# Patient Record
Sex: FEMALE | Race: WHITE | NOT HISPANIC OR LATINO | Employment: PART TIME | URBAN - METROPOLITAN AREA
[De-identification: names, ages, dates, MRNs, and addresses within clinical notes are randomized per-mention and may not be internally consistent; named-entity substitution may affect disease eponyms.]

---

## 2017-09-21 ENCOUNTER — TRANSCRIBE ORDERS (OUTPATIENT)
Dept: ADMINISTRATIVE | Facility: HOSPITAL | Age: 47
End: 2017-09-21

## 2017-09-21 DIAGNOSIS — M54.5 ACUTE LOW BACK PAIN, UNSPECIFIED BACK PAIN LATERALITY, WITH SCIATICA PRESENCE UNSPECIFIED: Primary | ICD-10-CM

## 2017-10-05 ENCOUNTER — HOSPITAL ENCOUNTER (OUTPATIENT)
Dept: INFUSION THERAPY | Facility: HOSPITAL | Age: 47
Discharge: HOME | End: 2017-10-05
Attending: PSYCHIATRY & NEUROLOGY | Admitting: PSYCHIATRY & NEUROLOGY

## 2017-10-05 DIAGNOSIS — M54.5 ACUTE LOW BACK PAIN, UNSPECIFIED BACK PAIN LATERALITY, WITH SCIATICA PRESENCE UNSPECIFIED: ICD-10-CM

## 2017-10-05 PROCEDURE — 95908 NRV CNDJ TST 3-4 STUDIES: CPT | Performed by: PSYCHIATRY & NEUROLOGY

## 2017-10-05 PROCEDURE — 95886 MUSC TEST DONE W/N TEST COMP: CPT | Performed by: PSYCHIATRY & NEUROLOGY

## 2017-10-05 PROCEDURE — 95908 NRV CNDJ TST 3-4 STUDIES: CPT

## 2017-10-05 PROCEDURE — 95886 MUSC TEST DONE W/N TEST COMP: CPT

## 2017-10-05 NOTE — PROCEDURES
EMG and Nerve Conduction Studies    Please see data sheets for details on methods, temperatures and lab standards. EMG muscles tested for upper extremity studies include the deltoid, biceps, triceps, pronator teres, extensor digitorum communis, first dorsal interosseous and abductor pollicis brevis.  EMG muscles tested for lower extremity studies include the vastus lateralis, tibialis anterior, peroneus longus, medial gastrocnemius and extensor digitorum brevis.  Additional muscles tested as needed.  Paraspinal muscles tested as needed.  The complete report includes the data sheets.    Indication: Right lumbosacral radiculopathy  History: 47-year-old woman who earlier this summer was skimming the pool and developed low back pain with radiating pain down the right leg which wraps around from lateral to medial knee.  Symptoms have improved some over time.  She indicates an MRI of her lumbar spine has shown disc bulging with some root impingement.      Ht: Not recorded  Wt: Not recorded  HbA1C: No results found for: HGBA1C  TSH: No results found for: TSH    Technical summary:  Nerve conduction studies were obtained in the right leg.  Skin temperature was a little cool at about 31.5°C measured at the ankle.  Needle examination was obtained on selected muscles of the right leg.    Results:  1.  Normal right sural sensory study.  2.  Normal right superficial peroneal sensory study.  3.  Normal right peroneal motor study.  4.  Normal right tibial motor study.  5.  Needle examination of selected muscles of the right leg showed some increased insertional activity in the medial gastrocnemius however there was also incomplete relaxation.  No clear-cut fibrillations or positive sharp waves were seen however.  The voluntary motor units were normal size and configuration and the interference pattern was essentially full.  Remaining muscles tested showed normal insertional activities, motor units and recruitment  patterns    Impression:  Essentially normal study.  There were normal nerve conductions.  The needle examination showed some irritability of the medial gastrocnemius but definitive evidence of acute denervation was not seen.  Clinical correlation is suggested.  Study results were discussed with the patient.    Ayad Sainz M.D.              Dictated utilizing Dragon dictation.

## 2019-07-09 ENCOUNTER — OFFICE VISIT (OUTPATIENT)
Dept: FAMILY MEDICINE CLINIC | Facility: CLINIC | Age: 49
End: 2019-07-09

## 2019-07-09 VITALS
HEART RATE: 102 BPM | RESPIRATION RATE: 22 BRPM | HEIGHT: 66 IN | BODY MASS INDEX: 44.1 KG/M2 | DIASTOLIC BLOOD PRESSURE: 104 MMHG | TEMPERATURE: 98 F | OXYGEN SATURATION: 94 % | SYSTOLIC BLOOD PRESSURE: 140 MMHG | WEIGHT: 274.4 LBS

## 2019-07-09 DIAGNOSIS — B35.1 ONYCHOMYCOSIS: ICD-10-CM

## 2019-07-09 DIAGNOSIS — S82.191D OTHER CLOSED FRACTURE OF PROXIMAL END OF RIGHT TIBIA WITH ROUTINE HEALING, SUBSEQUENT ENCOUNTER: ICD-10-CM

## 2019-07-09 DIAGNOSIS — K58.2 IRRITABLE BOWEL SYNDROME WITH BOTH CONSTIPATION AND DIARRHEA: ICD-10-CM

## 2019-07-09 DIAGNOSIS — R42 VERTIGO: ICD-10-CM

## 2019-07-09 DIAGNOSIS — M79.7 FIBROMYALGIA: ICD-10-CM

## 2019-07-09 DIAGNOSIS — Z12.39 SCREENING FOR BREAST CANCER: ICD-10-CM

## 2019-07-09 DIAGNOSIS — I10 ESSENTIAL HYPERTENSION: Primary | ICD-10-CM

## 2019-07-09 DIAGNOSIS — E78.01 FAMILIAL HYPERCHOLESTEROLEMIA: ICD-10-CM

## 2019-07-09 DIAGNOSIS — D33.3 VESTIBULAR SCHWANNOMA (HCC): ICD-10-CM

## 2019-07-09 PROBLEM — M32.9 SYSTEMIC LUPUS ERYTHEMATOSUS ARTHRITIS: Status: ACTIVE | Noted: 2019-07-09

## 2019-07-09 PROBLEM — J01.90 SINUSITIS, ACUTE: Status: ACTIVE | Noted: 2019-07-09

## 2019-07-09 PROBLEM — S82.201A RIGHT TIBIAL FRACTURE: Status: ACTIVE | Noted: 2019-07-09

## 2019-07-09 PROBLEM — F41.9 ANXIETY: Status: ACTIVE | Noted: 2019-07-09

## 2019-07-09 PROBLEM — H90.5 SENSORINEURAL HEARING LOSS: Status: ACTIVE | Noted: 2018-02-19

## 2019-07-09 PROBLEM — E78.5 HYPERLIPIDEMIA: Status: ACTIVE | Noted: 2019-07-09

## 2019-07-09 PROBLEM — F32.A DEPRESSION: Status: ACTIVE | Noted: 2019-07-09

## 2019-07-09 PROBLEM — M13.80 SERONEGATIVE ARTHRITIS: Status: ACTIVE | Noted: 2019-07-09

## 2019-07-09 PROBLEM — G47.00 INSOMNIA: Status: ACTIVE | Noted: 2018-08-14

## 2019-07-09 PROBLEM — R53.83 FATIGUE: Status: ACTIVE | Noted: 2019-07-09

## 2019-07-09 PROBLEM — K83.4 SPHINCTER OF ODDI SPASM: Status: ACTIVE | Noted: 2019-07-09

## 2019-07-09 PROBLEM — J45.909 ASTHMA: Status: ACTIVE | Noted: 2019-07-09

## 2019-07-09 PROBLEM — F41.1 GENERALIZED ANXIETY DISORDER: Status: ACTIVE | Noted: 2018-06-26

## 2019-07-09 PROBLEM — K51.90 ULCERATIVE COLITIS: Status: ACTIVE | Noted: 2019-07-09

## 2019-07-09 PROBLEM — M54.50 LOW BACK PAIN: Status: ACTIVE | Noted: 2019-07-09

## 2019-07-09 PROBLEM — R26.89 BALANCE PROBLEMS: Status: ACTIVE | Noted: 2018-02-19

## 2019-07-09 PROBLEM — J30.9 ALLERGIC RHINITIS: Status: ACTIVE | Noted: 2019-07-09

## 2019-07-09 PROBLEM — K58.9 IRRITABLE BOWEL SYNDROME: Status: ACTIVE | Noted: 2019-07-09

## 2019-07-09 PROBLEM — K21.9 GERD (GASTROESOPHAGEAL REFLUX DISEASE): Status: ACTIVE | Noted: 2019-07-09

## 2019-07-09 PROBLEM — G47.33 OBSTRUCTIVE SLEEP APNEA: Status: ACTIVE | Noted: 2019-07-09

## 2019-07-09 PROCEDURE — 99214 OFFICE O/P EST MOD 30 MIN: CPT | Performed by: FAMILY MEDICINE

## 2019-07-09 RX ORDER — HYDROXYZINE PAMOATE 25 MG/1
1 CAPSULE ORAL 3 TIMES DAILY PRN
Refills: 1 | COMMUNITY
Start: 2019-06-03 | End: 2020-01-08 | Stop reason: SDUPTHER

## 2019-07-09 RX ORDER — ATORVASTATIN CALCIUM 20 MG/1
1 TABLET, FILM COATED ORAL NIGHTLY
Refills: 5 | COMMUNITY
Start: 2019-06-25 | End: 2019-07-09 | Stop reason: SDUPTHER

## 2019-07-09 RX ORDER — HYDROCHLOROTHIAZIDE 25 MG/1
25 TABLET ORAL DAILY
Qty: 30 TABLET | Refills: 5 | Status: SHIPPED | OUTPATIENT
Start: 2019-07-09 | End: 2019-08-06 | Stop reason: ALTCHOICE

## 2019-07-09 RX ORDER — ALBUTEROL SULFATE 90 UG/1
2 AEROSOL, METERED RESPIRATORY (INHALATION) EVERY 4 HOURS PRN
COMMUNITY
End: 2020-03-20

## 2019-07-09 RX ORDER — MULTIVIT-MIN/FOLIC/VIT K/LYCOP 400-20-370
1 TABLET ORAL 2 TIMES DAILY PRN
Refills: 1 | COMMUNITY
Start: 2019-04-17 | End: 2019-07-09 | Stop reason: SDUPTHER

## 2019-07-09 RX ORDER — LOSARTAN POTASSIUM AND HYDROCHLOROTHIAZIDE 25; 100 MG/1; MG/1
1 TABLET ORAL DAILY
COMMUNITY
End: 2019-07-09 | Stop reason: ALTCHOICE

## 2019-07-09 RX ORDER — OMEPRAZOLE 40 MG/1
1 CAPSULE, DELAYED RELEASE ORAL DAILY
Refills: 5 | COMMUNITY
Start: 2019-06-25 | End: 2019-07-09 | Stop reason: SDUPTHER

## 2019-07-09 RX ORDER — LORATADINE 10 MG/1
1 TABLET ORAL DAILY
COMMUNITY
End: 2020-12-01

## 2019-07-09 RX ORDER — AMLODIPINE BESYLATE 2.5 MG/1
2.5 TABLET ORAL DAILY
Qty: 30 TABLET | Refills: 11 | Status: SHIPPED | OUTPATIENT
Start: 2019-07-09 | End: 2020-01-08 | Stop reason: SDUPTHER

## 2019-07-09 RX ORDER — GABAPENTIN 800 MG/1
3 TABLET ORAL DAILY
Refills: 5 | COMMUNITY
Start: 2019-06-25 | End: 2019-07-09 | Stop reason: SDUPTHER

## 2019-07-09 RX ORDER — ATORVASTATIN CALCIUM 20 MG/1
20 TABLET, FILM COATED ORAL NIGHTLY
Qty: 30 TABLET | Refills: 5 | Status: SHIPPED | OUTPATIENT
Start: 2019-07-09 | End: 2020-01-08 | Stop reason: SDUPTHER

## 2019-07-09 RX ORDER — OMEPRAZOLE 40 MG/1
40 CAPSULE, DELAYED RELEASE ORAL DAILY
Qty: 30 CAPSULE | Refills: 5 | Status: SHIPPED | OUTPATIENT
Start: 2019-07-09 | End: 2019-10-14 | Stop reason: ALTCHOICE

## 2019-07-09 RX ORDER — AMLODIPINE BESYLATE 2.5 MG/1
1 TABLET ORAL DAILY
Refills: 11 | COMMUNITY
Start: 2019-06-04 | End: 2019-07-09 | Stop reason: SDUPTHER

## 2019-07-09 RX ORDER — MULTIVIT-MIN/FOLIC/VIT K/LYCOP 400-20-370
250 TABLET ORAL 2 TIMES DAILY
Qty: 60 CAPSULE | Refills: 12 | Status: SHIPPED | OUTPATIENT
Start: 2019-07-09 | End: 2019-11-08

## 2019-07-09 RX ORDER — CYCLOBENZAPRINE HCL 10 MG
10 TABLET ORAL 3 TIMES DAILY PRN
COMMUNITY
End: 2021-04-13 | Stop reason: HOSPADM

## 2019-07-09 RX ORDER — GABAPENTIN 800 MG/1
800 TABLET ORAL 3 TIMES DAILY
Qty: 90 TABLET | Refills: 5 | Status: SHIPPED | OUTPATIENT
Start: 2019-07-09 | End: 2020-01-08 | Stop reason: SDUPTHER

## 2019-07-09 RX ORDER — MIRABEGRON 50 MG/1
1 TABLET, FILM COATED, EXTENDED RELEASE ORAL DAILY
Refills: 11 | COMMUNITY
Start: 2019-06-13 | End: 2021-11-30

## 2019-07-09 RX ORDER — DULOXETIN HYDROCHLORIDE 60 MG/1
1 CAPSULE, DELAYED RELEASE ORAL DAILY
Refills: 2 | COMMUNITY
Start: 2019-06-21 | End: 2019-11-08

## 2019-07-09 RX ORDER — FLUCONAZOLE 200 MG/1
200 TABLET ORAL WEEKLY
Qty: 12 TABLET | Refills: 1 | Status: SHIPPED | OUTPATIENT
Start: 2019-07-09 | End: 2020-12-01

## 2019-07-09 RX ORDER — FLUCONAZOLE 200 MG/1
1 TABLET ORAL WEEKLY
Refills: 1 | COMMUNITY
Start: 2019-04-05 | End: 2019-07-09 | Stop reason: SDUPTHER

## 2019-07-09 RX ORDER — TEMAZEPAM 7.5 MG/1
15 CAPSULE ORAL NIGHTLY PRN
Refills: 0 | COMMUNITY
Start: 2019-05-02 | End: 2019-07-22

## 2019-07-09 RX ORDER — CYCLOSPORINE 0.5 MG/ML
1 EMULSION OPHTHALMIC 2 TIMES DAILY
COMMUNITY
End: 2021-12-21 | Stop reason: SDUPTHER

## 2019-07-09 RX ORDER — FLUTICASONE PROPIONATE 50 MCG
2 SPRAY, SUSPENSION (ML) NASAL DAILY
COMMUNITY
End: 2021-04-13 | Stop reason: HOSPADM

## 2019-07-09 RX ORDER — MECLIZINE HCL 12.5 MG/1
1 TABLET ORAL 3 TIMES DAILY PRN
Refills: 3 | COMMUNITY
Start: 2019-06-13 | End: 2022-05-16 | Stop reason: ALTCHOICE

## 2019-07-09 NOTE — PROGRESS NOTES
Subjective   Amy L Sturgeon is a 49 y.o. female.   Chief Complaint   Patient presents with   • Hypertension         History of Present Illness     Patient present today to re-establish care. Previous patient at [facility].  Has concerns of her blood pressure being elevated. Losartan w/ HCTZ was stopped by [facility] after patient's BP remained WNL and patient experienced fatigue and dizziness.  Patient has history of acoustic neuroma which causes chronic vertigo.  She experiences this mostly when she is lying down or rolling over when sleeping. She is requesting multiple refills today. Last screening mammogram was on 4/4/18 and was BIRADS 1 at [facility] she would like to get her next mammogram scheduled at the same location. She states that she has means of checking her BP at home, but does not due to the age of the machine. She states that her BP at [facility] has been running 140's/90's.She has been taking Diflucan for the past 6 months for toenail fungus on bilateral great toes,  she states the right nail is still very thick left nail is looking much better.  sThe following portions of the patient's history were reviewed and updated as appropriate: allergies, current medications, past family history, past medical history, past social history, past surgical history and problem list.    Review of Systems   Constitutional: Negative for diaphoresis, fatigue and fever.   Respiratory: Negative for cough and shortness of breath.    Cardiovascular: Negative for chest pain, palpitations and leg swelling.   Gastrointestinal: Negative.  Negative for GERD and indigestion ( contolled on omeprozole).   Musculoskeletal: Positive for myalgias ( much better on Gabapentin and cymbalta).   Skin: Negative for rash.   Neurological: Positive for dizziness.   Psychiatric/Behavioral: Positive for depressed mood. The patient is nervous/anxious ( better on medications).        Objective   Physical Exam    Constitutional: She is oriented to person, place, and time. She appears well-developed and well-nourished. No distress.   Eyes: Conjunctivae and EOM are normal. Pupils are equal, round, and reactive to light.   Neck: Normal range of motion.   Cardiovascular: Normal rate and regular rhythm.   No murmur heard.  Pulmonary/Chest: Effort normal. She has no wheezes.   Musculoskeletal: Normal range of motion. She exhibits no edema.   Neurological: She is alert and oriented to person, place, and time.   Skin: Skin is warm and dry.   Right great toe nail was going about shelter is still very thick left nail appears mostly clear   Psychiatric: She has a normal mood and affect.   Nursing note and vitals reviewed.        Assessment/Plan   Marcela was seen today for hypertension.    Diagnoses and all orders for this visit:    Essential hypertension  -     Cancel: Comprehensive Metabolic Panel; Future  -     Cancel: TSH; Future  -     Comprehensive Metabolic Panel; Future  -     TSH; Future    Familial hypercholesterolemia  -     Cancel: Lipid Panel; Future  -     Lipid Panel; Future    Onychomycosis    Other closed fracture of proximal end of right tibia with routine healing, subsequent encounter    Fibromyalgia    Irritable bowel syndrome with both constipation and diarrhea    Screening for breast cancer  -     Mammo Screening Bilateral With CAD; Future    Vestibular schwannoma (CMS/HCC)    Vertigo    Other orders  -     amLODIPine (NORVASC) 2.5 MG tablet; Take 1 tablet by mouth Daily.  -     atorvastatin (LIPITOR) 20 MG tablet; Take 1 tablet by mouth Every Night.  -     [facility] DIGESTIVE PROBIOTIC 250 MG capsule; Take 1 capsule by mouth 2 (Two) Times a Day.  -     fluconazole (DIFLUCAN) 200 MG tablet; Take 1 tablet by mouth 1 (One) Time Per Week. X 3 months  -     gabapentin (NEURONTIN) 800 MG tablet; Take 1 tablet by mouth 3 (Three) Times a Day. 1 tab po qd and 2 tabs po q hs  -     omeprazole (priLOSEC) 40 MG capsule; Take 1  capsule by mouth Daily.  -     piroxicam (FELDENE) 20 MG capsule; Take 1 capsule by mouth Daily. with food  -     hydrochlorothiazide (HYDRODIURIL) 25 MG tablet; Take 1 tablet by mouth Daily.        Return in about 2 weeks (around 7/23/2019) for Annual physical /review labs, re check BP.

## 2019-07-09 NOTE — PATIENT INSTRUCTIONS
"DASH Eating Plan  DASH stands for \"Dietary Approaches to Stop Hypertension.\" The DASH eating plan is a healthy eating plan that has been shown to reduce high blood pressure (hypertension). It may also reduce your risk for type 2 diabetes, heart disease, and stroke. The DASH eating plan may also help with weight loss.  What are tips for following this plan?  General guidelines  · Avoid eating more than 2,300 mg (milligrams) of salt (sodium) a day. If you have hypertension, you may need to reduce your sodium intake to 1,500 mg a day.  · Limit alcohol intake to no more than 1 drink a day for nonpregnant women and 2 drinks a day for men. One drink equals 12 oz of beer, 5 oz of wine, or 1½ oz of hard liquor.  · Work with your health care provider to maintain a healthy body weight or to lose weight. Ask what an ideal weight is for you.  · Get at least 30 minutes of exercise that causes your heart to beat faster (aerobic exercise) most days of the week. Activities may include walking, swimming, or biking.  · Work with your health care provider or diet and nutrition specialist (dietitian) to adjust your eating plan to your individual calorie needs.  Reading food labels  · Check food labels for the amount of sodium per serving. Choose foods with less than 5 percent of the Daily Value of sodium. Generally, foods with less than 300 mg of sodium per serving fit into this eating plan.  · To find whole grains, look for the word \"whole\" as the first word in the ingredient list.  Shopping  · Buy products labeled as \"low-sodium\" or \"no salt added.\"  · Buy fresh foods. Avoid canned foods and premade or frozen meals.  Cooking  · Avoid adding salt when cooking. Use salt-free seasonings or herbs instead of table salt or sea salt. Check with your health care provider or pharmacist before using salt substitutes.  · Do not kilgore foods. Cook foods using healthy methods such as baking, boiling, grilling, and broiling instead.  · Cook with " heart-healthy oils, such as olive, canola, soybean, or sunflower oil.  Meal planning    · Eat a balanced diet that includes:  ? 5 or more servings of fruits and vegetables each day. At each meal, try to fill half of your plate with fruits and vegetables.  ? Up to 6-8 servings of whole grains each day.  ? Less than 6 oz of lean meat, poultry, or fish each day. A 3-oz serving of meat is about the same size as a deck of cards. One egg equals 1 oz.  ? 2 servings of low-fat dairy each day.  ? A serving of nuts, seeds, or beans 5 times each week.  ? Heart-healthy fats. Healthy fats called Omega-3 fatty acids are found in foods such as flaxseeds and coldwater fish, like sardines, salmon, and mackerel.  · Limit how much you eat of the following:  ? Canned or prepackaged foods.  ? Food that is high in trans fat, such as fried foods.  ? Food that is high in saturated fat, such as fatty meat.  ? Sweets, desserts, sugary drinks, and other foods with added sugar.  ? Full-fat dairy products.  · Do not salt foods before eating.  · Try to eat at least 2 vegetarian meals each week.  · Eat more home-cooked food and less restaurant, buffet, and fast food.  · When eating at a restaurant, ask that your food be prepared with less salt or no salt, if possible.  What foods are recommended?  The items listed may not be a complete list. Talk with your dietitian about what dietary choices are best for you.  Grains  Whole-grain or whole-wheat bread. Whole-grain or whole-wheat pasta. Brown rice. Oatmeal. Quinoa. Bulgur. Whole-grain and low-sodium cereals. Elise bread. Low-fat, low-sodium crackers. Whole-wheat flour tortillas.  Vegetables  Fresh or frozen vegetables (raw, steamed, roasted, or grilled). Low-sodium or reduced-sodium tomato and vegetable juice. Low-sodium or reduced-sodium tomato sauce and tomato paste. Low-sodium or reduced-sodium canned vegetables.  Fruits  All fresh, dried, or frozen fruit. Canned fruit in natural juice (without  added sugar).  Meat and other protein foods  Skinless chicken or turkey. Ground chicken or turkey. Pork with fat trimmed off. Fish and seafood. Egg whites. Dried beans, peas, or lentils. Unsalted nuts, nut butters, and seeds. Unsalted canned beans. Lean cuts of beef with fat trimmed off. Low-sodium, lean deli meat.  Dairy  Low-fat (1%) or fat-free (skim) milk. Fat-free, low-fat, or reduced-fat cheeses. Nonfat, low-sodium ricotta or cottage cheese. Low-fat or nonfat yogurt. Low-fat, low-sodium cheese.  Fats and oils  Soft margarine without trans fats. Vegetable oil. Low-fat, reduced-fat, or light mayonnaise and salad dressings (reduced-sodium). Canola, safflower, olive, soybean, and sunflower oils. Avocado.  Seasoning and other foods  Herbs. Spices. Seasoning mixes without salt. Unsalted popcorn and pretzels. Fat-free sweets.  What foods are not recommended?  The items listed may not be a complete list. Talk with your dietitian about what dietary choices are best for you.  Grains  Baked goods made with fat, such as croissants, muffins, or some breads. Dry pasta or rice meal packs.  Vegetables  Creamed or fried vegetables. Vegetables in a cheese sauce. Regular canned vegetables (not low-sodium or reduced-sodium). Regular canned tomato sauce and paste (not low-sodium or reduced-sodium). Regular tomato and vegetable juice (not low-sodium or reduced-sodium). Pickles. Olives.  Fruits  Canned fruit in a light or heavy syrup. Fried fruit. Fruit in cream or butter sauce.  Meat and other protein foods  Fatty cuts of meat. Ribs. Fried meat. Upton. Sausage. Bologna and other processed lunch meats. Salami. Fatback. Hotdogs. Bratwurst. Salted nuts and seeds. Canned beans with added salt. Canned or smoked fish. Whole eggs or egg yolks. Chicken or turkey with skin.  Dairy  Whole or 2% milk, cream, and half-and-half. Whole or full-fat cream cheese. Whole-fat or sweetened yogurt. Full-fat cheese. Nondairy creamers. Whipped toppings.  Processed cheese and cheese spreads.  Fats and oils  Butter. Stick margarine. Lard. Shortening. Ghee. Upton fat. Tropical oils, such as coconut, palm kernel, or palm oil.  Seasoning and other foods  Salted popcorn and pretzels. Onion salt, garlic salt, seasoned salt, table salt, and sea salt. Worcestershire sauce. Tartar sauce. Barbecue sauce. Teriyaki sauce. Soy sauce, including reduced-sodium. Steak sauce. Canned and packaged gravies. Fish sauce. Oyster sauce. Cocktail sauce. Horseradish that you find on the shelf. Ketchup. Mustard. Meat flavorings and tenderizers. Bouillon cubes. Hot sauce and Tabasco sauce. Premade or packaged marinades. Premade or packaged taco seasonings. Relishes. Regular salad dressings.  Where to find more information:  · National Heart, Lung, and Blood [place]: www.nhlbi.nih.gov  · American Heart Association: www.heart.org  Summary  · The DASH eating plan is a healthy eating plan that has been shown to reduce high blood pressure (hypertension). It may also reduce your risk for type 2 diabetes, heart disease, and stroke.  · With the DASH eating plan, you should limit salt (sodium) intake to 2,300 mg a day. If you have hypertension, you may need to reduce your sodium intake to 1,500 mg a day.  · When on the DASH eating plan, aim to eat more fresh fruits and vegetables, whole grains, lean proteins, low-fat dairy, and heart-healthy fats.  · Work with your health care provider or diet and nutrition specialist (dietitian) to adjust your eating plan to your individual calorie needs.  This information is not intended to replace advice given to you by your health care provider. Make sure you discuss any questions you have with your health care provider.  Document Released: 12/06/2012 Document Revised: 12/11/2017 Document Reviewed: 12/11/2017  [employer] Interactive Patient Education © 2019 [employer]    Benign Positional Vertigo  Vertigo is the feeling that you or your surroundings are moving  when they are not. Benign positional vertigo is the most common form of vertigo. The cause of this condition is not serious (is benign). This condition is triggered by certain movements and positions (is positional). This condition can be dangerous if it occurs while you are doing something that could endanger you or others, such as driving.  What are the causes?  In many cases, the cause of this condition is not known. It may be caused by a disturbance in an area of the inner ear that helps your brain to sense movement and balance. This disturbance can be caused by a viral infection (labyrinthitis), head injury, or repetitive motion.  What increases the risk?  This condition is more likely to develop in:  · Women.  · People who are 50 years of age or older.    What are the signs or symptoms?  Symptoms of this condition usually happen when you move your head or your eyes in different directions. Symptoms may start suddenly, and they usually last for less than a minute. Symptoms may include:  · Loss of balance and falling.  · Feeling like you are spinning or moving.  · Feeling like your surroundings are spinning or moving.  · Nausea and vomiting.  · Blurred vision.  · Dizziness.  · Involuntary eye movement (nystagmus).    Symptoms can be mild and cause only slight annoyance, or they can be severe and interfere with daily life. Episodes of benign positional vertigo may return (recur) over time, and they may be triggered by certain movements. Symptoms may improve over time.  How is this diagnosed?  This condition is usually diagnosed by medical history and a physical exam of the head, neck, and ears. You may be referred to a health care provider who specializes in ear, nose, and throat (ENT) problems (otolaryngologist) or a provider who specializes in disorders of the nervous system (neurologist). You may have additional testing, including:  · MRI.  · A CT scan.  · Eye movement tests. Your health care provider may ask  you to change positions quickly while he or she watches you for symptoms of benign positional vertigo, such as nystagmus. Eye movement may be tested with an electronystagmogram (ENG), caloric stimulation, the Karol-Hallpike test, or the roll test.  · An electroencephalogram (EEG). This records electrical activity in your brain.  · Hearing tests.    How is this treated?  Usually, your health care provider will treat this by moving your head in specific positions to adjust your inner ear back to normal. Surgery may be needed in severe cases, but this is rare. In some cases, benign positional vertigo may resolve on its own in 2-4 weeks.  Follow these instructions at home:  Safety  · Move slowly.Avoid sudden body or head movements.  · Avoid driving.  · Avoid operating heavy machinery.  · Avoid doing any tasks that would be dangerous to you or others if a vertigo episode would occur.  · If you have trouble walking or keeping your balance, try using a cane for stability. If you feel dizzy or unstable, sit down right away.  · Return to your normal activities as told by your health care provider. Ask your health care provider what activities are safe for you.  General instructions  · Take over-the-counter and prescription medicines only as told by your health care provider.  · Avoid certain positions or movements as told by your health care provider.  · Drink enough fluid to keep your urine clear or pale yellow.  · Keep all follow-up visits as told by your health care provider. This is important.  Contact a health care provider if:  · You have a fever.  · Your condition gets worse or you develop new symptoms.  · Your family or friends notice any behavioral changes.  · Your nausea or vomiting gets worse.  · You have numbness or a “pins and needles” sensation.  Get help right away if:  · You have difficulty speaking or moving.  · You are always dizzy.  · You faint.  · You develop severe headaches.  · You have weakness in your  legs or arms.  · You have changes in your hearing or vision.  · You develop a stiff neck.  · You develop sensitivity to light.  This information is not intended to replace advice given to you by your health care provider. Make sure you discuss any questions you have with your health care provider.  Document Released: 09/25/2007 Document Revised: 05/25/2017 Document Reviewed: 04/11/2016  [employer] Interactive Patient Education © 2019 [employer]

## 2019-07-14 ENCOUNTER — RESULTS ENCOUNTER (OUTPATIENT)
Dept: FAMILY MEDICINE CLINIC | Facility: CLINIC | Age: 49
End: 2019-07-14

## 2019-07-14 DIAGNOSIS — I10 ESSENTIAL HYPERTENSION: ICD-10-CM

## 2019-07-14 DIAGNOSIS — E78.01 FAMILIAL HYPERCHOLESTEROLEMIA: ICD-10-CM

## 2019-07-16 ENCOUNTER — TELEPHONE (OUTPATIENT)
Dept: FAMILY MEDICINE CLINIC | Facility: CLINIC | Age: 49
End: 2019-07-16

## 2019-07-16 NOTE — TELEPHONE ENCOUNTER
----- Message from Susanne Burton sent at 7/16/2019  9:34 AM EDT -----  Contact: patient  TC from patient. Patient requests that an order for labs and a screening mammogram be sent to [facility].

## 2019-07-16 NOTE — TELEPHONE ENCOUNTER
These were all ordered on 7/11/2019 please make sure that the order has been sent to [facility]

## 2019-07-22 RX ORDER — TEMAZEPAM 15 MG/1
1 CAPSULE ORAL NIGHTLY PRN
Refills: 0 | COMMUNITY
Start: 2019-07-10 | End: 2019-11-08

## 2019-07-23 ENCOUNTER — OFFICE VISIT (OUTPATIENT)
Dept: FAMILY MEDICINE CLINIC | Facility: CLINIC | Age: 49
End: 2019-07-23

## 2019-07-23 VITALS
HEIGHT: 66 IN | WEIGHT: 271.1 LBS | TEMPERATURE: 97.6 F | DIASTOLIC BLOOD PRESSURE: 94 MMHG | OXYGEN SATURATION: 98 % | SYSTOLIC BLOOD PRESSURE: 128 MMHG | HEART RATE: 90 BPM | BODY MASS INDEX: 43.57 KG/M2 | RESPIRATION RATE: 20 BRPM

## 2019-07-23 DIAGNOSIS — I10 ESSENTIAL HYPERTENSION: ICD-10-CM

## 2019-07-23 DIAGNOSIS — R73.9 HYPERGLYCEMIA: ICD-10-CM

## 2019-07-23 DIAGNOSIS — E66.01 CLASS 3 SEVERE OBESITY DUE TO EXCESS CALORIES WITHOUT SERIOUS COMORBIDITY WITH BODY MASS INDEX (BMI) OF 40.0 TO 44.9 IN ADULT (HCC): ICD-10-CM

## 2019-07-23 DIAGNOSIS — E78.01 FAMILIAL HYPERCHOLESTEROLEMIA: ICD-10-CM

## 2019-07-23 DIAGNOSIS — Z00.00 ANNUAL PHYSICAL EXAM: Primary | ICD-10-CM

## 2019-07-23 PROBLEM — E66.813 CLASS 3 SEVERE OBESITY IN ADULT: Status: ACTIVE | Noted: 2019-07-23

## 2019-07-23 PROCEDURE — 99213 OFFICE O/P EST LOW 20 MIN: CPT | Performed by: FAMILY MEDICINE

## 2019-07-23 PROCEDURE — 99396 PREV VISIT EST AGE 40-64: CPT | Performed by: FAMILY MEDICINE

## 2019-07-23 NOTE — PATIENT INSTRUCTIONS
Preventive Care 40-64 Years, Female  Preventive care refers to lifestyle choices and visits with your health care provider that can promote health and wellness.  What does preventive care include?  · A yearly physical exam. This is also called an annual well check.  · Dental exams once or twice a year.  · Routine eye exams. Ask your health care provider how often you should have your eyes checked.  · Personal lifestyle choices, including:  ? Daily care of your teeth and gums.  ? Regular physical activity.  ? Eating a healthy diet.  ? Avoiding tobacco and drug use.  ? Limiting alcohol use.  ? Practicing safe sex.  ? Taking low-dose aspirin daily starting at age 50.  ? Taking vitamin and mineral supplements as recommended by your health care provider.  What happens during an annual well check?  The services and screenings done by your health care provider during your annual well check will depend on your age, overall health, lifestyle risk factors, and family history of disease.  Counseling  Your health care provider may ask you questions about your:  · Alcohol use.  · Tobacco use.  · Drug use.  · Emotional well-being.  · Home and relationship well-being.  · Sexual activity.  · Eating habits.  · Work and work environment.  · Method of birth control.  · Menstrual cycle.  · Pregnancy history.    Screening  You may have the following tests or measurements:  · Height, weight, and BMI.  · Blood pressure.  · Lipid and cholesterol levels. These may be checked every 5 years, or more frequently if you are over 50 years old.  · Skin check.  · Lung cancer screening. You may have this screening every year starting at age 55 if you have a 30-pack-year history of smoking and currently smoke or have quit within the past 15 years.  · Fecal occult blood test (FOBT) of the stool. You may have this test every year starting at age 50.  · Flexible sigmoidoscopy or colonoscopy. You may have a sigmoidoscopy every 5 years or a colonoscopy  every 10 years starting at age 50.  · Hepatitis C blood test.  · Hepatitis B blood test.  · Sexually transmitted disease (STD) testing.  · Diabetes screening. This is done by checking your blood sugar (glucose) after you have not eaten for a while (fasting). You may have this done every 1-3 years.  · Mammogram. This may be done every 1-2 years. Talk to your health care provider about when you should start having regular mammograms. This may depend on whether you have a family history of breast cancer.  · BRCA-related cancer screening. This may be done if you have a family history of breast, ovarian, tubal, or peritoneal cancers.  · Pelvic exam and Pap test. This may be done every 3 years starting at age 21. Starting at age 30, this may be done every 5 years if you have a Pap test in combination with an HPV test.  · Bone density scan. This is done to screen for osteoporosis. You may have this scan if you are at high risk for osteoporosis.    Discuss your test results, treatment options, and if necessary, the need for more tests with your health care provider.  Vaccines  Your health care provider may recommend certain vaccines, such as:  · Influenza vaccine. This is recommended every year.  · Tetanus, diphtheria, and acellular pertussis (Tdap, Td) vaccine. You may need a Td booster every 10 years.  · Varicella vaccine. You may need this if you have not been vaccinated.  · Zoster vaccine. You may need this after age 60.  · Measles, mumps, and rubella (MMR) vaccine. You may need at least one dose of MMR if you were born in 1957 or later. You may also need a second dose.  · Pneumococcal 13-valent conjugate (PCV13) vaccine. You may need this if you have certain conditions and were not previously vaccinated.  · Pneumococcal polysaccharide (PPSV23) vaccine. You may need one or two doses if you smoke cigarettes or if you have certain conditions.  · Meningococcal vaccine. You may need this if you have certain  conditions.  · Hepatitis A vaccine. You may need this if you have certain conditions or if you travel or work in places where you may be exposed to hepatitis A.  · Hepatitis B vaccine. You may need this if you have certain conditions or if you travel or work in places where you may be exposed to hepatitis B.  · Haemophilus influenzae type b (Hib) vaccine. You may need this if you have certain conditions.    Talk to your health care provider about which screenings and vaccines you need and how often you need them.  This information is not intended to replace advice given to you by your health care provider. Make sure you discuss any questions you have with your health care provider.  Document Released: 01/13/2017 Document Revised: 09/12/2018 Document Reviewed: 10/18/2016  [employer] Interactive Patient Education © 2019 [employer]    Preventing Type 2 Diabetes Mellitus  Type 2 diabetes (type 2 diabetes mellitus) is a long-term (chronic) disease that affects blood sugar (glucose) levels. Normally, a hormone called insulin allows glucose to enter cells in the body. The cells use glucose for energy. In type 2 diabetes, one or both of these problems may be present:  · The body does not make enough insulin.  · The body does not respond properly to insulin that it makes (insulin resistance).    Insulin resistance or lack of insulin causes excess glucose to build up in the blood instead of going into cells. As a result, high blood glucose (hyperglycemia) develops, which can cause many complications. Being overweight or obese and having an inactive (sedentary) lifestyle can increase your risk for diabetes. Type 2 diabetes can be delayed or prevented by making certain nutrition and lifestyle changes.  What nutrition changes can be made?  · Eat healthy meals and snacks regularly. Keep a healthy snack with you for when you get hungry between meals, such as fruit or a handful of nuts.  · Eat lean meats and proteins that are low  in saturated fats, such as chicken, fish, egg whites, and beans. Avoid processed meats.  · Eat plenty of fruits and vegetables and plenty of grains that have not been processed (whole grains). It is recommended that you eat:  ? 1½?2 cups of fruit every day.  ? 2½?3 cups of vegetables every day.  ? 6?8 oz of whole grains every day, such as oats, whole wheat, bulgur, brown rice, quinoa, and millet.  · Eat low-fat dairy products, such as milk, yogurt, and cheese.  · Eat foods that contain healthy fats, such as nuts, avocado, olive oil, and canola oil.  · Drink water throughout the day. Avoid drinks that contain added sugar, such as soda or sweet tea.  · Follow instructions from your health care provider about specific eating or drinking restrictions.  · Control how much food you eat at a time (portion size).  ? Check food labels to find out the serving sizes of foods.  ? Use a kitchen scale to weigh amounts of foods.  · Saute or steam food instead of frying it. Cook with water or broth instead of oils or butter.  · Limit your intake of:  ? Salt (sodium). Have no more than 1 tsp (2,400 mg) of sodium a day. If you have heart disease or high blood pressure, have less than ½?¾ tsp (1,500 mg) of sodium a day.  ? Saturated fat. This is fat that is solid at room temperature, such as butter or fat on meat.  What lifestyle changes can be made?  Activity  · Do moderate-intensity physical activity for at least 30 minutes on at least 5 days of the week, or as much as told by your health care provider.  · Ask your health care provider what activities are safe for you. A mix of physical activities may be best, such as walking, swimming, cycling, and strength training.  · Try to add physical activity into your day. For example:  ? Park in spots that are farther away than usual, so that you walk more. For example, park in a far corner of the parking lot when you go to the office or the grocery store.  ? Take a walk during your lunch  break.  ? Use stairs instead of elevators or escalators.  Weight Loss  · Lose weight as directed. Your health care provider can determine how much weight loss is best for you and can help you lose weight safely.  · If you are overweight or obese, you may be instructed to lose at least 5?7 % of your body weight.  Alcohol and Tobacco  · Limit alcohol intake to no more than 1 drink a day for nonpregnant women and 2 drinks a day for men. One drink equals 12 oz of beer, 5 oz of wine, or 1½ oz of hard liquor.  · Do not use any tobacco products, such as cigarettes, chewing tobacco, and e-cigarettes. If you need help quitting, ask your health care provider.  Work With Your Health Care Provider  · Have your blood glucose tested regularly, as told by your health care provider.  · Discuss your risk factors and how you can reduce your risk for diabetes.  · Get screening tests as told by your health care provider. You may have screening tests regularly, especially if you have certain risk factors for type 2 diabetes.  · Make an appointment with a diet and nutrition specialist (registered dietitian). A registered dietitian can help you make a healthy eating plan and can help you understand portion sizes and food labels.  Why are these changes important?  · It is possible to prevent or delay type 2 diabetes and related health problems by making lifestyle and nutrition changes.  · It can be difficult to recognize signs of type 2 diabetes. The best way to avoid possible damage to your body is to take actions to prevent the disease before you develop symptoms.  What can happen if changes are not made?  · Your blood glucose levels may keep increasing. Having high blood glucose for a long time is dangerous. Too much glucose in your blood can damage your blood vessels, heart, kidneys, nerves, and eyes.  · You may develop prediabetes or type 2 diabetes. Type 2 diabetes can lead to many chronic health problems and complications, such  as:  ? Heart disease.  ? Stroke.  ? Blindness.  ? Kidney disease.  ? Depression.  ? Poor circulation in the feet and legs, which could lead to surgical removal (amputation) in severe cases.  Where to find support  · Ask your health care provider to recommend a registered dietitian, diabetes educator, or weight loss program.  · Look for local or online weight loss groups.  · Join a gym, fitness club, or outdoor activity group, such as a walking club.  Where to find more information  To learn more about diabetes and diabetes prevention, visit:  · American Diabetes Association (ADA): www.diabetes.org  · National [place] of Diabetes and Digestive and Kidney Diseases: www.niddk.nih.gov/health-information/diabetes    To learn more about healthy eating, visit:  · The [place] Department of Agriculture ([employer]), Choose My Plate: www.School [employer].gov/food-groups  · Office of Disease Prevention and Health Promotion (ODPHP), Dietary Guidelines: www.health.gov/dietaryguidelines    Summary  · You can reduce your risk for type 2 diabetes by increasing your physical activity, eating healthy foods, and losing weight as directed.  · Talk with your health care provider about your risk for type 2 diabetes. Ask about any blood tests or screening tests that you need to have.  This information is not intended to replace advice given to you by your health care provider. Make sure you discuss any questions you have with your health care provider.  Document Released: 04/10/2017 Document Revised: 11/29/2018 Document Reviewed: 02/07/2017  [employer] Interactive Patient Education © 2019 [employer]    Calorie Counting for Weight Loss  Calories are units of energy. Your body needs a certain amount of calories from food to keep you going throughout the day. When you eat more calories than your body needs, your body stores the extra calories as fat. When you eat fewer calories than your body needs, your body burns fat to get the energy it  needs.  Calorie counting means keeping track of how many calories you eat and drink each day. Calorie counting can be helpful if you need to lose weight. If you make sure to eat fewer calories than your body needs, you should lose weight. Ask your health care provider what a healthy weight is for you.  For calorie counting to work, you will need to eat the right number of calories in a day in order to lose a healthy amount of weight per week. A dietitian can help you determine how many calories you need in a day and will give you suggestions on how to reach your calorie goal.  · A healthy amount of weight to lose per week is usually 1-2 lb (0.5-0.9 kg). This usually means that your daily calorie intake should be reduced by 500-750 calories.  · Eating 1,200 - 1,500 calories per day can help most women lose weight.  · Eating 1,500 - 1,800 calories per day can help most men lose weight.    What is my plan?  My goal is to have _____1500 to 1800_____ calories per day.  If I have this many calories per day, I should lose around _____1-2_____ pounds per week.  What do I need to know about calorie counting?  In order to meet your daily calorie goal, you will need to:  · Find out how many calories are in each food you would like to eat. Try to do this before you eat.  · Decide how much of the food you plan to eat.  · Write down what you ate and how many calories it had. Doing this is called keeping a food log.    To successfully lose weight, it is important to balance calorie counting with a healthy lifestyle that includes regular activity. Aim for 150 minutes of moderate exercise (such as walking) or 75 minutes of vigorous exercise (such as running) each week.  Where do I find calorie information?    The number of calories in a food can be found on a Nutrition Facts label. If a food does not have a Nutrition Facts label, try to look up the calories online or ask your dietitian for help.  Remember that calories are listed  per serving. If you choose to have more than one serving of a food, you will have to multiply the calories per serving by the amount of servings you plan to eat. For example, the label on a package of bread might say that a serving size is 1 slice and that there are 90 calories in a serving. If you eat 1 slice, you will have eaten 90 calories. If you eat 2 slices, you will have eaten 180 calories.  How do I keep a food log?  Immediately after each meal, record the following information in your food log:  · What you ate. Don't forget to include toppings, sauces, and other extras on the food.  · How much you ate. This can be measured in cups, ounces, or number of items.  · How many calories each food and drink had.  · The total number of calories in the meal.    Keep your food log near you, such as in a small notebook in your pocket, or use a mobile jo ann or website. Some programs will calculate calories for you and show you how many calories you have left for the day to meet your goal.  What are some calorie counting tips?  · Use your calories on foods and drinks that will fill you up and not leave you hungry:  ? Some examples of foods that fill you up are nuts and nut butters, vegetables, lean proteins, and high-fiber foods like whole grains. High-fiber foods are foods with more than 5 g fiber per serving.  ? Drinks such as sodas, specialty coffee drinks, alcohol, and juices have a lot of calories, yet do not fill you up.  · Eat nutritious foods and avoid empty calories. Empty calories are calories you get from foods or beverages that do not have many vitamins or protein, such as candy, sweets, and soda. It is better to have a nutritious high-calorie food (such as an avocado) than a food with few nutrients (such as a bag of chips).  · Know how many calories are in the foods you eat most often. This will help you calculate calorie counts faster.  · Pay attention to calories in drinks. Low-calorie drinks include water  "and unsweetened drinks.  · Pay attention to nutrition labels for \"low fat\" or \"fat free\" foods. These foods sometimes have the same amount of calories or more calories than the full fat versions. They also often have added sugar, starch, or salt, to make up for flavor that was removed with the fat.  · Find a way of tracking calories that works for you. Get creative. Try different apps or programs if writing down calories does not work for you.  What are some portion control tips?  · Know how many calories are in a serving. This will help you know how many servings of a certain food you can have.  · Use a measuring cup to measure serving sizes. You could also try weighing out portions on a kitchen scale. With time, you will be able to estimate serving sizes for some foods.  · Take some time to put servings of different foods on your favorite plates, bowls, and cups so you know what a serving looks like.  · Try not to eat straight from a bag or box. Doing this can lead to overeating. Put the amount you would like to eat in a cup or on a plate to make sure you are eating the right portion.  · Use smaller plates, glasses, and bowls to prevent overeating.  · Try not to multitask (for example, watch TV or use your computer) while eating. If it is time to eat, sit down at a table and enjoy your food. This will help you to know when you are full. It will also help you to be aware of what you are eating and how much you are eating.  What are tips for following this plan?  Reading food labels  · Check the calorie count compared to the serving size. The serving size may be smaller than what you are used to eating.  · Check the source of the calories. Make sure the food you are eating is high in vitamins and protein and low in saturated and trans fats.  Shopping  · Read nutrition labels while you shop. This will help you make healthy decisions before you decide to purchase your food.  · Make a grocery list and stick to " it.  Cooking  · Try to cook your favorite foods in a healthier way. For example, try baking instead of frying.  · Use low-fat dairy products.  Meal planning  · Use more fruits and vegetables. Half of your plate should be fruits and vegetables.  · Include lean proteins like poultry and fish.  How do I count calories when eating out?  · Ask for smaller portion sizes.  · Consider sharing an entree and sides instead of getting your own entree.  · If you get your own entree, eat only half. Ask for a box at the beginning of your meal and put the rest of your entree in it so you are not tempted to eat it.  · If calories are listed on the menu, choose the lower calorie options.  · Choose dishes that include vegetables, fruits, whole grains, low-fat dairy products, and lean protein.  · Choose items that are boiled, broiled, grilled, or steamed. Stay away from items that are buttered, battered, fried, or served with cream sauce. Items labeled “crispy” are usually fried, unless stated otherwise.  · Choose water, low-fat milk, unsweetened iced tea, or other drinks without added sugar. If you want an alcoholic beverage, choose a lower calorie option such as a glass of wine or light beer.  · Ask for dressings, sauces, and syrups on the side. These are usually high in calories, so you should limit the amount you eat.  · If you want a salad, choose a garden salad and ask for grilled meats. Avoid extra toppings like brandon, cheese, or fried items. Ask for the dressing on the side, or ask for olive oil and vinegar or lemon to use as dressing.  · Estimate how many servings of a food you are given. For example, a serving of cooked rice is ½ cup or about the size of half a baseball. Knowing serving sizes will help you be aware of how much food you are eating at restaurants. The list below tells you how big or small some common portion sizes are based on everyday objects:  ? 1 oz--4 stacked dice.  ? 3 oz--1 deck of cards.  ? 1 tsp--1  die.  ? 1 Tbsp--½ a ping-pong ball.  ? 2 Tbsp--1 ping-pong ball.  ? ½ cup--½ baseball.  ? 1 cup--1 baseball.  Summary  · Calorie counting means keeping track of how many calories you eat and drink each day. If you eat fewer calories than your body needs, you should lose weight.  · A healthy amount of weight to lose per week is usually 1-2 lb (0.5-0.9 kg). This usually means reducing your daily calorie intake by 500-750 calories.  · The number of calories in a food can be found on a Nutrition Facts label. If a food does not have a Nutrition Facts label, try to look up the calories online or ask your dietitian for help.  · Use your calories on foods and drinks that will fill you up, and not on foods and drinks that will leave you hungry.  · Use smaller plates, glasses, and bowls to prevent overeating.  This information is not intended to replace advice given to you by your health care provider. Make sure you discuss any questions you have with your health care provider.  Document Released: 12/18/2006 Document Revised: 11/17/2017 Document Reviewed: 11/17/2017  [employer] Interactive Patient Education © 2019 [employer]

## 2019-07-23 NOTE — PROGRESS NOTES
Subjective   Amy L Sturgeon is a 49 y.o. female.   Chief Complaint   Patient presents with   • Annual Exam       History of Present Illness   Marcela presents today for an annual wellness exam with pap smear. Last pap smear was 2011 and was WNL.  Hysterectomy with bilateral salpingo-nephrectomy 2005.  last mammogram was 4/6/18 and was BIRADS 1 ordering at [facility] today. She has never had a DEXA scan, but had Colonoscopy and EGD with Dr. Celis 6 years ago at [facility].     Patient just started amlodipine 2 days ago, denies any side effects  Did have labs at [facility] last week  The following portions of the patient's history were reviewed and updated as appropriate: allergies, current medications, past family history, past medical history, past social history, past surgical history and problem list.    Review of Systems   Constitutional: Negative for diaphoresis, fatigue and fever.   Eyes: Negative for pain, discharge, redness and visual disturbance.        Dry eyes and left eye twitching needing to use reading glasses   Respiratory: Negative for cough and shortness of breath.    Cardiovascular: Negative for chest pain, palpitations and leg swelling.   Gastrointestinal: Negative.  Negative for GERD and indigestion ( contolled on omeprozole).   Musculoskeletal: Positive for myalgias ( much better on Gabapentin and cymbalta).   Skin: Negative for rash.   Neurological: Positive for dizziness.   Psychiatric/Behavioral: Positive for depressed mood. The patient is nervous/anxious ( better on medications ).        Objective    Vitals:    07/23/19 1424   BP: 128/94   Pulse: 90   Resp: 20   Temp: 97.6 °F (36.4 °C)   SpO2: 98%       Physical Exam   Constitutional: She is oriented to person, place, and time. She appears well-developed and well-nourished. No distress.   HENT:   Head: Normocephalic and atraumatic.   Right Ear: External ear normal.   Left Ear: External ear normal.   Nose: Nose normal.    Mouth/Throat: Oropharynx is clear and moist.   Eyes: Conjunctivae and EOM are normal. Pupils are equal, round, and reactive to light. Right eye exhibits no discharge. Left eye exhibits no discharge. No scleral icterus.   Neck: Normal range of motion.   Cardiovascular: Normal rate and regular rhythm.   No murmur heard.  Pulmonary/Chest: Effort normal. She has no wheezes. Right breast exhibits no mass, no nipple discharge, no skin change and no tenderness. Left breast exhibits no mass, no nipple discharge, no skin change and no tenderness. Breasts are symmetrical.   Abdominal: Soft. Bowel sounds are normal. She exhibits no distension and no mass. There is no tenderness. No hernia.   obese   Genitourinary: Uterus is absent.   Cervix is absent. Right adnexum displays no tenderness. Left adnexum displays no tenderness. No vaginal discharge found.   Genitourinary Comments: Moderate atrophic changes with mild erythema, vaginal cuff intact, cervix absent Pap of cuff was obtained   Musculoskeletal: Normal range of motion. She exhibits no edema.   Lymphadenopathy:     She has no cervical adenopathy.   Neurological: She is alert and oriented to person, place, and time.   Skin: Skin is warm and dry.   Psychiatric: She has a normal mood and affect.   Nursing note and vitals reviewed.      Reviewed labs through patient's my chart, and requested records from [facility], lipid was at goal thyroid is normal, glucose slightly elevated at 134 patient does report strong family history of diabetes  Assessment/Plan   Marcela was seen today for annual exam.    Diagnoses and all orders for this visit:    Annual physical exam    Hyperglycemia  -     Hemoglobin A1c    Essential hypertension    Class 3 severe obesity due to excess calories without serious comorbidity with body mass index (BMI) of 40.0 to 44.9 in adult (CMS/[facility])    Familial hypercholesterolemia      Continue medications per list.  Discussed low cholesterol low concentrated  sweet diet, calorie reduction, increase physical activity to minimum of 150 minutes/week for weight loss.  Return in about 3 months (around 10/23/2019) for htn.

## 2019-07-24 LAB — HBA1C MFR BLD: 6 % (ref 4.8–5.6)

## 2019-07-25 LAB
CYTOLOGIST CVX/VAG CYTO: NORMAL
CYTOLOGY CVX/VAG DOC CYTO: NORMAL
DX ICD CODE: NORMAL
HIV 1 & 2 AB SER-IMP: NORMAL
OTHER STN SPEC: NORMAL
STAT OF ADQ CVX/VAG CYTO-IMP: NORMAL

## 2019-08-05 ENCOUNTER — TELEPHONE (OUTPATIENT)
Dept: FAMILY MEDICINE CLINIC | Facility: CLINIC | Age: 49
End: 2019-08-05

## 2019-08-05 NOTE — TELEPHONE ENCOUNTER
Spoke with patient, patient wants to change bp medication. Patient used to take losartan/hctz 100-25.  She states she is willing to go back on if you are agreeable. She states her bp has still been running high.  She states it has been about the same 140/101.

## 2019-08-05 NOTE — TELEPHONE ENCOUNTER
Pt calling and states she has been constipated.  She believes it is due to the hydrochlorithiazide.  She states she is always regular.  She hasnt went for 3 days and it has been really firm and hard to pass stools.  Pt states she started the hctz on July 21 and became constipated the next day. No diet change, please advise.

## 2019-08-05 NOTE — TELEPHONE ENCOUNTER
Please send in losartan 50 mg 1 p.o. Daily #30 with 1 refill.   Let her know I do not restart losartan HCTZ as that also contains hydrochlorthiazide.  Have her make an appointment in approximately 2 weeks, and bring any home blood pressure readings to see if needs to be increased further.

## 2019-08-05 NOTE — TELEPHONE ENCOUNTER
This is a possible side effect of starting Hydrochlorothiazide.  Please ask what her blood pressures are doing.  Advised that she must ensure she is getting enough fluids and fiber in her diet.  She could add MiraLAX 17 g daily.  Ask if she would rather change her blood pressure medication.

## 2019-08-06 RX ORDER — LOSARTAN POTASSIUM 50 MG/1
50 TABLET ORAL DAILY
Qty: 30 TABLET | Refills: 1 | Status: SHIPPED | OUTPATIENT
Start: 2019-08-06 | End: 2019-08-19

## 2019-08-19 ENCOUNTER — OFFICE VISIT (OUTPATIENT)
Dept: FAMILY MEDICINE CLINIC | Facility: CLINIC | Age: 49
End: 2019-08-19

## 2019-08-19 VITALS
RESPIRATION RATE: 20 BRPM | DIASTOLIC BLOOD PRESSURE: 95 MMHG | SYSTOLIC BLOOD PRESSURE: 140 MMHG | BODY MASS INDEX: 44.03 KG/M2 | HEART RATE: 84 BPM | OXYGEN SATURATION: 96 % | WEIGHT: 274 LBS | TEMPERATURE: 98.2 F | HEIGHT: 66 IN

## 2019-08-19 DIAGNOSIS — J38.5 LARYNGOSPASMS: ICD-10-CM

## 2019-08-19 DIAGNOSIS — R73.03 PRE-DIABETES: ICD-10-CM

## 2019-08-19 DIAGNOSIS — I10 ESSENTIAL HYPERTENSION: Primary | ICD-10-CM

## 2019-08-19 DIAGNOSIS — Z78.0 MENOPAUSE: ICD-10-CM

## 2019-08-19 DIAGNOSIS — J45.41 MODERATE PERSISTENT ASTHMA WITH ACUTE EXACERBATION: ICD-10-CM

## 2019-08-19 DIAGNOSIS — J30.9 ALLERGIC RHINITIS, UNSPECIFIED SEASONALITY, UNSPECIFIED TRIGGER: ICD-10-CM

## 2019-08-19 DIAGNOSIS — F41.9 ANXIETY: ICD-10-CM

## 2019-08-19 PROCEDURE — 99214 OFFICE O/P EST MOD 30 MIN: CPT | Performed by: FAMILY MEDICINE

## 2019-08-19 RX ORDER — EPINEPHRINE 0.3 MG/.3ML
0.3 INJECTION SUBCUTANEOUS ONCE
Qty: 1 EACH | Refills: 1 | Status: SHIPPED | OUTPATIENT
Start: 2019-08-19 | End: 2019-08-19

## 2019-08-19 RX ORDER — LOSARTAN POTASSIUM 100 MG/1
100 TABLET ORAL DAILY
Qty: 30 TABLET | Refills: 5 | Status: SHIPPED | OUTPATIENT
Start: 2019-08-19 | End: 2020-02-17 | Stop reason: SDUPTHER

## 2019-08-19 RX ORDER — IPRATROPIUM BROMIDE AND ALBUTEROL SULFATE 2.5; .5 MG/3ML; MG/3ML
3 SOLUTION RESPIRATORY (INHALATION) EVERY 4 HOURS PRN
COMMUNITY
End: 2019-08-19

## 2019-08-19 RX ORDER — IPRATROPIUM BROMIDE AND ALBUTEROL SULFATE 2.5; .5 MG/3ML; MG/3ML
3 SOLUTION RESPIRATORY (INHALATION) EVERY 4 HOURS PRN
Qty: 120 VIAL | Refills: 3 | Status: SHIPPED | OUTPATIENT
Start: 2019-08-19 | End: 2021-04-13 | Stop reason: HOSPADM

## 2019-08-19 NOTE — PROGRESS NOTES
Subjective   Amy L Sturgeon is a 49 y.o. female.     History of Present Illness   Marcela returns today for a 2 week follow up on her hypertension per telephone call dated August 5, 2018.  She returned to work as it was causing constipation, change to losartan.  She does not believe losartan is causing constipation.  She is also continuing to take amlodipine 2.5 mg.  She did bring her home wrist blood pressure monitor into the office to compare. Her home wrist monitor read 138/77 compared to an office upper arm large adult cuff 140/95.     Last week to 2 weeks have had increased frequency of SOA and cough. No actual wheezing. She is also requesting a refill on her Duoneb nebulizer today as well, recent hot weather has triggered some increased shortness of breath and need for use.Using once daily along with proventil once a day. Feel like at times airway closes completely. Have not used Breo for many months,originally prescribed by Allergist Dr Joaquin.  no use of other inhaled steroids.     The following portions of the patient's history were reviewed and updated as appropriate: allergies, current medications, past family history, past medical history, past social history, past surgical history and problem list.    Review of Systems   Constitutional: Negative for fatigue and fever.   Respiratory: Positive for cough. Negative for shortness of breath and wheezing.    Cardiovascular: Positive for chest pain ( Chest wall discomfort with cough or deep breath). Negative for palpitations and leg swelling.   Gastrointestinal: Negative.    Skin: Negative for rash.   Psychiatric/Behavioral: Negative for depressed mood.       Objective    Vitals:    08/19/19 1304   BP: 140/95   Pulse: 84   Resp: 20   Temp: 98.2 °F (36.8 °C)   SpO2: 96%       Physical Exam   Constitutional: She is oriented to person, place, and time. She appears well-developed and well-nourished.   HENT:   Head: Normocephalic and atraumatic.   Eyes: Conjunctivae and  EOM are normal. Pupils are equal, round, and reactive to light.   Neck: No JVD present. No thyromegaly present.   Cardiovascular: Normal rate, regular rhythm and normal heart sounds.   No murmur heard.  Pulmonary/Chest: Effort normal and breath sounds normal. She has no wheezes. She has no rales.   Abdominal:   Obese   Musculoskeletal: She exhibits no edema.   Lymphadenopathy:     She has no cervical adenopathy.   Neurological: She is alert and oriented to person, place, and time.   Skin: Skin is warm and dry. No rash noted.   Psychiatric: She has a normal mood and affect.   Nursing note and vitals reviewed.        Assessment/Plan   Marcela was seen today for hypertension.    Diagnoses and all orders for this visit:    Essential hypertension  -     losartan (COZAAR) 100 MG tablet; Take 1 tablet by mouth Daily.    Moderate persistent asthma with acute exacerbation  -     Discontinue: Fluticasone Furoate-Vilanterol (BREO ELLIPTA) 200-25 MCG/INH inhaler; Inhale 1 puff Daily.  -     ipratropium-albuterol (DUO-NEB) 0.5-2.5 mg/3 ml nebulizer; Take 3 mL by nebulization Every 4 (Four) Hours As Needed for Wheezing.  -     EPINEPHrine (EPIPEN 2-MIROSLAVA) 0.3 MG/0.3ML solution auto-injector injection; Inject 0.3 mL into the appropriate muscle as directed by prescriber 1 (One) Time for 1 dose.  -     Fluticasone Furoate-Vilanterol (BREO ELLIPTA) 200-25 MCG/INH inhaler; Inhale 1 puff Daily.    Allergic rhinitis, unspecified seasonality, unspecified trigger  -     EPINEPHrine (EPIPEN 2-MIROSLAVA) 0.3 MG/0.3ML solution auto-injector injection; Inject 0.3 mL into the appropriate muscle as directed by prescriber 1 (One) Time for 1 dose.    Anxiety    Laryngospasms    Menopause  -     DEXA Bone Density Axial; Future    Pre-diabetes    Other orders  -     Discontinue: EPINEPHrine (EPIPEN 2-MIROSLAVA) 0.3 MG/0.3ML solution auto-injector injection; Inject 0.3 mL into the appropriate muscle as directed by prescriber 1 (One) Time for 1 dose.    Increase  Cozaar.  Restart Breo refill DuoNeb's.  Did give prescription for EpiPen.  Advised her symptoms and would like laryngospasm and relaxation/anxiety management would be recommended treatment.  Advised if truly need to use the EpiPen she should call 911 and go to emergency department.  Return in about 4 months (around 12/19/2019).

## 2019-08-19 NOTE — PATIENT INSTRUCTIONS
"DASH Eating Plan  DASH stands for \"Dietary Approaches to Stop Hypertension.\" The DASH eating plan is a healthy eating plan that has been shown to reduce high blood pressure (hypertension). It may also reduce your risk for type 2 diabetes, heart disease, and stroke. The DASH eating plan may also help with weight loss.  What are tips for following this plan?    General guidelines  · Avoid eating more than 2,300 mg (milligrams) of salt (sodium) a day. If you have hypertension, you may need to reduce your sodium intake to 1,500 mg a day.  · Limit alcohol intake to no more than 1 drink a day for nonpregnant women and 2 drinks a day for men. One drink equals 12 oz of beer, 5 oz of wine, or 1½ oz of hard liquor.  · Work with your health care provider to maintain a healthy body weight or to lose weight. Ask what an ideal weight is for you.  · Get at least 30 minutes of exercise that causes your heart to beat faster (aerobic exercise) most days of the week. Activities may include walking, swimming, or biking.  · Work with your health care provider or diet and nutrition specialist (dietitian) to adjust your eating plan to your individual calorie needs.  Reading food labels    · Check food labels for the amount of sodium per serving. Choose foods with less than 5 percent of the Daily Value of sodium. Generally, foods with less than 300 mg of sodium per serving fit into this eating plan.  · To find whole grains, look for the word \"whole\" as the first word in the ingredient list.  Shopping  · Buy products labeled as \"low-sodium\" or \"no salt added.\"  · Buy fresh foods. Avoid canned foods and premade or frozen meals.  Cooking  · Avoid adding salt when cooking. Use salt-free seasonings or herbs instead of table salt or sea salt. Check with your health care provider or pharmacist before using salt substitutes.  · Do not kilgore foods. Cook foods using healthy methods such as baking, boiling, grilling, and broiling instead.  · Cook with " heart-healthy oils, such as olive, canola, soybean, or sunflower oil.  Meal planning  · Eat a balanced diet that includes:  ? 5 or more servings of fruits and vegetables each day. At each meal, try to fill half of your plate with fruits and vegetables.  ? Up to 6-8 servings of whole grains each day.  ? Less than 6 oz of lean meat, poultry, or fish each day. A 3-oz serving of meat is about the same size as a deck of cards. One egg equals 1 oz.  ? 2 servings of low-fat dairy each day.  ? A serving of nuts, seeds, or beans 5 times each week.  ? Heart-healthy fats. Healthy fats called Omega-3 fatty acids are found in foods such as flaxseeds and coldwater fish, like sardines, salmon, and mackerel.  · Limit how much you eat of the following:  ? Canned or prepackaged foods.  ? Food that is high in trans fat, such as fried foods.  ? Food that is high in saturated fat, such as fatty meat.  ? Sweets, desserts, sugary drinks, and other foods with added sugar.  ? Full-fat dairy products.  · Do not salt foods before eating.  · Try to eat at least 2 vegetarian meals each week.  · Eat more home-cooked food and less restaurant, buffet, and fast food.  · When eating at a restaurant, ask that your food be prepared with less salt or no salt, if possible.  What foods are recommended?  The items listed may not be a complete list. Talk with your dietitian about what dietary choices are best for you.  Grains  Whole-grain or whole-wheat bread. Whole-grain or whole-wheat pasta. Brown rice. Oatmeal. Quinoa. Bulgur. Whole-grain and low-sodium cereals. Elise bread. Low-fat, low-sodium crackers. Whole-wheat flour tortillas.  Vegetables  Fresh or frozen vegetables (raw, steamed, roasted, or grilled). Low-sodium or reduced-sodium tomato and vegetable juice. Low-sodium or reduced-sodium tomato sauce and tomato paste. Low-sodium or reduced-sodium canned vegetables.  Fruits  All fresh, dried, or frozen fruit. Canned fruit in natural juice (without  added sugar).  Meat and other protein foods  Skinless chicken or turkey. Ground chicken or turkey. Pork with fat trimmed off. Fish and seafood. Egg whites. Dried beans, peas, or lentils. Unsalted nuts, nut butters, and seeds. Unsalted canned beans. Lean cuts of beef with fat trimmed off. Low-sodium, lean deli meat.  Dairy  Low-fat (1%) or fat-free (skim) milk. Fat-free, low-fat, or reduced-fat cheeses. Nonfat, low-sodium ricotta or cottage cheese. Low-fat or nonfat yogurt. Low-fat, low-sodium cheese.  Fats and oils  Soft margarine without trans fats. Vegetable oil. Low-fat, reduced-fat, or light mayonnaise and salad dressings (reduced-sodium). Canola, safflower, olive, soybean, and sunflower oils. Avocado.  Seasoning and other foods  Herbs. Spices. Seasoning mixes without salt. Unsalted popcorn and pretzels. Fat-free sweets.  What foods are not recommended?  The items listed may not be a complete list. Talk with your dietitian about what dietary choices are best for you.  Grains  Baked goods made with fat, such as croissants, muffins, or some breads. Dry pasta or rice meal packs.  Vegetables  Creamed or fried vegetables. Vegetables in a cheese sauce. Regular canned vegetables (not low-sodium or reduced-sodium). Regular canned tomato sauce and paste (not low-sodium or reduced-sodium). Regular tomato and vegetable juice (not low-sodium or reduced-sodium). Pickles. Olives.  Fruits  Canned fruit in a light or heavy syrup. Fried fruit. Fruit in cream or butter sauce.  Meat and other protein foods  Fatty cuts of meat. Ribs. Fried meat. Upton. Sausage. Bologna and other processed lunch meats. Salami. Fatback. Hotdogs. Bratwurst. Salted nuts and seeds. Canned beans with added salt. Canned or smoked fish. Whole eggs or egg yolks. Chicken or turkey with skin.  Dairy  Whole or 2% milk, cream, and half-and-half. Whole or full-fat cream cheese. Whole-fat or sweetened yogurt. Full-fat cheese. Nondairy creamers. Whipped toppings.  Processed cheese and cheese spreads.  Fats and oils  Butter. Stick margarine. Lard. Shortening. Ghee. Upton fat. Tropical oils, such as coconut, palm kernel, or palm oil.  Seasoning and other foods  Salted popcorn and pretzels. Onion salt, garlic salt, seasoned salt, table salt, and sea salt. Worcestershire sauce. Tartar sauce. Barbecue sauce. Teriyaki sauce. Soy sauce, including reduced-sodium. Steak sauce. Canned and packaged gravies. Fish sauce. Oyster sauce. Cocktail sauce. Horseradish that you find on the shelf. Ketchup. Mustard. Meat flavorings and tenderizers. Bouillon cubes. Hot sauce and Tabasco sauce. Premade or packaged marinades. Premade or packaged taco seasonings. Relishes. Regular salad dressings.  Where to find more information:  · National Heart, Lung, and Blood [place]: www.nhlbi.nih.gov  · American Heart Association: www.heart.org  Summary  · The DASH eating plan is a healthy eating plan that has been shown to reduce high blood pressure (hypertension). It may also reduce your risk for type 2 diabetes, heart disease, and stroke.  · With the DASH eating plan, you should limit salt (sodium) intake to 2,300 mg a day. If you have hypertension, you may need to reduce your sodium intake to 1,500 mg a day.  · When on the DASH eating plan, aim to eat more fresh fruits and vegetables, whole grains, lean proteins, low-fat dairy, and heart-healthy fats.  · Work with your health care provider or diet and nutrition specialist (dietitian) to adjust your eating plan to your individual calorie needs.  This information is not intended to replace advice given to you by your health care provider. Make sure you discuss any questions you have with your health care provider.  Document Released: 12/06/2012 Document Revised: 12/11/2017 Document Reviewed: 12/11/2017  [employer] Interactive Patient Education © 2019 [employer]    Asthma, Adult    Asthma is a long-term (chronic) condition that causes recurrent episodes in  which the airways become tight and narrow. The airways are the passages that lead from the nose and mouth down into the lungs. Asthma episodes, also called asthma attacks, can cause coughing, wheezing, shortness of breath, and chest pain. The airways can also fill with mucus. During an attack, it can be difficult to breathe. Asthma attacks can range from minor to life threatening.  Asthma cannot be cured, but medicines and lifestyle changes can help control it and treat acute attacks.  What are the causes?  This condition is believed to be caused by inherited (genetic) and environmental factors, but its exact cause is not known.  There are many things that can bring on an asthma attack or make asthma symptoms worse (triggers). Asthma triggers are different for each person. Common triggers include:  · Mold.  · Dust.  · Cigarette smoke.  · Cockroaches.  · Things that can cause allergy symptoms (allergens), such as animal dander or pollen from trees or grass.  · Air pollutants such as household , wood smoke, smog, or chemical odors.  · Cold air, weather changes, and winds (which increase molds and pollen in the air).  · Strong emotional expressions such as crying or laughing hard.  · Stress.  · Certain medicines (such as aspirin) or types of medicines (such as beta-blockers).  · Sulfites in foods and drinks. Foods and drinks that may contain sulfites include dried fruit, potato chips, and sparkling grape juice.  · Infections or inflammatory conditions such as the flu, a cold, or inflammation of the nasal membranes (rhinitis).  · Gastroesophageal reflux disease (GERD).  · Exercise or strenuous activity.  What are the signs or symptoms?  Symptoms of this condition may occur right after asthma is triggered or many hours later. Symptoms include:  · Wheezing. This can sound like whistling when you breathe.  · Excessive nighttime or early morning coughing.  · Frequent or severe coughing with a common cold.  · Chest  tightness.  · Shortness of breath.  · Tiredness (fatigue) with minimal activity.  How is this diagnosed?  This condition is diagnosed based on:  · Your medical history.  · A physical exam.  · Tests, which may include:  ? Lung function studies and pulmonary studies (spirometry). These tests can evaluate the flow of air in your lungs.  ? Allergy tests.  ? Imaging tests, such as X-rays.  How is this treated?  There is no cure for this condition, but treatment can help control your symptoms. Treatment for asthma usually involves:  · Identifying and avoiding your asthma triggers.  · Using medicines to control your symptoms. Generally, two types of medicines are used to treat asthma:  ? Controller medicines. These help prevent asthma symptoms from occurring. They are usually taken every day.  ? Fast-acting reliever or rescue medicines. These quickly relieve asthma symptoms by widening the narrow and tight airways. They are used as needed and provide short-term relief.  · Using supplemental oxygen. This may be needed during a severe episode.  · Using other medicines, such as:  ? Allergy medicines, such as antihistamines, if your asthma attacks are triggered by allergens.  ? Immune medicines (immunomodulators). These are medicines that help control the immune system.  · Creating an asthma action plan. An asthma action plan is a written plan for managing and treating your asthma attacks. This plan includes:  ? A list of your asthma triggers and how to avoid them.  ? Information about when medicines should be taken and when their dosage should be changed.  ? Instructions about using a device called a peak flow meter. A peak flow meter measures how well the lungs are working and the severity of your asthma. It helps you monitor your condition.  Follow these instructions at home:  Controlling your home environment  Control your home environment in the following ways to help avoid triggers and prevent asthma attacks:  · Change  your heating and air conditioning filter regularly.  · Limit your use of fireplaces and wood stoves.  · Get rid of pests (such as roaches and mice) and their droppings.  · Throw away plants if you see mold on them.  · Clean floors and dust surfaces regularly. Use unscented cleaning products.  · Try to have someone else vacuum for you regularly. Stay out of rooms while they are being vacuumed and for a short while afterward. If you vacuum, use a dust mask from a hardware store, a double-layered or microfilter vacuum  bag, or a vacuum  with a HEPA filter.  · Replace carpet with wood, tile, or vinyl tamika. Carpet can trap dander and dust.  · Use allergy-proof pillows, mattress covers, and box spring covers.  · Keep your bedroom a trigger-free room.  · Avoid pets and keep windows closed when allergens are in the air.  · Wash beddings every week in hot water and dry them in a dryer.  · Use blankets that are made of polyester or cotton.  · Clean bathrooms and sagrario with bleach. If possible, have someone repaint the walls in these rooms with mold-resistant paint. Stay out of the rooms that are being cleaned and painted.  · Wash your hands often with soap and water. If soap and water are not available, use hand .  · Do not allow anyone to smoke in your home.  General instructions  · Take over-the-counter and prescription medicines only as told by your health care provider.  ? Speak with your health care provider if you have questions about how or when to take the medicines.  ? Make note if you are requiring more frequent dosages.  · Do not use any products that contain nicotine or tobacco, such as cigarettes and e-cigarettes. If you need help quitting, ask your health care provider. Also, avoid being exposed to secondhand smoke.  · Use a peak flow meter as told by your health care provider. Record and keep track of the readings.  · Understand and use the asthma action plan to help minimize, or  stop an asthma attack, without needing to seek medical care.  · Make sure you stay up to date on your yearly vaccinations as told by your health care provider. This may include vaccines for the flu and pneumonia.  · Avoid outdoor activities when allergen counts are high and when air quality is low.  · Wear a ski mask that covers your nose and mouth during outdoor winter activities. Exercise indoors on cold days if you can.  · Warm up before exercising, and take time for a cool-down period after exercise.  · Keep all follow-up visits as told by your health care provider. This is important.  Where to find more information  · For information about asthma, turn to the Centers for Disease Control and Prevention at www.cdc.gov/asthma/faqs.htm  · For air quality information, turn to AirNow at https://airnow.gov/  Contact a health care provider if:  · You have wheezing, shortness of breath, or a cough even while you are taking medicine to prevent attacks.  · The mucus you cough up (sputum) is thicker than usual.  · Your sputum changes from clear or white to yellow, green, gray, or bloody.  · Your medicines are causing side effects, such as a rash, itching, swelling, or trouble breathing.  · You need to use a reliever medicine more than 2-3 times a week.  · Your peak flow reading is still at 50-79% of your personal best after following your action plan for 1 hour.  · You have a fever.  Get help right away if:  · You are getting worse and do not respond to treatment during an asthma attack.  · You are short of breath when at rest or when doing very little physical activity.  · You have difficulty eating, drinking, or talking.  · You have chest pain or tightness.  · You develop a fast heartbeat or palpitations.  · You have a bluish color to your lips or fingernails.  · You are light-headed or dizzy, or you faint.  · Your peak flow reading is less than 50% of your personal best.  · You feel too tired to breathe  normally.  Summary  · Asthma is a long-term (chronic) condition that causes recurrent episodes in which the airways become tight and narrow. These episodes can cause coughing, wheezing, shortness of breath, and chest pain.  · Asthma cannot be cured, but medicines and lifestyle changes can help control it and treat acute attacks.  · Make sure you understand how to avoid triggers and how and when to use your medicines.  · Asthma attacks can range from minor to life threatening. Get help right away if you have an asthma attack and do not respond to treatment with your usual rescue medicines.  This information is not intended to replace advice given to you by your health care provider. Make sure you discuss any questions you have with your health care provider.  Document Released: 12/18/2006 Document Revised: 01/22/2018 Document Reviewed: 01/22/2018  [employer] Interactive Patient Education © 2019 [employer]    Preventing Type 2 Diabetes Mellitus  Type 2 diabetes (type 2 diabetes mellitus) is a long-term (chronic) disease that affects blood sugar (glucose) levels. Normally, a hormone called insulin allows glucose to enter cells in the body. The cells use glucose for energy. In type 2 diabetes, one or both of these problems may be present:  · The body does not make enough insulin.  · The body does not respond properly to insulin that it makes (insulin resistance).  Insulin resistance or lack of insulin causes excess glucose to build up in the blood instead of going into cells. As a result, high blood glucose (hyperglycemia) develops, which can cause many complications. Being overweight or obese and having an inactive (sedentary) lifestyle can increase your risk for diabetes. Type 2 diabetes can be delayed or prevented by making certain nutrition and lifestyle changes.  What nutrition changes can be made?    · Eat healthy meals and snacks regularly. Keep a healthy snack with you for when you get hungry between meals,  such as fruit or a handful of nuts.  · Eat lean meats and proteins that are low in saturated fats, such as chicken, fish, egg whites, and beans. Avoid processed meats.  · Eat plenty of fruits and vegetables and plenty of grains that have not been processed (whole grains). It is recommended that you eat:  ? 1½?2 cups of fruit every day.  ? 2½?3 cups of vegetables every day.  ? 6?8 oz of whole grains every day, such as oats, whole wheat, bulgur, brown rice, quinoa, and millet.  · Eat low-fat dairy products, such as milk, yogurt, and cheese.  · Eat foods that contain healthy fats, such as nuts, avocado, olive oil, and canola oil.  · Drink water throughout the day. Avoid drinks that contain added sugar, such as soda or sweet tea.  · Follow instructions from your health care provider about specific eating or drinking restrictions.  · Control how much food you eat at a time (portion size).  ? Check food labels to find out the serving sizes of foods.  ? Use a kitchen scale to weigh amounts of foods.  · Saute or steam food instead of frying it. Cook with water or broth instead of oils or butter.  · Limit your intake of:  ? Salt (sodium). Have no more than 1 tsp (2,400 mg) of sodium a day. If you have heart disease or high blood pressure, have less than ½?¾ tsp (1,500 mg) of sodium a day.  ? Saturated fat. This is fat that is solid at room temperature, such as butter or fat on meat.  What lifestyle changes can be made?  Activity    · Do moderate-intensity physical activity for at least 30 minutes on at least 5 days of the week, or as much as told by your health care provider.  · Ask your health care provider what activities are safe for you. A mix of physical activities may be best, such as walking, swimming, cycling, and strength training.  · Try to add physical activity into your day. For example:  ? Park in spots that are farther away than usual, so that you walk more. For example, park in a far corner of the parking lot  when you go to the office or the grocery store.  ? Take a walk during your lunch break.  ? Use stairs instead of elevators or escalators.  Weight Loss  · Lose weight as directed. Your health care provider can determine how much weight loss is best for you and can help you lose weight safely.  · If you are overweight or obese, you may be instructed to lose at least 5?7 % of your body weight.  Alcohol and Tobacco    · Limit alcohol intake to no more than 1 drink a day for nonpregnant women and 2 drinks a day for men. One drink equals 12 oz of beer, 5 oz of wine, or 1½ oz of hard liquor.  · Do not use any tobacco products, such as cigarettes, chewing tobacco, and e-cigarettes. If you need help quitting, ask your health care provider.  Work With Your Health Care Provider  · Have your blood glucose tested regularly, as told by your health care provider.  · Discuss your risk factors and how you can reduce your risk for diabetes.  · Get screening tests as told by your health care provider. You may have screening tests regularly, especially if you have certain risk factors for type 2 diabetes.  · Make an appointment with a diet and nutrition specialist (registered dietitian). A registered dietitian can help you make a healthy eating plan and can help you understand portion sizes and food labels.  Why are these changes important?  · It is possible to prevent or delay type 2 diabetes and related health problems by making lifestyle and nutrition changes.  · It can be difficult to recognize signs of type 2 diabetes. The best way to avoid possible damage to your body is to take actions to prevent the disease before you develop symptoms.  What can happen if changes are not made?  · Your blood glucose levels may keep increasing. Having high blood glucose for a long time is dangerous. Too much glucose in your blood can damage your blood vessels, heart, kidneys, nerves, and eyes.  · You may develop prediabetes or type 2 diabetes.  Type 2 diabetes can lead to many chronic health problems and complications, such as:  ? Heart disease.  ? Stroke.  ? Blindness.  ? Kidney disease.  ? Depression.  ? Poor circulation in the feet and legs, which could lead to surgical removal (amputation) in severe cases.  Where to find support  · Ask your health care provider to recommend a registered dietitian, diabetes educator, or weight loss program.  · Look for local or online weight loss groups.  · Join a gym, fitness club, or outdoor activity group, such as a walking club.  Where to find more information  To learn more about diabetes and diabetes prevention, visit:  · American Diabetes Association (ADA): www.diabetes.org  · National [place] of Diabetes and Digestive and Kidney Diseases: www.niddk.nih.gov/health-information/diabetes  To learn more about healthy eating, visit:  · The U.S. Department of Agriculture (USDA), Choose My Plate: www.[employer].gov/food-groups  · Office of Disease Prevention and Health Promotion (ODPHP), Dietary Guidelines: www.health.gov/dietaryguidelines  Summary  · You can reduce your risk for type 2 diabetes by increasing your physical activity, eating healthy foods, and losing weight as directed.  · Talk with your health care provider about your risk for type 2 diabetes. Ask about any blood tests or screening tests that you need to have.  This information is not intended to replace advice given to you by your health care provider. Make sure you discuss any questions you have with your health care provider.  Document Released: 04/10/2017 Document Revised: 11/29/2018 Document Reviewed: 02/07/2017  [employer] Interactive Patient Education © 2019 [employer]

## 2019-10-03 ENCOUNTER — TELEPHONE (OUTPATIENT)
Dept: NEUROLOGY | Facility: CLINIC | Age: 49
End: 2019-10-03

## 2019-10-14 ENCOUNTER — TELEPHONE (OUTPATIENT)
Dept: FAMILY MEDICINE CLINIC | Facility: CLINIC | Age: 49
End: 2019-10-14

## 2019-10-14 RX ORDER — OMEPRAZOLE 20 MG/1
20 CAPSULE, DELAYED RELEASE ORAL 2 TIMES DAILY
Qty: 60 CAPSULE | Refills: 11 | Status: SHIPPED | OUTPATIENT
Start: 2019-10-14 | End: 2020-01-08 | Stop reason: SDUPTHER

## 2019-10-14 NOTE — TELEPHONE ENCOUNTER
Is okay to change to 20 mg twice daily please call to her pharmacy as requested with 1 year of refills

## 2019-10-14 NOTE — TELEPHONE ENCOUNTER
Pt calling wanting 2 --20mg tablets of her omeprazole called into pharmacy, she doesn't want to take the 1 40mg tablet. She says she wants to take one in the am and one in the pm.

## 2019-11-05 DIAGNOSIS — Z78.0 MENOPAUSE: ICD-10-CM

## 2019-11-05 DIAGNOSIS — Z12.39 SCREENING FOR BREAST CANCER: ICD-10-CM

## 2019-11-08 ENCOUNTER — OFFICE VISIT (OUTPATIENT)
Dept: FAMILY MEDICINE CLINIC | Facility: CLINIC | Age: 49
End: 2019-11-08

## 2019-11-08 VITALS
WEIGHT: 263 LBS | BODY MASS INDEX: 42.27 KG/M2 | SYSTOLIC BLOOD PRESSURE: 141 MMHG | HEART RATE: 87 BPM | TEMPERATURE: 97 F | HEIGHT: 66 IN | OXYGEN SATURATION: 97 % | DIASTOLIC BLOOD PRESSURE: 90 MMHG

## 2019-11-08 DIAGNOSIS — K59.01 SLOW TRANSIT CONSTIPATION: ICD-10-CM

## 2019-11-08 DIAGNOSIS — G47.33 OBSTRUCTIVE SLEEP APNEA: ICD-10-CM

## 2019-11-08 DIAGNOSIS — R73.03 PREDIABETES: ICD-10-CM

## 2019-11-08 DIAGNOSIS — K58.2 IRRITABLE BOWEL SYNDROME WITH BOTH CONSTIPATION AND DIARRHEA: Primary | ICD-10-CM

## 2019-11-08 DIAGNOSIS — E78.01 FAMILIAL HYPERCHOLESTEROLEMIA: ICD-10-CM

## 2019-11-08 DIAGNOSIS — F33.41 RECURRENT MAJOR DEPRESSIVE DISORDER, IN PARTIAL REMISSION (HCC): ICD-10-CM

## 2019-11-08 DIAGNOSIS — I10 ESSENTIAL HYPERTENSION: ICD-10-CM

## 2019-11-08 DIAGNOSIS — F51.01 PRIMARY INSOMNIA: ICD-10-CM

## 2019-11-08 PROCEDURE — 99214 OFFICE O/P EST MOD 30 MIN: CPT | Performed by: FAMILY MEDICINE

## 2019-11-08 RX ORDER — DULOXETIN HYDROCHLORIDE 60 MG/1
60 CAPSULE, DELAYED RELEASE ORAL DAILY
Qty: 30 CAPSULE | Refills: 12 | Status: SHIPPED | OUTPATIENT
Start: 2019-11-08 | End: 2020-02-17 | Stop reason: SDUPTHER

## 2019-11-08 RX ORDER — TRAZODONE HYDROCHLORIDE 50 MG/1
50 TABLET ORAL NIGHTLY
COMMUNITY
End: 2019-11-08

## 2019-11-08 RX ORDER — POLYETHYLENE GLYCOL 3350 17 G/17G
8.5 POWDER, FOR SOLUTION ORAL DAILY
Qty: 500 G | Refills: 3 | Status: SHIPPED | OUTPATIENT
Start: 2019-11-08 | End: 2021-01-19

## 2019-11-08 RX ORDER — TRAZODONE HYDROCHLORIDE 50 MG/1
50 TABLET ORAL NIGHTLY
Qty: 30 TABLET | Refills: 5 | Status: SHIPPED | OUTPATIENT
Start: 2019-11-08 | End: 2020-02-17 | Stop reason: SDUPTHER

## 2019-11-08 RX ORDER — POLYETHYLENE GLYCOL 3350 17 G/17G
17 POWDER, FOR SOLUTION ORAL DAILY
COMMUNITY
End: 2019-11-08

## 2019-11-09 NOTE — PROGRESS NOTES
Chief Complaint   Patient presents with   • constipation       Subjective   History of Present Illness   Amy L Sturgeon is a 49 y.o. female.  She presents today with major concern of constipation.  Reports her bowel movement are typically every 3 days, when she uses MiraLAX 1/2 capful she can have a bowel movement daily, but was uncertain if she should continue frequent use.  She has not tried other remedies such as stool softeners.  She has been longstanding history of irritable bowel syndrome and until the past year or so had more trouble with diarrhea having frequent bowel movements daily.  She states current symptoms started prior to her blood pressure medication changes and does not believe it is a side effect of the medication.     Patient has history of acoustic neuroma and has been seen specialists in [place].  They had referred her to psychiatrist in [place] for depression but they are releasing her now for follow-up in 1 year and the psychiatrist at the cancer center is asking her to continue treatment for depression through her primary care physician.  She states her depression is currently stable on Cymbalta and her insomnia is adequately treated with trazodone and is requesting refills today.    Additionally she has recently seen sleep specialist for sleep apnea.  She has had intolerance to CPAP treatment and he is referring her to ENT for possible surgery.  She presents a recent CT of soft tissue of neck which shows prominence of her tonsils and nonspecific level 2 enlargement of lymph nodes.    The following portions of the patient's history were reviewed and updated as appropriate: allergies, current medications, past family history, past medical history, past social history, past surgical history and problem list.    Current Outpatient Medications on File Prior to Visit   Medication Sig   • albuterol sulfate  (90 Base) MCG/ACT inhaler Inhale 2 puffs Every 4 (Four) Hours As Needed.   •  amLODIPine (NORVASC) 2.5 MG tablet Take 1 tablet by mouth Daily.   • atorvastatin (LIPITOR) 20 MG tablet Take 1 tablet by mouth Every Night.   • cyclobenzaprine (FLEXERIL) 10 MG tablet Take 10 mg by mouth 3 (Three) Times a Day As Needed for Muscle Spasms.   • cycloSPORINE (RESTASIS) 0.05 % ophthalmic emulsion Apply 1 drop to eye(s) as directed by provider 2 (Two) Times a Day.   • fluconazole (DIFLUCAN) 200 MG tablet Take 1 tablet by mouth 1 (One) Time Per Week. X 3 months   • fluticasone (FLONASE) 50 MCG/ACT nasal spray 2 sprays into the nostril(s) as directed by provider Daily.   • Fluticasone Furoate-Vilanterol (BREO ELLIPTA) 200-25 MCG/INH inhaler Inhale 1 puff Daily.   • gabapentin (NEURONTIN) 800 MG tablet Take 1 tablet by mouth 3 (Three) Times a Day. 1 tab po qd and 2 tabs po q hs   • hydrOXYzine pamoate (VISTARIL) 25 MG capsule Take 1 capsule by mouth 3 (Three) Times a Day As Needed.   • ipratropium-albuterol (DUO-NEB) 0.5-2.5 mg/3 ml nebulizer Take 3 mL by nebulization Every 4 (Four) Hours As Needed for Wheezing.   • loratadine (CLARITIN) 10 MG tablet Take 1 tablet by mouth Daily.   • losartan (COZAAR) 100 MG tablet Take 1 tablet by mouth Daily.   • meclizine (ANTIVERT) 12.5 MG tablet Take 1 tablet by mouth 3 (Three) Times a Day As Needed.   • MYRBETRIQ 50 MG tablet sustained-release 24 hour 24 hr tablet Take 1 tablet by mouth Daily.   • omeprazole (PRILOSEC) 20 MG capsule Take 1 capsule by mouth 2 (Two) Times a Day. (Patient taking differently: Take 40 mg by mouth Daily.)   • piroxicam (FELDENE) 20 MG capsule Take 1 capsule by mouth Daily. with food     No current facility-administered medications on file prior to visit.          Review of Systems   Constitutional: Negative for fatigue and fever.   HENT: Positive for hearing loss.    Respiratory: Negative for cough, shortness of breath and wheezing.    Cardiovascular: Negative for palpitations and leg swelling. Chest pain:     Gastrointestinal: Positive for  "constipation. Negative for abdominal pain, blood in stool and diarrhea.   Skin: Negative for rash.   Neurological: Positive for dizziness.   Psychiatric/Behavioral: Negative for depressed mood.     I have reviewed the ROS if documented by the MA. Rosa Elena Turcios MD     Objective   Vitals:    11/08/19 1559   BP: 141/90   BP Location: Left arm   Patient Position: Sitting   Cuff Size: Adult   Pulse: 87   Temp: 97 °F (36.1 °C)   TempSrc: Oral   SpO2: 97%   Weight: 119 kg (263 lb)   Height: 166.4 cm (65.51\")      Body mass index is 43.08 kg/m².    Physical Exam   Constitutional: She is oriented to person, place, and time. She appears well-developed and well-nourished.   HENT:   Head: Normocephalic and atraumatic.   Eyes: Conjunctivae and EOM are normal. Pupils are equal, round, and reactive to light.   Neck: No JVD present. No thyromegaly present.   Cardiovascular: Normal rate, regular rhythm and normal heart sounds.   No murmur heard.  Pulmonary/Chest: Effort normal and breath sounds normal. She has no wheezes. She has no rales.   Abdominal: Soft. Bowel sounds are normal. She exhibits no distension and no mass. There is no tenderness.   Obese   Musculoskeletal: She exhibits no edema.   Lymphadenopathy:     She has no cervical adenopathy.   Neurological: She is alert and oriented to person, place, and time.   Skin: Skin is warm and dry. No rash noted.   Psychiatric: She has a normal mood and affect.   Nursing note and vitals reviewed.          Lab Results   Component Value Date    HGBA1C 6.0 (H) 07/23/2019        Assessment/Plan   Diagnoses and all orders for this visit:    1. Irritable bowel syndrome with both constipation and diarrhea (Primary)  -     polyethylene glycol (MIRALAX) powder; Take 9 g by mouth Daily.  Dispense: 500 g; Refill: 3    2. Slow transit constipation  -     polyethylene glycol (MIRALAX) powder; Take 9 g by mouth Daily.  Dispense: 500 g; Refill: 3  -     TSH; Future    3. Recurrent major " depressive disorder, in partial remission (CMS/HCC)  -     traZODone (DESYREL) 50 MG tablet; Take 1 tablet by mouth Every Night.  Dispense: 30 tablet; Refill: 5  -     DULoxetine (CYMBALTA) 60 MG capsule; Take 1 capsule by mouth Daily.  Dispense: 30 capsule; Refill: 12  -     TSH; Future    4. Primary insomnia  -     traZODone (DESYREL) 50 MG tablet; Take 1 tablet by mouth Every Night.  Dispense: 30 tablet; Refill: 5    5. Obstructive sleep apnea    6. Familial hypercholesterolemia  -     Lipid Panel; Future    7. Essential hypertension  -     Comprehensive Metabolic Panel; Future    8. Prediabetes  -     Hemoglobin A1c; Future        Discussed multiple treatments for irritable bowel with constipation including but not limited to continuing MiraLAX or changing to Linzess or amitiza or referral To GI.  She is comfortable continuing MiraLAX, I advised her to take half dose every day increasing or decreasing if needed due to constipation or diarrhea.      Depression is currently stable, renewing Cymbalta and trazodone as requested.    Obstructive sleep apnea, currently untreated, advised to continue with specialists until a satisfactory solution is found    Discussed dash diet       Return in about 2 months (around 1/8/2020) for Recheck and follow up on other chroic conditions, with Labs prior.      AVS given

## 2020-01-02 ENCOUNTER — RESULTS ENCOUNTER (OUTPATIENT)
Dept: FAMILY MEDICINE CLINIC | Facility: CLINIC | Age: 50
End: 2020-01-02

## 2020-01-02 DIAGNOSIS — R73.03 PREDIABETES: ICD-10-CM

## 2020-01-02 DIAGNOSIS — F33.41 RECURRENT MAJOR DEPRESSIVE DISORDER, IN PARTIAL REMISSION (HCC): ICD-10-CM

## 2020-01-02 DIAGNOSIS — K59.01 SLOW TRANSIT CONSTIPATION: ICD-10-CM

## 2020-01-02 DIAGNOSIS — E78.01 FAMILIAL HYPERCHOLESTEROLEMIA: ICD-10-CM

## 2020-01-02 DIAGNOSIS — I10 ESSENTIAL HYPERTENSION: ICD-10-CM

## 2020-01-07 ENCOUNTER — TELEPHONE (OUTPATIENT)
Dept: FAMILY MEDICINE CLINIC | Facility: CLINIC | Age: 50
End: 2020-01-07

## 2020-01-07 NOTE — TELEPHONE ENCOUNTER
PT CALLED AND HAD TO CANCEL HER APPOINTMENT SHE HAS TOMORROW, SHE SAID SHE WONT HAVE INSURANCE TILL MAY- SHE IS NEEDING A MED REFILL ON ALL HER MEDS- [facility]

## 2020-01-08 DIAGNOSIS — J45.41 MODERATE PERSISTENT ASTHMA WITH ACUTE EXACERBATION: ICD-10-CM

## 2020-01-08 NOTE — TELEPHONE ENCOUNTER
Pt called back and stated that these are the drugs she needed called in to [facility]    Last Ov 12/20/19  Labs 12/30/2019

## 2020-01-09 RX ORDER — AMLODIPINE BESYLATE 2.5 MG/1
2.5 TABLET ORAL DAILY
Qty: 90 TABLET | Refills: 3 | Status: SHIPPED | OUTPATIENT
Start: 2020-01-09 | End: 2020-12-10 | Stop reason: SDUPTHER

## 2020-01-09 RX ORDER — HYDROXYZINE PAMOATE 25 MG/1
25 CAPSULE ORAL 3 TIMES DAILY PRN
Qty: 30 CAPSULE | Refills: 1 | Status: SHIPPED | OUTPATIENT
Start: 2020-01-09 | End: 2020-12-01 | Stop reason: SDUPTHER

## 2020-01-09 RX ORDER — ATORVASTATIN CALCIUM 20 MG/1
20 TABLET, FILM COATED ORAL NIGHTLY
Qty: 90 TABLET | Refills: 3 | Status: SHIPPED | OUTPATIENT
Start: 2020-01-09 | End: 2020-12-10 | Stop reason: SDUPTHER

## 2020-01-09 RX ORDER — OMEPRAZOLE 20 MG/1
40 CAPSULE, DELAYED RELEASE ORAL DAILY
Qty: 180 CAPSULE | Refills: 3 | Status: SHIPPED | OUTPATIENT
Start: 2020-01-09 | End: 2020-02-17 | Stop reason: SDUPTHER

## 2020-01-09 RX ORDER — GABAPENTIN 800 MG/1
800 TABLET ORAL 3 TIMES DAILY
Qty: 270 TABLET | Refills: 1 | Status: SHIPPED | OUTPATIENT
Start: 2020-01-09 | End: 2020-02-17 | Stop reason: SDUPTHER

## 2020-02-17 DIAGNOSIS — F51.01 PRIMARY INSOMNIA: ICD-10-CM

## 2020-02-17 DIAGNOSIS — F33.41 RECURRENT MAJOR DEPRESSIVE DISORDER, IN PARTIAL REMISSION (HCC): ICD-10-CM

## 2020-02-17 DIAGNOSIS — I10 ESSENTIAL HYPERTENSION: ICD-10-CM

## 2020-02-17 RX ORDER — LOSARTAN POTASSIUM 100 MG/1
100 TABLET ORAL DAILY
Qty: 30 TABLET | Refills: 2 | Status: SHIPPED | OUTPATIENT
Start: 2020-02-17 | End: 2020-05-29

## 2020-02-17 RX ORDER — GABAPENTIN 800 MG/1
800 TABLET ORAL 3 TIMES DAILY
Qty: 270 TABLET | Refills: 0 | Status: SHIPPED | OUTPATIENT
Start: 2020-02-17 | End: 2020-12-01 | Stop reason: SDUPTHER

## 2020-02-17 RX ORDER — TRAZODONE HYDROCHLORIDE 50 MG/1
50 TABLET ORAL NIGHTLY
Qty: 30 TABLET | Refills: 2 | Status: SHIPPED | OUTPATIENT
Start: 2020-02-17 | End: 2020-12-01 | Stop reason: SDUPTHER

## 2020-02-17 RX ORDER — OMEPRAZOLE 40 MG/1
40 CAPSULE, DELAYED RELEASE ORAL DAILY
Qty: 90 CAPSULE | Refills: 1 | Status: SHIPPED | OUTPATIENT
Start: 2020-02-17 | End: 2020-02-18 | Stop reason: SDUPTHER

## 2020-02-17 RX ORDER — OMEPRAZOLE 20 MG/1
40 CAPSULE, DELAYED RELEASE ORAL DAILY
Qty: 90 CAPSULE | Refills: 1 | Status: SHIPPED | OUTPATIENT
Start: 2020-02-17 | End: 2020-02-17 | Stop reason: SDUPTHER

## 2020-02-17 RX ORDER — DULOXETIN HYDROCHLORIDE 60 MG/1
60 CAPSULE, DELAYED RELEASE ORAL DAILY
Qty: 30 CAPSULE | Refills: 9 | Status: SHIPPED | OUTPATIENT
Start: 2020-02-17 | End: 2020-12-10 | Stop reason: SDUPTHER

## 2020-02-17 NOTE — TELEPHONE ENCOUNTER
resending medications to different pharmacy.  Pts has no insurance and doesn't need to use [facility] anymore.

## 2020-02-18 RX ORDER — OMEPRAZOLE 40 MG/1
40 CAPSULE, DELAYED RELEASE ORAL DAILY
Qty: 90 CAPSULE | Refills: 1 | Status: SHIPPED | OUTPATIENT
Start: 2020-02-18 | End: 2020-08-04

## 2020-03-20 RX ORDER — ALBUTEROL SULFATE 90 UG/1
AEROSOL, METERED RESPIRATORY (INHALATION)
Qty: 18 G | Refills: 5 | Status: SHIPPED | OUTPATIENT
Start: 2020-03-20 | End: 2021-06-01 | Stop reason: DRUGHIGH

## 2020-05-27 DIAGNOSIS — I10 ESSENTIAL HYPERTENSION: ICD-10-CM

## 2020-05-29 RX ORDER — LOSARTAN POTASSIUM 100 MG/1
TABLET ORAL
Qty: 30 TABLET | Refills: 2 | Status: SHIPPED | OUTPATIENT
Start: 2020-05-29 | End: 2020-08-04

## 2020-08-04 DIAGNOSIS — I10 ESSENTIAL HYPERTENSION: ICD-10-CM

## 2020-08-04 RX ORDER — LOSARTAN POTASSIUM 100 MG/1
TABLET ORAL
Qty: 90 TABLET | Refills: 0 | Status: SHIPPED | OUTPATIENT
Start: 2020-08-04 | End: 2020-12-01 | Stop reason: SDUPTHER

## 2020-08-04 RX ORDER — OMEPRAZOLE 40 MG/1
CAPSULE, DELAYED RELEASE ORAL
Qty: 90 CAPSULE | Refills: 0 | Status: SHIPPED | OUTPATIENT
Start: 2020-08-04 | End: 2020-12-01 | Stop reason: SDUPTHER

## 2020-08-04 NOTE — TELEPHONE ENCOUNTER
Please inform patient she has not been seen since November.  I am renewing medications as requested but I do need her to schedule an appointment for follow-up on her chronic medical conditions including a blood pressure check and  labs.  Thank you

## 2020-12-01 ENCOUNTER — OFFICE VISIT (OUTPATIENT)
Dept: FAMILY MEDICINE CLINIC | Facility: CLINIC | Age: 50
End: 2020-12-01

## 2020-12-01 VITALS
OXYGEN SATURATION: 100 % | SYSTOLIC BLOOD PRESSURE: 128 MMHG | BODY MASS INDEX: 40.48 KG/M2 | TEMPERATURE: 97.3 F | HEIGHT: 65 IN | DIASTOLIC BLOOD PRESSURE: 90 MMHG | HEART RATE: 76 BPM | WEIGHT: 243 LBS

## 2020-12-01 DIAGNOSIS — F33.41 RECURRENT MAJOR DEPRESSIVE DISORDER, IN PARTIAL REMISSION (HCC): ICD-10-CM

## 2020-12-01 DIAGNOSIS — I10 ESSENTIAL HYPERTENSION: ICD-10-CM

## 2020-12-01 DIAGNOSIS — F51.01 PRIMARY INSOMNIA: ICD-10-CM

## 2020-12-01 DIAGNOSIS — K21.9 GASTROESOPHAGEAL REFLUX DISEASE WITHOUT ESOPHAGITIS: ICD-10-CM

## 2020-12-01 DIAGNOSIS — R53.83 FATIGUE, UNSPECIFIED TYPE: ICD-10-CM

## 2020-12-01 DIAGNOSIS — Z11.59 ENCOUNTER FOR HEPATITIS C SCREENING TEST FOR LOW RISK PATIENT: ICD-10-CM

## 2020-12-01 DIAGNOSIS — Z23 NEED FOR PNEUMOCOCCAL VACCINATION: ICD-10-CM

## 2020-12-01 DIAGNOSIS — M79.7 FIBROMYALGIA: ICD-10-CM

## 2020-12-01 DIAGNOSIS — M54.16 LUMBAR BACK PAIN WITH RADICULOPATHY AFFECTING RIGHT LOWER EXTREMITY: ICD-10-CM

## 2020-12-01 DIAGNOSIS — E78.2 MIXED HYPERLIPIDEMIA: ICD-10-CM

## 2020-12-01 DIAGNOSIS — M79.10 MYALGIA: ICD-10-CM

## 2020-12-01 DIAGNOSIS — Z12.31 ENCOUNTER FOR SCREENING MAMMOGRAM FOR BREAST CANCER: ICD-10-CM

## 2020-12-01 DIAGNOSIS — Z00.00 ANNUAL PHYSICAL EXAM: Primary | ICD-10-CM

## 2020-12-01 DIAGNOSIS — F41.9 ANXIETY: ICD-10-CM

## 2020-12-01 PROCEDURE — 90732 PPSV23 VACC 2 YRS+ SUBQ/IM: CPT | Performed by: FAMILY MEDICINE

## 2020-12-01 PROCEDURE — 99396 PREV VISIT EST AGE 40-64: CPT | Performed by: FAMILY MEDICINE

## 2020-12-01 PROCEDURE — 90471 IMMUNIZATION ADMIN: CPT | Performed by: FAMILY MEDICINE

## 2020-12-01 PROCEDURE — 99214 OFFICE O/P EST MOD 30 MIN: CPT | Performed by: FAMILY MEDICINE

## 2020-12-01 RX ORDER — TRAZODONE HYDROCHLORIDE 50 MG/1
50 TABLET ORAL NIGHTLY
Qty: 30 TABLET | Refills: 2 | Status: SHIPPED | OUTPATIENT
Start: 2020-12-01 | End: 2020-12-10 | Stop reason: SDUPTHER

## 2020-12-01 RX ORDER — LOSARTAN POTASSIUM 100 MG/1
100 TABLET ORAL DAILY
Qty: 90 TABLET | Refills: 3 | Status: SHIPPED | OUTPATIENT
Start: 2020-12-01 | End: 2020-12-10 | Stop reason: SDUPTHER

## 2020-12-01 RX ORDER — MELOXICAM 15 MG/1
15 TABLET ORAL DAILY
Qty: 90 TABLET | Refills: 3 | Status: SHIPPED | OUTPATIENT
Start: 2020-12-01 | End: 2020-12-10 | Stop reason: SDUPTHER

## 2020-12-01 RX ORDER — OMEPRAZOLE 40 MG/1
40 CAPSULE, DELAYED RELEASE ORAL DAILY
Qty: 90 CAPSULE | Refills: 3 | Status: SHIPPED | OUTPATIENT
Start: 2020-12-01 | End: 2020-12-10 | Stop reason: SDUPTHER

## 2020-12-01 RX ORDER — HYDROXYZINE PAMOATE 25 MG/1
25 CAPSULE ORAL 3 TIMES DAILY PRN
Qty: 30 CAPSULE | Refills: 1 | Status: SHIPPED | OUTPATIENT
Start: 2020-12-01 | End: 2022-05-16 | Stop reason: SDUPTHER

## 2020-12-01 RX ORDER — GABAPENTIN 800 MG/1
TABLET ORAL
Qty: 360 TABLET | Refills: 1 | Status: SHIPPED | OUTPATIENT
Start: 2020-12-01 | End: 2020-12-10 | Stop reason: SDUPTHER

## 2020-12-01 NOTE — PROGRESS NOTES
Chief Complaint   Patient presents with   • Hypertension       Subjective     Amy L Sturgeon is a 50 y.o. female.  She is returning for the first time in just over a year to follow up on HTN,  hyperlipidemia, and other chronic medical problems as well as wanting a preventative health visit and new concern of muscle spasms.    Last Pap 7/26/2019, negative.  Hysterectomy in 2005  Mammogram 7/31/2019 BI-RADS 1  DEXA scan 11/1/2019, normal  Colonoscopy January 26, 2012, Dr. Bronson, plastic biliary stent found in cecum, otherwise negative, repeat in 10 years.    Reporting frequent severe muscle spasms in her upper inner leg.  She does have a history of low back pain with right-sided sciatica, have seen specialist for this in the past and has had epidural injections. Have not had insurance for past year. Have been approved for medicare. And states also obtained private insurance. In a lot of medical debt, do want preventitive care but do not want to get x-rays MRI's or see specialists for back until other bills have been paid.   Have reduced gabapentin to 800 mg bid to prevent running out, but previously had taken qid with better benefit for back pain.   Have been out of Feldene for over year ago.   Requesting refills on multiple medications.   Also needs mammogram order.  Patient states to check B/P at home, no readings with her.  States are typically within normal range.  Scheduled for MRI on 12/18 for follow up on acoustic neuroma.     She does report since  was diagnosed with diabetes the whole family has changed dietary habits and he has been.    The following portions of the patient's history were reviewed and updated as appropriate: allergies, current medications, past family history, past medical history, past social history, past surgical history and problem list.    Current Outpatient Medications on File Prior to Visit   Medication Sig   • amLODIPine (NORVASC) 2.5 MG tablet Take 1 tablet by mouth  Daily.   • atorvastatin (LIPITOR) 20 MG tablet Take 1 tablet by mouth Every Night.   • cycloSPORINE (RESTASIS) 0.05 % ophthalmic emulsion Apply 1 drop to eye(s) as directed by provider 2 (Two) Times a Day.   • DULoxetine (CYMBALTA) 60 MG capsule Take 1 capsule by mouth Daily.   • fluticasone (FLONASE) 50 MCG/ACT nasal spray 2 sprays into the nostril(s) as directed by provider Daily.   • ipratropium-albuterol (DUO-NEB) 0.5-2.5 mg/3 ml nebulizer Take 3 mL by nebulization Every 4 (Four) Hours As Needed for Wheezing.   • meclizine (ANTIVERT) 12.5 MG tablet Take 1 tablet by mouth 3 (Three) Times a Day As Needed.   • MYRBETRIQ 50 MG tablet sustained-release 24 hour 24 hr tablet Take 1 tablet by mouth Daily.   • polyethylene glycol (MIRALAX) powder Take 9 g by mouth Daily.   • VENTOLIN  (90 Base) MCG/ACT inhaler USE 2 PUFFS BY MOUTH FOUR TIMES A DAY AS NEEDED   • cyclobenzaprine (FLEXERIL) 10 MG tablet Take 10 mg by mouth 3 (Three) Times a Day As Needed for Muscle Spasms.   • Fluticasone Furoate-Vilanterol (BREO ELLIPTA) 200-25 MCG/INH inhaler Inhale 1 puff Daily.     No current facility-administered medications on file prior to visit.      Medications Discontinued During This Encounter   Medication Reason   • fluconazole (DIFLUCAN) 200 MG tablet *Therapy completed   • loratadine (CLARITIN) 10 MG tablet *Therapy completed   • hydrOXYzine pamoate (VISTARIL) 25 MG capsule Reorder   • traZODone (DESYREL) 50 MG tablet Reorder   • gabapentin (NEURONTIN) 800 MG tablet Reorder   • losartan (COZAAR) 100 MG tablet Reorder   • omeprazole (priLOSEC) 40 MG capsule Reorder   • piroxicam (FELDENE) 20 MG capsule Alternate therapy       Review of Systems   Constitutional: Negative for fatigue and fever.   HENT: Positive for hearing loss.    Respiratory: Negative for cough, shortness of breath and wheezing.    Cardiovascular: Negative for palpitations and leg swelling. Chest pain:     Gastrointestinal: Negative for abdominal pain  "and diarrhea.   Genitourinary: Positive for breast pain. Negative for breast discharge.   Musculoskeletal: Positive for back pain and myalgias.   Skin: Negative for rash.   Neurological: Positive for dizziness and numbness.   Psychiatric/Behavioral: Negative for depressed mood. The patient is nervous/anxious.         Objective   Vitals:    12/01/20 1300   BP: 128/90   BP Location: Right arm   Patient Position: Sitting   Cuff Size: Adult   Pulse: 76   Temp: 97.3 °F (36.3 °C)   TempSrc: Infrared   SpO2: 100%   Weight: 110 kg (243 lb)   Height: 165.1 cm (65\")      Body mass index is 40.44 kg/m².    Physical Exam  Constitutional:       General: She is not in acute distress.     Appearance: She is well-developed. She is obese.   HENT:      Head: Normocephalic and atraumatic.      Right Ear: External ear normal.      Left Ear: External ear normal.   Eyes:      Conjunctiva/sclera: Conjunctivae normal.      Pupils: Pupils are equal, round, and reactive to light.   Neck:      Musculoskeletal: Normal range of motion and neck supple.      Thyroid: No thyromegaly.      Trachea: No tracheal deviation.   Cardiovascular:      Rate and Rhythm: Normal rate and regular rhythm.      Heart sounds: No murmur. No friction rub. No gallop.    Pulmonary:      Effort: Pulmonary effort is normal.      Breath sounds: Normal breath sounds. No wheezing or rales.   Abdominal:      General: Bowel sounds are normal.      Palpations: Abdomen is soft.      Tenderness: There is no abdominal tenderness.   Musculoskeletal: Normal range of motion.      Comments: Some tenderness palpation of midline lumbosacral back and right SI joint area.  There is normal range of motion of both hips there is no tenderness to palpation or palpable cords in the medial thigh   Lymphadenopathy:      Cervical: No cervical adenopathy.   Skin:     General: Skin is warm and dry.      Findings: No rash.   Neurological:      Mental Status: She is alert and oriented to person, " place, and time.   Psychiatric:         Mood and Affect: Mood normal.         Behavior: Behavior normal.         Thought Content: Thought content normal.       Patient declines breast exam, scheduling for mammogram.    Lab Results   Component Value Date     (H) 12/05/2020    BUN 16 12/05/2020    CREATININE 0.91 12/05/2020    EGFRIFNONA 74 12/05/2020    EGFRIFAFRI 86 12/05/2020     12/05/2020    K 4.2 12/05/2020     12/05/2020    CALCIUM 10.0 12/05/2020    ALBUMIN 4.4 12/05/2020    BILITOT 0.4 12/05/2020    ALKPHOS 131 (H) 12/05/2020    AST 14 12/05/2020    ALT 16 12/05/2020    CHLPL 161 12/05/2020    TRIG 82 12/05/2020    HDL 67 12/05/2020    VLDL 15 12/05/2020    LDL 79 12/05/2020    CKTOTAL 81 12/05/2020    WBC 11.5 (H) 12/05/2020    RBC 5.09 12/05/2020    HCT 43.9 12/05/2020    MCV 86 12/05/2020    MCH 29.3 12/05/2020    TSH 2.020 12/05/2020      Lab Results   Component Value Date    HGBA1C 6.0 (H) 07/23/2019        Procedures     Assessment/Plan   Diagnoses and all orders for this visit:    1. Annual physical exam (Primary)    2. Encounter for screening mammogram for breast cancer  -     Mammo Screening Digital Tomosynthesis Bilateral With CAD; Future    3. Need for pneumococcal vaccination  -     Pneumococcal Polysaccharide Vaccine 23-Valent Greater Than or Equal To 1yo Subcutaneous / IM    4. Encounter for hepatitis C screening test for low risk patient  -     Hepatitis C Antibody; Future    5. Essential hypertension  -     losartan (COZAAR) 100 MG tablet; Take 1 tablet by mouth Daily.  Dispense: 90 tablet; Refill: 3  -     Comprehensive Metabolic Panel; Future    6. Mixed hyperlipidemia  -     Lipid Panel; Future  -     Comprehensive Metabolic Panel; Future    7. Fibromyalgia    8. Recurrent major depressive disorder, in partial remission (CMS/HCC)  -     traZODone (DESYREL) 50 MG tablet; Take 1 tablet by mouth Every Night.  Dispense: 30 tablet; Refill: 2    9. Myalgia  -     CK; Future  -      meloxicam (MOBIC) 15 MG tablet; Take 1 tablet by mouth Daily.  Dispense: 90 tablet; Refill: 3    10. Primary insomnia  -     traZODone (DESYREL) 50 MG tablet; Take 1 tablet by mouth Every Night.  Dispense: 30 tablet; Refill: 2    11. Lumbar back pain with radiculopathy affecting right lower extremity  -     gabapentin (NEURONTIN) 800 MG tablet; 1 tab tid, increase to qid if needed  Dispense: 360 tablet; Refill: 1  -     meloxicam (MOBIC) 15 MG tablet; Take 1 tablet by mouth Daily.  Dispense: 90 tablet; Refill: 3    12. Fatigue, unspecified type  -     TSH; Future  -     CBC & Differential; Future    13. Gastroesophageal reflux disease without esophagitis  -     omeprazole (priLOSEC) 40 MG capsule; Take 1 capsule by mouth Daily.  Dispense: 90 capsule; Refill: 3    14. Anxiety  -     hydrOXYzine pamoate (VISTARIL) 25 MG capsule; Take 1 capsule by mouth 3 (Three) Times a Day As Needed for Anxiety.  Dispense: 30 capsule; Refill: 1        Preventative health care, ordering mammogram, up-to-date on DEXA scan and colonoscopy, pneumococcal today and screening for hepatitis C antibody    Hypertension reports normal readings at home advised to follow a DASH diet and continue efforts at weight reduction continue current medications    Myalgias, possibly secondary to lumbar degenerative changes/sciatica, did advised to stretch regularly, remain hydrated, and if becomes more problematic will need further evaluation.  Increasing Neurontin to 800 mg 3 times daily and up to previous level of 4 times daily if needed, previous dosage.  Started meloxicam, possible safer alternative than Feldene for daily use.    Other chronic medical and psychiatric conditions stable, renewing medications as requested      Medications Discontinued During This Encounter   Medication Reason   • fluconazole (DIFLUCAN) 200 MG tablet *Therapy completed   • loratadine (CLARITIN) 10 MG tablet *Therapy completed   • hydrOXYzine pamoate (VISTARIL) 25 MG  capsule Reorder   • traZODone (DESYREL) 50 MG tablet Reorder   • gabapentin (NEURONTIN) 800 MG tablet Reorder   • losartan (COZAAR) 100 MG tablet Reorder   • omeprazole (priLOSEC) 40 MG capsule Reorder   • piroxicam (FELDENE) 20 MG capsule Alternate therapy          Return in about 6 months (around 6/1/2021) for Recheck, htn or as needed increase in low back pain or paresthesias or other concerns.    Education reference material relevant to visit available in AVS through [employer] or printed copy given.  Answers for HPI/ROS submitted by the patient on 11/29/2020   What is the primary reason for your visit?: Other  Please describe your symptoms.: Check up for prescriptions  Have you had these symptoms before?: Yes  How long have you been having these symptoms?: Greater than 2 weeks

## 2020-12-06 LAB
ALBUMIN SERPL-MCNC: 4.4 G/DL (ref 3.8–4.8)
ALBUMIN/GLOB SERPL: 1.6 {RATIO} (ref 1.2–2.2)
ALP SERPL-CCNC: 131 IU/L (ref 39–117)
ALT SERPL-CCNC: 16 IU/L (ref 0–32)
AST SERPL-CCNC: 14 IU/L (ref 0–40)
BASOPHILS # BLD AUTO: 0.1 X10E3/UL (ref 0–0.2)
BASOPHILS NFR BLD AUTO: 1 %
BILIRUB SERPL-MCNC: 0.4 MG/DL (ref 0–1.2)
BUN SERPL-MCNC: 16 MG/DL (ref 6–24)
BUN/CREAT SERPL: 18 (ref 9–23)
CALCIUM SERPL-MCNC: 10 MG/DL (ref 8.7–10.2)
CHLORIDE SERPL-SCNC: 102 MMOL/L (ref 96–106)
CHOLEST SERPL-MCNC: 161 MG/DL (ref 100–199)
CK SERPL-CCNC: 81 U/L (ref 32–182)
CO2 SERPL-SCNC: 22 MMOL/L (ref 20–29)
CREAT SERPL-MCNC: 0.91 MG/DL (ref 0.57–1)
EOSINOPHIL # BLD AUTO: 0 X10E3/UL (ref 0–0.4)
EOSINOPHIL NFR BLD AUTO: 0 %
ERYTHROCYTE [DISTWIDTH] IN BLOOD BY AUTOMATED COUNT: 13.3 % (ref 11.7–15.4)
GLOBULIN SER CALC-MCNC: 2.8 G/DL (ref 1.5–4.5)
GLUCOSE SERPL-MCNC: 102 MG/DL (ref 65–99)
HCT VFR BLD AUTO: 43.9 % (ref 34–46.6)
HCV AB S/CO SERPL IA: <0.1 S/CO RATIO (ref 0–0.9)
HDLC SERPL-MCNC: 67 MG/DL
HGB BLD-MCNC: 14.9 G/DL (ref 11.1–15.9)
IMM GRANULOCYTES # BLD AUTO: 0.1 X10E3/UL (ref 0–0.1)
IMM GRANULOCYTES NFR BLD AUTO: 1 %
LDLC SERPL CALC-MCNC: 79 MG/DL (ref 0–99)
LYMPHOCYTES # BLD AUTO: 2.7 X10E3/UL (ref 0.7–3.1)
LYMPHOCYTES NFR BLD AUTO: 23 %
MCH RBC QN AUTO: 29.3 PG (ref 26.6–33)
MCHC RBC AUTO-ENTMCNC: 33.9 G/DL (ref 31.5–35.7)
MCV RBC AUTO: 86 FL (ref 79–97)
MONOCYTES # BLD AUTO: 0.9 X10E3/UL (ref 0.1–0.9)
MONOCYTES NFR BLD AUTO: 7 %
NEUTROPHILS # BLD AUTO: 7.8 X10E3/UL (ref 1.4–7)
NEUTROPHILS NFR BLD AUTO: 68 %
PLATELET # BLD AUTO: 310 X10E3/UL (ref 150–450)
POTASSIUM SERPL-SCNC: 4.2 MMOL/L (ref 3.5–5.2)
PROT SERPL-MCNC: 7.2 G/DL (ref 6–8.5)
RBC # BLD AUTO: 5.09 X10E6/UL (ref 3.77–5.28)
SODIUM SERPL-SCNC: 141 MMOL/L (ref 134–144)
TRIGL SERPL-MCNC: 82 MG/DL (ref 0–149)
TSH SERPL DL<=0.005 MIU/L-ACNC: 2.02 UIU/ML (ref 0.45–4.5)
VLDLC SERPL CALC-MCNC: 15 MG/DL (ref 5–40)
WBC # BLD AUTO: 11.5 X10E3/UL (ref 3.4–10.8)

## 2020-12-08 DIAGNOSIS — Z12.31 ENCOUNTER FOR SCREENING MAMMOGRAM FOR BREAST CANCER: ICD-10-CM

## 2020-12-10 DIAGNOSIS — I10 ESSENTIAL HYPERTENSION: ICD-10-CM

## 2020-12-10 DIAGNOSIS — K21.9 GASTROESOPHAGEAL REFLUX DISEASE WITHOUT ESOPHAGITIS: ICD-10-CM

## 2020-12-10 DIAGNOSIS — F51.01 PRIMARY INSOMNIA: ICD-10-CM

## 2020-12-10 DIAGNOSIS — M54.16 LUMBAR BACK PAIN WITH RADICULOPATHY AFFECTING RIGHT LOWER EXTREMITY: ICD-10-CM

## 2020-12-10 DIAGNOSIS — M79.10 MYALGIA: ICD-10-CM

## 2020-12-10 DIAGNOSIS — F33.41 RECURRENT MAJOR DEPRESSIVE DISORDER, IN PARTIAL REMISSION (HCC): ICD-10-CM

## 2020-12-10 RX ORDER — MELOXICAM 15 MG/1
15 TABLET ORAL DAILY
Qty: 90 TABLET | Refills: 3 | Status: SHIPPED | OUTPATIENT
Start: 2020-12-10 | End: 2021-10-04

## 2020-12-10 RX ORDER — LOSARTAN POTASSIUM 100 MG/1
100 TABLET ORAL DAILY
Qty: 90 TABLET | Refills: 3 | Status: SHIPPED | OUTPATIENT
Start: 2020-12-10 | End: 2021-09-27

## 2020-12-10 RX ORDER — DULOXETIN HYDROCHLORIDE 60 MG/1
60 CAPSULE, DELAYED RELEASE ORAL DAILY
Qty: 90 CAPSULE | Refills: 3 | Status: SHIPPED | OUTPATIENT
Start: 2020-12-10 | End: 2021-06-23 | Stop reason: SDUPTHER

## 2020-12-10 RX ORDER — AMLODIPINE BESYLATE 2.5 MG/1
2.5 TABLET ORAL DAILY
Qty: 90 TABLET | Refills: 3 | Status: SHIPPED | OUTPATIENT
Start: 2020-12-10 | End: 2021-06-23

## 2020-12-10 RX ORDER — TRAZODONE HYDROCHLORIDE 50 MG/1
50 TABLET ORAL NIGHTLY
Qty: 90 TABLET | Refills: 3 | Status: SHIPPED | OUTPATIENT
Start: 2020-12-10 | End: 2020-12-16 | Stop reason: SDUPTHER

## 2020-12-10 RX ORDER — GABAPENTIN 800 MG/1
TABLET ORAL
Qty: 360 TABLET | Refills: 1 | Status: SHIPPED | OUTPATIENT
Start: 2020-12-10 | End: 2021-04-05 | Stop reason: SDUPTHER

## 2020-12-10 RX ORDER — OMEPRAZOLE 40 MG/1
40 CAPSULE, DELAYED RELEASE ORAL DAILY
Qty: 90 CAPSULE | Refills: 3 | Status: SHIPPED | OUTPATIENT
Start: 2020-12-10 | End: 2021-10-04

## 2020-12-10 RX ORDER — ATORVASTATIN CALCIUM 20 MG/1
20 TABLET, FILM COATED ORAL NIGHTLY
Qty: 90 TABLET | Refills: 3 | Status: SHIPPED | OUTPATIENT
Start: 2020-12-10 | End: 2021-06-23 | Stop reason: SDUPTHER

## 2020-12-10 NOTE — TELEPHONE ENCOUNTER
Attempting to contact patient to see where these need sent, no mail order pharmacy in chart and what medications she needs sent there, no answer, LMOM

## 2020-12-10 NOTE — TELEPHONE ENCOUNTER
Patient states her medications are supposed to go to the mail order not [facility] pharmacy. Patient states that [facility] was supposed to send a request for her medications she needs renewed. Wanting to know if we received it and needing these scripts sent in soon because she is running low and mail order will have to process meds and the mail order is free. Patient asking if we have not received the fax if we can call [facility] and see what meds it is she is needing sent there. Patient would like a call back on status. Thanks

## 2020-12-16 DIAGNOSIS — F51.01 PRIMARY INSOMNIA: ICD-10-CM

## 2020-12-16 DIAGNOSIS — F33.41 RECURRENT MAJOR DEPRESSIVE DISORDER, IN PARTIAL REMISSION (HCC): ICD-10-CM

## 2020-12-16 RX ORDER — TRAZODONE HYDROCHLORIDE 50 MG/1
50 TABLET ORAL NIGHTLY
Qty: 90 TABLET | Refills: 3 | Status: SHIPPED | OUTPATIENT
Start: 2020-12-16 | End: 2021-11-30 | Stop reason: SDUPTHER

## 2020-12-21 DIAGNOSIS — R05.3 COUGH, PERSISTENT: Primary | ICD-10-CM

## 2020-12-23 RX ORDER — CIPROFLOXACIN 500 MG/1
500 TABLET, FILM COATED ORAL 2 TIMES DAILY
Qty: 10 TABLET | Refills: 0 | Status: SHIPPED | OUTPATIENT
Start: 2020-12-23 | End: 2020-12-23 | Stop reason: SDUPTHER

## 2020-12-23 RX ORDER — CIPROFLOXACIN 500 MG/1
500 TABLET, FILM COATED ORAL 2 TIMES DAILY
Qty: 10 TABLET | Refills: 0 | Status: SHIPPED | OUTPATIENT
Start: 2020-12-23 | End: 2020-12-28

## 2021-01-19 ENCOUNTER — OFFICE VISIT (OUTPATIENT)
Dept: PULMONOLOGY | Facility: HOSPITAL | Age: 51
End: 2021-01-19

## 2021-01-19 VITALS
DIASTOLIC BLOOD PRESSURE: 83 MMHG | TEMPERATURE: 97.1 F | OXYGEN SATURATION: 99 % | SYSTOLIC BLOOD PRESSURE: 118 MMHG | WEIGHT: 252 LBS | BODY MASS INDEX: 41.99 KG/M2 | HEIGHT: 65 IN | HEART RATE: 77 BPM | RESPIRATION RATE: 16 BRPM

## 2021-01-19 DIAGNOSIS — J45.41 MODERATE PERSISTENT ASTHMA WITH ACUTE EXACERBATION: Primary | ICD-10-CM

## 2021-01-19 DIAGNOSIS — R53.83 FATIGUE, UNSPECIFIED TYPE: ICD-10-CM

## 2021-01-19 PROCEDURE — G0463 HOSPITAL OUTPT CLINIC VISIT: HCPCS

## 2021-01-19 RX ORDER — CETIRIZINE HYDROCHLORIDE 10 MG/1
10 TABLET ORAL EVERY MORNING
COMMUNITY
End: 2021-08-30

## 2021-01-19 NOTE — PROGRESS NOTES
PULMONARY  CONSULT NOTE      PATIENT IDENTIFICATION:  Name: Amy L Sturgeon  Age: 50 y.o.  Sex: female  :  1970  MRN: [id number]    DATE OF CONSULTATION:  2021                     CHIEF COMPLAINT: Shortness of breath    History of Present Illness:   Amy L Sturgeon is a 50 y.o. female reported to me that for years she has been having asthma and allergy and shortness of breath, she was treated with allergy shots and different inhalers she continues have dyspnea on exertion, also over the last few months she is having more like coughing up mucus from the back of her throat and giving her bad smells more in the morning hours  Patient still feeling tired weeks she was on CPAP 15 years ago and repeated sleep study showed the patient still have severe sleep apnea and because she is not tolerating the pressure from the old machine she is not able to use it    Review of Systems:   Constitutional:  As above   Eyes: negative   ENT/oropharynx: negative   Cardiovascular: negative   Respiratory:  As above   Gastrointestinal: negative   Genitourinary: negative   Neurological: negative   Musculoskeletal: negative   Integument/breast: negative   Endocrine: negative   Allergic/Immunologic: negative     Past Medical History:  Past Medical History:   Diagnosis Date   • Allergic    • Anxiety    • Asthma    • Brain tumor (benign) (CMS/HCC)    • Closed tibial fracture 2019    right    • GERD (gastroesophageal reflux disease)    • H/O bone density study 2008   • H/O mammogram 11/15/2012   • H/O mammogram 2018   • History of EKG 2012   • Hyperlipidemia    • Hypertension    • Pap smear for cervical cancer screening 08/15/2011   • Sleep apnea        Past Surgical History:  Past Surgical History:   Procedure Laterality Date   • CARDIOVASCULAR STRESS TEST  2012   • CHOLECYSTECTOMY  2006   • COLONOSCOPY  2008   • DILATION AND CURETTAGE, DIAGNOSTIC / THERAPEUTIC     • HYSTERECTOMY     •  LAPAROSCOPIC TUBAL LIGATION          Family History:  Family History   Problem Relation Age of Onset   • Hypertension Mother    • Breast cancer Mother    • Breast cancer Maternal Grandmother    • Heart disease Maternal Grandmother    • Heart disease Paternal Grandfather    • Heart disease Father    • Diabetes Brother         Social History:   Social History     Tobacco Use   • Smoking status: Former Smoker     Types: Cigarettes     Quit date:      Years since quittin.0   • Smokeless tobacco: Never Used   Substance Use Topics   • Alcohol use: Yes     Alcohol/week: 1.0 standard drinks     Types: 1 Glasses of wine per week     Frequency: 2-4 times a month     Comment: Social        Allergies:  Allergies   Allergen Reactions   • Iodinated Diagnostic Agents Hives       Home Meds:  (Not in a hospital admission)      Objective:    Vitals Ranges:   Temp:  [97.1 °F (36.2 °C)] 97.1 °F (36.2 °C)  Heart Rate:  [77] 77  Resp:  [16] 16  BP: (118)/(83) 118/83  Body mass index is 41.93 kg/m².     Exam:  General Appearance:  WDWN    HEENT:   without obvious abnormality,  Conjunctiva/corneas clear,  Normal external ear canals, no drainage    Clear orsalmucosa,  Mallampati score 3    Neck:  Supple, symmetrical, trachea midline. No JVD.  Lungs:   Bilateral basal rhonchi bilaterally, respirations unlabored symmetrical wall movement.    Chest wall:  No tenderness or deformity.    Heart:  Regular rate and rhythm, S1 and S2 normal.  Extremities: Trace edema no clubbing or Cyanosis        Data Review:  All labs (24hrs): No results found for this or any previous visit (from the past 24 hour(s)).     Imaging:  EMG & Nerve Conduction Test  Ayad Sainz MD     10/5/2017  5:50 PM  EMG and Nerve Conduction Studies    Please see data sheets for details on methods, temperatures and   lab standards. EMG muscles tested for upper extremity studies   include the deltoid, biceps, triceps, pronator teres, extensor   digitorum communis,  first dorsal interosseous and abductor   pollicis brevis.  EMG muscles tested for lower extremity studies   include the vastus lateralis, tibialis anterior, peroneus longus,   medial gastrocnemius and extensor digitorum brevis.  Additional   muscles tested as needed.  Paraspinal muscles tested as needed.    The complete report includes the data sheets.    Indication: Right lumbosacral radiculopathy  History: 47-year-old woman who earlier this summer was skimming   the pool and developed low back pain with radiating pain down the   right leg which wraps around from lateral to medial knee.    Symptoms have improved some over time.  She indicates an MRI of   her lumbar spine has shown disc bulging with some root   impingement.    Ht: Not recorded  Wt: Not recorded  HbA1C: No results found for: HGBA1C  TSH: No results found for: TSH    Technical summary:  Nerve conduction studies were obtained in the right leg.  Skin   temperature was a little cool at about 31.5°C measured at the   ankle.  Needle examination was obtained on selected muscles of   the right leg.    Results:  1.  Normal right sural sensory study.  2.  Normal right superficial peroneal sensory study.  3.  Normal right peroneal motor study.  4.  Normal right tibial motor study.  5.  Needle examination of selected muscles of the right leg   showed some increased insertional activity in the medial   gastrocnemius however there was also incomplete relaxation.  No   clear-cut fibrillations or positive sharp waves were seen   however.  The voluntary motor units were normal size and   configuration and the interference pattern was essentially full.    Remaining muscles tested showed normal insertional activities,   motor units and recruitment patterns    Impression:  Essentially normal study.  There were normal nerve conductions.    The needle examination showed some irritability of the medial   gastrocnemius but definitive evidence of acute denervation was   not  seen.  Clinical correlation is suggested.  Study results were   discussed with the patient.    Ayad Sainz M.D.    Dictated utilizing Dragon dictation.        ASSESSMENT:  Diagnoses and all orders for this visit:    Moderate persistent asthma with acute exacerbation    Fatigue, unspecified type    Other orders  -     cetirizine (zyrTEC) 10 MG tablet; Take 10 mg by mouth Every Morning.        PLAN:   This is patient with symptoms of obstructive sleep apnea, titration study because patient did not tolerate auto CPAP, Avoid supine avoid sedative meds in pm, weight loss, Avoid driving. Long discussion with patient about the physiology of MARY CARMEN, and long term and short term   benefit of treating MARY CARMEN     Patient with recurrent shortness of breath coughing component arrange for this chest x-ray, and PFT, currently patient of any inhalers or keep following her clinically till get the results    Alex Mahoney MD. D, ABSM.  1/19/2021  15:00 EST

## 2021-01-22 ENCOUNTER — TRANSCRIBE ORDERS (OUTPATIENT)
Dept: PULMONOLOGY | Facility: HOSPITAL | Age: 51
End: 2021-01-22

## 2021-01-22 DIAGNOSIS — Z01.818 OTHER SPECIFIED PRE-OPERATIVE EXAMINATION: Primary | ICD-10-CM

## 2021-02-12 ENCOUNTER — TRANSCRIBE ORDERS (OUTPATIENT)
Dept: RESPIRATORY THERAPY | Facility: HOSPITAL | Age: 51
End: 2021-02-12

## 2021-02-12 DIAGNOSIS — Z01.818 OTHER SPECIFIED PRE-OPERATIVE EXAMINATION: Primary | ICD-10-CM

## 2021-02-15 ENCOUNTER — APPOINTMENT (OUTPATIENT)
Dept: LAB | Facility: HOSPITAL | Age: 51
End: 2021-02-15

## 2021-02-17 ENCOUNTER — APPOINTMENT (OUTPATIENT)
Dept: RESPIRATORY THERAPY | Facility: HOSPITAL | Age: 51
End: 2021-02-17

## 2021-03-10 ENCOUNTER — HOSPITAL ENCOUNTER (OUTPATIENT)
Dept: GENERAL RADIOLOGY | Facility: HOSPITAL | Age: 51
Discharge: HOME | End: 2021-03-10

## 2021-03-10 ENCOUNTER — LAB (OUTPATIENT)
Dept: LAB | Facility: HOSPITAL | Age: 51
End: 2021-03-10

## 2021-03-10 DIAGNOSIS — J45.41 MODERATE PERSISTENT ASTHMA WITH ACUTE EXACERBATION: ICD-10-CM

## 2021-03-10 DIAGNOSIS — Z01.818 OTHER SPECIFIED PRE-OPERATIVE EXAMINATION: ICD-10-CM

## 2021-03-10 PROCEDURE — 71046 X-RAY EXAM CHEST 2 VIEWS: CPT

## 2021-03-10 PROCEDURE — C9803 HOPD COVID-19 SPEC COLLECT: HCPCS

## 2021-03-10 PROCEDURE — U0004 COV-19 TEST NON-CDC HGH THRU: HCPCS

## 2021-03-11 LAB — SARS-COV-2 ORF1AB RESP QL NAA+PROBE: NOT DETECTED

## 2021-03-12 ENCOUNTER — HOSPITAL ENCOUNTER (OUTPATIENT)
Dept: SLEEP MEDICINE | Facility: HOSPITAL | Age: 51
Discharge: HOME | End: 2021-03-12
Admitting: INTERNAL MEDICINE

## 2021-03-12 VITALS — WEIGHT: 251.32 LBS | HEIGHT: 65 IN | BODY MASS INDEX: 41.87 KG/M2

## 2021-03-12 DIAGNOSIS — J45.41 MODERATE PERSISTENT ASTHMA WITH ACUTE EXACERBATION: ICD-10-CM

## 2021-03-12 PROCEDURE — 95811 POLYSOM 6/>YRS CPAP 4/> PARM: CPT

## 2021-03-13 ENCOUNTER — HOSPITAL ENCOUNTER (OUTPATIENT)
Dept: RESPIRATORY THERAPY | Facility: HOSPITAL | Age: 51
Discharge: HOME | End: 2021-03-13
Admitting: INTERNAL MEDICINE

## 2021-03-13 DIAGNOSIS — J45.41 MODERATE PERSISTENT ASTHMA WITH ACUTE EXACERBATION: ICD-10-CM

## 2021-03-13 PROCEDURE — 94729 DIFFUSING CAPACITY: CPT

## 2021-03-13 PROCEDURE — 94726 PLETHYSMOGRAPHY LUNG VOLUMES: CPT

## 2021-03-13 PROCEDURE — 94060 EVALUATION OF WHEEZING: CPT

## 2021-03-13 RX ORDER — ALBUTEROL SULFATE 90 UG/1
2 AEROSOL, METERED RESPIRATORY (INHALATION) ONCE
Status: COMPLETED | OUTPATIENT
Start: 2021-03-13 | End: 2021-03-13

## 2021-03-13 RX ADMIN — ALBUTEROL SULFATE 2 PUFF: 108 AEROSOL, METERED RESPIRATORY (INHALATION) at 07:31

## 2021-04-04 DIAGNOSIS — M54.16 LUMBAR BACK PAIN WITH RADICULOPATHY AFFECTING RIGHT LOWER EXTREMITY: ICD-10-CM

## 2021-04-05 RX ORDER — GABAPENTIN 800 MG/1
TABLET ORAL
Qty: 360 TABLET | Refills: 3 | Status: SHIPPED | OUTPATIENT
Start: 2021-04-05 | End: 2021-09-01

## 2021-04-13 ENCOUNTER — OFFICE VISIT (OUTPATIENT)
Dept: PULMONOLOGY | Facility: HOSPITAL | Age: 51
End: 2021-04-13

## 2021-04-13 DIAGNOSIS — K21.9 GASTROESOPHAGEAL REFLUX DISEASE WITHOUT ESOPHAGITIS: ICD-10-CM

## 2021-04-13 DIAGNOSIS — J45.41 MODERATE PERSISTENT ASTHMA WITH ACUTE EXACERBATION: Primary | ICD-10-CM

## 2021-04-13 DIAGNOSIS — G47.33 OBSTRUCTIVE SLEEP APNEA: ICD-10-CM

## 2021-04-13 PROCEDURE — G0463 HOSPITAL OUTPT CLINIC VISIT: HCPCS

## 2021-04-13 RX ORDER — ALBUTEROL SULFATE 90 UG/1
2 AEROSOL, METERED RESPIRATORY (INHALATION) EVERY 4 HOURS PRN
Qty: 6.7 G | Refills: 5 | Status: SHIPPED | OUTPATIENT
Start: 2021-04-13 | End: 2022-05-16 | Stop reason: SDUPTHER

## 2021-04-13 NOTE — PROGRESS NOTES
PULMONARY  CONSULT NOTE      PATIENT IDENTIFICATION:  Name: Amy L Sturgeon  Age: 50 y.o.  Sex: female  :  1970  MRN: [id number]    DATE OF CONSULTATION:  2021                     CHIEF COMPLAINT: Obstructive sleep apnea    History of Present Illness:   Amy L Sturgeon is a 50 y.o. female with obstructive sleep apnea symptoms did have a sleep study was done on 3/12/2021 showed the patient has obstructive sleep apnea, and it showed the patient is requiring BiPAP 19/15,  Patient has underlying asthma she is off her medication continue the insurance she is using only rescue inhaler which , still having significant shortness of breath wheezing and coughing  The results of the sleep study, PFT and chest x-ray reviewed with the patient    Review of Systems:   Constitutional:  As above   Eyes: negative   ENT/oropharynx: negative   Cardiovascular: negative   Respiratory: negative   Gastrointestinal: negative   Genitourinary: negative   Neurological: negative   Musculoskeletal: negative   Integument/breast: negative   Endocrine: negative   Allergic/Immunologic: negative     Past Medical History:  Past Medical History:   Diagnosis Date   • Allergic    • Anxiety    • Asthma    • Brain tumor (benign) (CMS/HCC)    • Closed tibial fracture 2019    right    • GERD (gastroesophageal reflux disease)    • H/O bone density study 2008   • H/O mammogram 11/15/2012   • H/O mammogram 2018   • History of EKG 2012   • Hyperlipidemia    • Hypertension    • Pap smear for cervical cancer screening 08/15/2011   • Sleep apnea        Past Surgical History:  Past Surgical History:   Procedure Laterality Date   • CARDIOVASCULAR STRESS TEST  2012   • CHOLECYSTECTOMY  2006   • COLONOSCOPY  2008   • DILATION AND CURETTAGE, DIAGNOSTIC / THERAPEUTIC     • HYSTERECTOMY     • LAPAROSCOPIC TUBAL LIGATION          Family History:  Family History   Problem Relation Age of Onset   •  "Hypertension Mother    • Breast cancer Mother    • Breast cancer Maternal Grandmother    • Heart disease Maternal Grandmother    • Heart disease Paternal Grandfather    • Heart disease Father    • Diabetes Brother         Social History:   Social History     Tobacco Use   • Smoking status: Former Smoker     Types: Cigarettes     Quit date:      Years since quittin.2   • Smokeless tobacco: Never Used   Substance Use Topics   • Alcohol use: Yes     Alcohol/week: 1.0 standard drinks     Types: 1 Glasses of wine per week     Comment: Social        Allergies:  Allergies   Allergen Reactions   • Iodinated Diagnostic Agents Hives       Home Meds:  (Not in a hospital admission)      Objective:    Vitals Ranges:      There is no height or weight on file to calculate BMI.     Exam:  General Appearance:  WDWN    HEENT:   without obvious abnormality,  Conjunctiva/corneas clear,  Normal external ear canals, no drainage    Clear orsalmucosa,  Mallampati score 3    Neck:  Supple, symmetrical, trachea midline. No JVD.  Lungs:   Bilateral basal rhonchi bilaterally, respirations unlabored symmetrical wall movement.    Chest wall:  No tenderness or deformity.    Heart:  Regular rate and rhythm, S1 and S2 normal.  Extremities: Trace edema no clubbing or Cyanosis        Data Review:  All labs (24hrs): No results found for this or any previous visit (from the past 24 hour(s)).     Imaging:  Polysomnography 4 or More Parameters With CPAP  Addendum: Positive airway pressure titration STUDY     PATIENT IDENTIFICATION:   Name: Amy L Sturgeon   Age: 50 y.o.   Sex: female   :  1970   MRN: [id number]     DATE OF Study: 3/12/2021    Referring Physician: [unfilled]     WEIGHT: 252   LB       HEIGHT: 65\"       BMI: 41     Reason For study:    Patient with daytime sleepiness  snoring at night sleep study showed  obstructive sleep apnea recommendation to proceed was titration study     Technical description:    this is an overnight " polysomnography study using standardized parameters  including EEG, EOG, EKG, anterior tibialis and chin EMG, blood oxygen  saturation, oral nasal airflow, respiratory area for monitoring with both  abdominal and thoracic gauges and snore monitoring using a standard  titration protocol.      Summary of sleep parameters:   patient went to bed at(9: 34) p.m, woke up with lightt on at(5: 42) a.m  total study time(487) minutes, total sleep time(414) minutes, latency to  sleep onset(1 minute) minutes, latency to REM sleep (355) minutes, sleep  efficiency was  (84) %, sleep stages where(13)% REM sleep , the rest is  non-REM .     Summary of respiratory efforts:   Patient went to sleep starting with CPAP pressure 5 cm H2O, pressure was  titrated to (BiPAP 19/15) centimeter H2O at this pressure patient patient  slept  (51) minutes did not have any apnea hypopnea or snoring or  desaturation.      Summary over the heart rate:   Heart rate was fluctuating between (60) and  (94) bpm.       Summary of periodic leg movement:   Patient had a total of (2) periodic leg movement with arousal and index  (0.3) times per hour.        Impression:   This is a PAP titration study  showing the patient has obstructive sleep  apnea requiring the pressure of (BiPAP 19/15) centimeters H2O to overcome  most apnea hypopnea in the snoring.      Recommendation:   Start the patient on positive airway pressure of (auto BiPAP maximum eye  pressure of 20 maximum a pressure of 10 pressure support of 6) centimeters  H2O follow up with a sleep specialist for further recommendation and  follow-up on compliance, meanwhile avoid supine sleeping, a  voice-activated medication, avoid long-distance driving till all symptoms  resolved.     Alex Mahoney MD. D, ABSM.   4/13/2021   15:15 EDT  Narrative:                                                                                                                              Polysomnography  PATIENT  IDENTIFICATION:  Name: Amy L Sturgeon  Age: 50 y.o.  Sex: female  :  1970  MRN: [id number]    DATE OF Study:  3/12/2021    @BMI@     Reason For study:   Patient with daytime sleepiness  snoring at night , tiredness fatigue   feeling poor quality of sleep  recommendation to proceed  with   polysomnography.    Technical description:   this is an overnight polysomnography study using standardized parameters   including EEG, EOG, EKG, anterior tibialis and chin EMG, blood oxygen   saturation, oral nasal airflow, respiratory area for monitoring with both   abdominal and thoracic gauges and snore monitoring using a standard   titration protocol.     Summary of sleep parameters:  patient went to bed at( 9:34) p.m, woke up with lightt on at(5:42) a.m   total study time(487) minutes, total sleep time(414) minutes, latency to   sleep onset(1) minutes, latency to REM sleep (355) minutes, sleep   efficiency was  (85) %, sleep stages where(13)% REM sleep , the rest is   non-REM .     Summary of respiratory efforts:  Patient had  a total of (119) apnea hypopnea with  AHI(17) times per hour.   longest apnea ( ) seconds,  total RDI was (17) times per hour, Lowest   desaturation was to (79s), .     Summary over the heart rate:  Heart rate was fluctuating between (80) and   (100) bpm.     Summary of periodic leg movement:  Patient had a total of (2) periodic leg movement with arousal and index   (0.3) times per hour.      Impression:  this is a polysomnography study  showing the patient has obstructive sleep   apnea.        Recommendation:   Patient need to follow up with a sleep specialist for further   recommendation and management, avoid supine sleeping, and sedative   medication, avoid long-distance driving till treated and all symptoms   resolved.    Alex Mahoney MD. D, ABSM.  3/21/2021  23:45 EDT        ASSESSMENT:  Diagnoses and all orders for this visit:    Moderate persistent asthma with acute  exacerbation    Obstructive sleep apnea  -     PAP Therapy    Gastroesophageal reflux disease without esophagitis    Other orders  -     Fluticasone-Umeclidin-Vilant (Trelegy Ellipta) 200-62.5-25 MCG/INH aerosol powder ; Inhale 1 puff Daily.  -     albuterol sulfate  (90 Base) MCG/ACT inhaler; Inhale 2 puffs Every 4 (Four) Hours As Needed for Wheezing.        PLAN:   This is patient with symptoms of obstructive sleep apnea, start patient on auto BiPAP, Avoid supine avoid sedative meds in pm, weight loss, Avoid driving. Long discussion with patient about the physiology of MARY CARMEN, and long term and short term   benefit of treating MARY CARMEN         Treating MARY CARMEN will improve gastroesophageal reflux symptoms control    Bronchodilator inhaled corticosteroid Trelegy, and albuterol for rescue    Education how to use inhalers    Encouraged to use incentive spirometer    Continue to exercise slowly as tolerated    Monitor for any change in the color of the sputum    Avoid any exposure to fumes, gas or any irritant        Follow-up 4 weeks after getting CPAP    Alex Mahoney MD. D, ABSM.  4/13/2021  15:19 EDT

## 2021-06-01 ENCOUNTER — OFFICE VISIT (OUTPATIENT)
Dept: FAMILY MEDICINE CLINIC | Facility: CLINIC | Age: 51
End: 2021-06-01

## 2021-06-01 VITALS
HEART RATE: 86 BPM | TEMPERATURE: 98.4 F | BODY MASS INDEX: 42.15 KG/M2 | RESPIRATION RATE: 16 BRPM | SYSTOLIC BLOOD PRESSURE: 132 MMHG | DIASTOLIC BLOOD PRESSURE: 91 MMHG | HEIGHT: 65 IN | OXYGEN SATURATION: 97 % | WEIGHT: 253 LBS

## 2021-06-01 DIAGNOSIS — J45.40 MODERATE PERSISTENT ASTHMA, UNSPECIFIED WHETHER COMPLICATED: ICD-10-CM

## 2021-06-01 DIAGNOSIS — R74.8 ELEVATED ALKALINE PHOSPHATASE LEVEL: ICD-10-CM

## 2021-06-01 DIAGNOSIS — M54.16 LUMBAR BACK PAIN WITH RADICULOPATHY AFFECTING RIGHT LOWER EXTREMITY: ICD-10-CM

## 2021-06-01 DIAGNOSIS — E53.8 B12 DEFICIENCY: ICD-10-CM

## 2021-06-01 DIAGNOSIS — K58.2 IRRITABLE BOWEL SYNDROME WITH BOTH CONSTIPATION AND DIARRHEA: ICD-10-CM

## 2021-06-01 DIAGNOSIS — E66.01 CLASS 3 SEVERE OBESITY DUE TO EXCESS CALORIES WITHOUT SERIOUS COMORBIDITY WITH BODY MASS INDEX (BMI) OF 40.0 TO 44.9 IN ADULT (HCC): ICD-10-CM

## 2021-06-01 DIAGNOSIS — I10 ESSENTIAL HYPERTENSION: Primary | ICD-10-CM

## 2021-06-01 PROCEDURE — 99215 OFFICE O/P EST HI 40 MIN: CPT | Performed by: FAMILY MEDICINE

## 2021-06-01 RX ORDER — MONTELUKAST SODIUM 10 MG/1
10 TABLET ORAL NIGHTLY
Qty: 90 TABLET | Refills: 3 | Status: SHIPPED | OUTPATIENT
Start: 2021-06-01 | End: 2021-12-21 | Stop reason: SDUPTHER

## 2021-06-01 RX ORDER — PHENTERMINE HYDROCHLORIDE 37.5 MG/1
37.5 TABLET ORAL
Qty: 30 TABLET | Refills: 1 | Status: SHIPPED | OUTPATIENT
Start: 2021-06-01 | End: 2021-06-23

## 2021-06-01 NOTE — PATIENT INSTRUCTIONS
"Diet for Irritable Bowel Syndrome  When you have irritable bowel syndrome (IBS), it is very important to eat the foods and follow the eating habits that are best for your condition. IBS may cause various symptoms such as pain in the abdomen, constipation, or diarrhea. Choosing the right foods can help to ease the discomfort from these symptoms. Work with your health care provider and diet and nutrition specialist (dietitian) to find the eating plan that will help to control your symptoms.  What are tips for following this plan?         · Keep a food diary. This will help you identify foods that cause symptoms. Write down:  ? What you eat and when you eat it.  ? What symptoms you have.  ? When symptoms occur in relation to your meals, such as \"pain in abdomen 2 hours after dinner.\"  · Eat your meals slowly and in a relaxed setting.  · Aim to eat 5-6 small meals per day. Do not skip meals.  · Drink enough fluid to keep your urine pale yellow.  · Ask your health care provider if you should take an over-the-counter probiotic to help restore healthy bacteria in your gut (digestive tract).  ? Probiotics are foods that contain good bacteria and yeasts.  · Your dietitian may have specific dietary recommendations for you based on your symptoms. He or she may recommend that you:  ? Avoid foods that cause symptoms. Talk with your dietitian about other ways to get the same nutrients that are in those problem foods.  ? Avoid foods with gluten. Gluten is a protein that is found in rye, wheat, and barley.  ? Eat more foods that contain soluble fiber. Examples of foods with high soluble fiber include oats, seeds, and certain fruits and vegetables. Take a fiber supplement if directed by your dietitian.  ? Reduce or avoid certain foods called FODMAPs. These are foods that contain carbohydrates that are hard to digest. Ask your doctor which foods contain these carbohydrates.  What foods are not recommended?  The following are some " foods and drinks that may make your symptoms worse:  · Fatty foods, such as french fries.  · Foods that contain gluten, such as pasta and cereal.  · Dairy products, such as milk, cheese, and ice cream.  · Chocolate.  · Alcohol.  · Products with caffeine, such as coffee.  · Carbonated drinks, such as soda.  · Foods that are high in FODMAPs. These include certain fruits and vegetables.  · Products with sweeteners such as honey, high fructose corn syrup, sorbitol, and mannitol.  The items listed above may not be a complete list of foods and beverages you should avoid. Contact a dietitian for more information.  What foods are good sources of fiber?  Your health care provider or dietitian may recommend that you eat more foods that contain fiber. Fiber can help to reduce constipation and other IBS symptoms. Add foods with fiber to your diet a little at a time so your body can get used to them. Too much fiber at one time might cause gas and swelling of your abdomen. The following are some foods that are good sources of fiber:  · Berries, such as raspberries, strawberries, and blueberries.  · Tomatoes.  · Carrots.  · Brown rice.  · Oats.  · Seeds, such as jennifer and pumpkin seeds.  The items listed above may not be a complete list of recommended sources of fiber. Contact your dietitian for more options.  Where to find more information  · International Foundation for Functional Gastrointestinal Disorders: www.iffgd.org  · [employer] [place] [employer] Diabetes and Digestive and Kidney Diseases: www.niddk.nih.gov  Summary  · When you have irritable bowel syndrome (IBS), it is very important to eat the foods and follow the eating habits that are best for your condition.  · IBS may cause various symptoms such as pain in the abdomen, constipation, or diarrhea.  · Choosing the right foods can help to ease the discomfort that comes from symptoms.  · Keep a food diary. This will help you identify foods that cause symptoms.  · Your health  care provider or diet and nutrition specialist (dietitian) may recommend that you eat more foods that contain fiber.  This information is not intended to replace advice given to you by your health care provider. Make sure you discuss any questions you have with your health care provider.  Document Revised: 04/08/2020 Document Reviewed: 08/21/2018  [employer] Patient Education © 2021 [employer]    Irritable Bowel Syndrome, Adult    Irritable bowel syndrome (IBS) is a group of symptoms that affects the organs responsible for digestion (gastrointestinal or GI tract). IBS is not one specific disease.  To regulate how the GI tract works, the body sends signals back and forth between the intestines and the brain. If you have IBS, there may be a problem with these signals. As a result, the GI tract does not function normally. The intestines may become more sensitive and overreact to certain things. This may be especially true when you eat certain foods or when you are under stress.  There are four types of IBS. These may be determined based on the consistency of your stool (feces):  · IBS with diarrhea.  · IBS with constipation.  · Mixed IBS.  · Unsubtyped IBS.  It is important to know which type of IBS you have. Certain treatments are more likely to be helpful for certain types of IBS.  What are the causes?  The exact cause of IBS is not known.  What increases the risk?  You may have a higher risk for IBS if you:  · Are female.  · Are younger than 40.  · Have a family history of IBS.  · Have a mental health condition, such as depression, anxiety, or post-traumatic stress disorder.  · Have had a bacterial infection of your GI tract.  What are the signs or symptoms?  Symptoms of IBS vary from person to person. The main symptom is abdominal pain or discomfort. Other symptoms usually include one or more of the following:  · Diarrhea, constipation, or both.  · Abdominal swelling or bloating.  · Feeling full after eating a  small or regular-sized meal.  · Frequent gas.  · Mucus in the stool.  · A feeling of having more stool left after a bowel movement.  Symptoms tend to come and go. They may be triggered by stress, mental health conditions, or certain foods.  How is this diagnosed?  This condition may be diagnosed based on a physical exam, your medical history, and your symptoms. You may have tests, such as:  · Blood tests.  · Stool test.  · X-rays.  · CT scan.  · Colonoscopy. This is a procedure in which your GI tract is viewed with a long, thin, flexible tube.  How is this treated?  There is no cure for IBS, but treatment can help relieve symptoms. Treatment depends on the type of IBS you have, and may include:  · Changes to your diet, such as:  ? Avoiding foods that cause symptoms.  ? Drinking more water.  ? Following a low-FODMAP (fermentable oligosaccharides, disaccharides, monosaccharides, and polyols) diet for up to 6 weeks, or as told by your health care provider. FODMAPs are sugars that are hard for some people to digest.  ? Eating more fiber.  ? Eating medium-sized meals at the same times every day.  · Medicines. These may include:  ? Fiber supplements, if you have constipation.  ? Medicine to control diarrhea (antidiarrheal medicines).  ? Medicine to help control muscle tightening (spasms) in your GI tract (antispasmodic medicines).  ? Medicines to help with mental health conditions, such as antidepressants or tranquilizers.  · Talk therapy or counseling.  · Working with a diet and nutrition specialist (dietitian) to help create a food plan that is right for you.  · Managing your stress.  Follow these instructions at home:  Eating and drinking  · Eat a healthy diet.  · Eat medium-sized meals at about the same time every day. Do not eat large meals.  · Gradually eat more fiber-rich foods. These include whole grains, fruits, and vegetables. This may be especially helpful if you have IBS with constipation.  · Eat a diet low in  FODMAPs.  · Drink enough fluid to keep your urine pale yellow.  · Keep a journal of foods that seem to trigger symptoms.  · Avoid foods and drinks that:  ? Contain added sugar.  ? Make your symptoms worse. Dairy products, caffeinated drinks, and carbonated drinks can make symptoms worse for some people.  General instructions  · Take over-the-counter and prescription medicines and supplements only as told by your health care provider.  · Get enough exercise. Do at least 150 minutes of moderate-intensity exercise each week.  · Manage your stress. Getting enough sleep and exercise can help you manage stress.  · Keep all follow-up visits as told by your health care provider and therapist. This is important.  Alcohol Use  · Do not drink alcohol if:  ? Your health care provider tells you not to drink.  ? You are pregnant, may be pregnant, or are planning to become pregnant.  · If you drink alcohol, limit how much you have:  ? 0-1 drink a day for women.  ? 0-2 drinks a day for men.  · Be aware of how much alcohol is in your drink. In the [place] one drink equals one typical bottle of beer (12 oz), one-half glass of wine (5 oz), or one shot of hard liquor (1½ oz).  Contact a health care provider if you have:  · Constant pain.  · Weight loss.  · Difficulty or pain when swallowing.  · Diarrhea that gets worse.  Get help right away if you have:  · Severe abdominal pain.  · Fever.  · Diarrhea with symptoms of dehydration, such as dizziness or dry mouth.  · Bright red blood in your stool.  · Stool that is black and tarry.  · Abdominal swelling.  · Vomiting that does not stop.  · Blood in your vomit.  Summary  · Irritable bowel syndrome (IBS) is not one specific disease. It is a group of symptoms that affects digestion.  · Your intestines may become more sensitive and overreact to certain things. This may be especially true when you eat certain foods or when you are under stress.  · There is no cure for IBS, but treatment can help  relieve symptoms.  This information is not intended to replace advice given to you by your health care provider. Make sure you discuss any questions you have with your health care provider.  Document Revised: 12/11/2018 Document Reviewed: 12/11/2018  [employer] Patient Education © 2021 [employer]    Calorie Counting for Weight Loss  Calories are units of energy. Your body needs a certain amount of calories from food to keep you going throughout the day. When you eat more calories than your body needs, your body stores the extra calories as fat. When you eat fewer calories than your body needs, your body burns fat to get the energy it needs.  Calorie counting means keeping track of how many calories you eat and drink each day. Calorie counting can be helpful if you need to lose weight. If you make sure to eat fewer calories than your body needs, you should lose weight. Ask your health care provider what a healthy weight is for you.  For calorie counting to work, you will need to eat the right number of calories in a day in order to lose a healthy amount of weight per week. A dietitian can help you determine how many calories you need in a day and will give you suggestions on how to reach your calorie goal.  · A healthy amount of weight to lose per week is usually 1-2 lb (0.5-0.9 kg). This usually means that your daily calorie intake should be reduced by 500-750 calories.  · Eating 1,200 - 1,500 calories per day can help most women lose weight.  · Eating 1,500 - 1,800 calories per day can help most men lose weight.  What is my plan?  My goal is to have __________ calories per day.  If I have this many calories per day, I should lose around __________ pounds per week.  What do I need to know about calorie counting?  In order to meet your daily calorie goal, you will need to:  · Find out how many calories are in each food you would like to eat. Try to do this before you eat.  · Decide how much of the food you plan to  eat.  · Write down what you ate and how many calories it had. Doing this is called keeping a food log.  To successfully lose weight, it is important to balance calorie counting with a healthy lifestyle that includes regular activity. Aim for 150 minutes of moderate exercise (such as walking) or 75 minutes of vigorous exercise (such as running) each week.  Where do I find calorie information?    The number of calories in a food can be found on a Nutrition Facts label. If a food does not have a Nutrition Facts label, try to look up the calories online or ask your dietitian for help.  Remember that calories are listed per serving. If you choose to have more than one serving of a food, you will have to multiply the calories per serving by the amount of servings you plan to eat. For example, the label on a package of bread might say that a serving size is 1 slice and that there are 90 calories in a serving. If you eat 1 slice, you will have eaten 90 calories. If you eat 2 slices, you will have eaten 180 calories.  How do I keep a food log?  Immediately after each meal, record the following information in your food log:  · What you ate. Don't forget to include toppings, sauces, and other extras on the food.  · How much you ate. This can be measured in cups, ounces, or number of items.  · How many calories each food and drink had.  · The total number of calories in the meal.  Keep your food log near you, such as in a small notebook in your pocket, or use a mobile jo ann or website. Some programs will calculate calories for you and show you how many calories you have left for the day to meet your goal.  What are some calorie counting tips?    · Use your calories on foods and drinks that will fill you up and not leave you hungry:  ? Some examples of foods that fill you up are nuts and nut butters, vegetables, lean proteins, and high-fiber foods like whole grains. High-fiber foods are foods with more than 5 g fiber per  "serving.  ? Drinks such as sodas, specialty coffee drinks, alcohol, and juices have a lot of calories, yet do not fill you up.  · Eat nutritious foods and avoid empty calories. Empty calories are calories you get from foods or beverages that do not have many vitamins or protein, such as candy, sweets, and soda. It is better to have a nutritious high-calorie food (such as an avocado) than a food with few nutrients (such as a bag of chips).  · Know how many calories are in the foods you eat most often. This will help you calculate calorie counts faster.  · Pay attention to calories in drinks. Low-calorie drinks include water and unsweetened drinks.  · Pay attention to nutrition labels for \"low fat\" or \"fat free\" foods. These foods sometimes have the same amount of calories or more calories than the full fat versions. They also often have added sugar, starch, or salt, to make up for flavor that was removed with the fat.  · Find a way of tracking calories that works for you. Get creative. Try different apps or programs if writing down calories does not work for you.  What are some portion control tips?  · Know how many calories are in a serving. This will help you know how many servings of a certain food you can have.  · Use a measuring cup to measure serving sizes. You could also try weighing out portions on a kitchen scale. With time, you will be able to estimate serving sizes for some foods.  · Take some time to put servings of different foods on your favorite plates, bowls, and cups so you know what a serving looks like.  · Try not to eat straight from a bag or box. Doing this can lead to overeating. Put the amount you would like to eat in a cup or on a plate to make sure you are eating the right portion.  · Use smaller plates, glasses, and bowls to prevent overeating.  · Try not to multitask (for example, watch TV or use your computer) while eating. If it is time to eat, sit down at a table and enjoy your food. " "This will help you to know when you are full. It will also help you to be aware of what you are eating and how much you are eating.  What are tips for following this plan?  Reading food labels  · Check the calorie count compared to the serving size. The serving size may be smaller than what you are used to eating.  · Check the source of the calories. Make sure the food you are eating is high in vitamins and protein and low in saturated and trans fats.  Shopping  · Read nutrition labels while you shop. This will help you make healthy decisions before you decide to purchase your food.  · Make a grocery list and stick to it.  Cooking  · Try to cook your favorite foods in a healthier way. For example, try baking instead of frying.  · Use low-fat dairy products.  Meal planning  · Use more fruits and vegetables. Half of your plate should be fruits and vegetables.  · Include lean proteins like poultry and fish.  How do I count calories when eating out?  · Ask for smaller portion sizes.  · Consider sharing an entree and sides instead of getting your own entree.  · If you get your own entree, eat only half. Ask for a box at the beginning of your meal and put the rest of your entree in it so you are not tempted to eat it.  · If calories are listed on the menu, choose the lower calorie options.  · Choose dishes that include vegetables, fruits, whole grains, low-fat dairy products, and lean protein.  · Choose items that are boiled, broiled, grilled, or steamed. Stay away from items that are buttered, battered, fried, or served with cream sauce. Items labeled \"crispy\" are usually fried, unless stated otherwise.  · Choose water, low-fat milk, unsweetened iced tea, or other drinks without added sugar. If you want an alcoholic beverage, choose a lower calorie option such as a glass of wine or light beer.  · Ask for dressings, sauces, and syrups on the side. These are usually high in calories, so you should limit the amount you " eat.  · If you want a salad, choose a garden salad and ask for grilled meats. Avoid extra toppings like brandon, cheese, or fried items. Ask for the dressing on the side, or ask for olive oil and vinegar or lemon to use as dressing.  · Estimate how many servings of a food you are given. For example, a serving of cooked rice is ½ cup or about the size of half a baseball. Knowing serving sizes will help you be aware of how much food you are eating at restaurants. The list below tells you how big or small some common portion sizes are based on everyday objects:  ? 1 oz--4 stacked dice.  ? 3 oz--1 deck of cards.  ? 1 tsp--1 die.  ? 1 Tbsp--½ a ping-pong ball.  ? 2 Tbsp--1 ping-pong ball.  ? ½ cup--½ baseball.  ? 1 cup--1 baseball.  Summary  · Calorie counting means keeping track of how many calories you eat and drink each day. If you eat fewer calories than your body needs, you should lose weight.  · A healthy amount of weight to lose per week is usually 1-2 lb (0.5-0.9 kg). This usually means reducing your daily calorie intake by 500-750 calories.  · The number of calories in a food can be found on a Nutrition Facts label. If a food does not have a Nutrition Facts label, try to look up the calories online or ask your dietitian for help.  · Use your calories on foods and drinks that will fill you up, and not on foods and drinks that will leave you hungry.  · Use smaller plates, glasses, and bowls to prevent overeating.  This information is not intended to replace advice given to you by your health care provider. Make sure you discuss any questions you have with your health care provider.  Document Revised: 09/06/2019 Document Reviewed: 11/17/2017  [employer] Patient Education © 2020 [employer]

## 2021-06-01 NOTE — PROGRESS NOTES
"Chief Complaint  Hypertension, Back Pain, and Numbness    History of Present Illness  Amy L Sturgeon presents today for follow-up on hypertension, back pain and numbness.     Patient states she has not been checking her pressures at home.     Patient states her back pain still bothers her. Her pain level is a 3. Patient states everything bothers it.     Patient states she does not have numbness but has a lot of hola horse in the right inter thigh that runs from her back pain.  She does have history of low back pain with right-sided sciatica, have seen specialist for this in the past and has had epidural injections.  At last visit in December discussed that myalgias our possibly secondary to lumbar degenerative changes and/or sciatica, did advised to stretch regularly, remain hydrated, and if becomes more problematic will need further evaluation.  Increased Neurontin to 800 mg 3 times daily and up to previous level of 4 times daily if needed. Not helping enough.  Started meloxicam to replace Feldene.    Believe lower abd weight is contributing to back pain.  She has not been able to lose weight.  Cannot exercise due to back pain. Have used B12 shots and phentermine in past did help.     Did start CPAP a month ago with Dr Mahoney, also using trelogy and  albuterol   Still a lot of mucus and allergy symptoms, Zyrtec is not strong enough. Did use singulair years ago. Believe was helpful. Have used nasal steroid not using consistantly may help some.    Hisotry of IBS and Colitis, diagnosed by Dr Hwang. If use antihistamines,  advil congestion sinus pressure or mucinex dry out gut and triggers constipation. Alternate between diarrhea and constipation. Prescription bentyl in the past was helpful with cramping. Humeria for arthritis did not help arthetits but \"cured stomach issues\".   Subjective            Current Outpatient Medications on File Prior to Visit   Medication Sig   • albuterol sulfate  (90 Base) " "MCG/ACT inhaler Inhale 2 puffs Every 4 (Four) Hours As Needed for Wheezing.   • amLODIPine (NORVASC) 2.5 MG tablet Take 1 tablet by mouth Daily.   • atorvastatin (LIPITOR) 20 MG tablet Take 1 tablet by mouth Every Night.   • cetirizine (zyrTEC) 10 MG tablet Take 10 mg by mouth Every Morning.   • cycloSPORINE (RESTASIS) 0.05 % ophthalmic emulsion Apply 1 drop to eye(s) as directed by provider 2 (Two) Times a Day.   • DULoxetine (CYMBALTA) 60 MG capsule Take 1 capsule by mouth Daily.   • Fluticasone-Umeclidin-Vilant (Trelegy Ellipta) 200-62.5-25 MCG/INH aerosol powder  Inhale 1 puff Daily.   • gabapentin (NEURONTIN) 800 MG tablet TAKE 1 TABLET BY MOUTH 3  TIMES DAILY (MAY TAKE UP TO 4 TIMES DAILY IF NEEDED)   • hydrOXYzine pamoate (VISTARIL) 25 MG capsule Take 1 capsule by mouth 3 (Three) Times a Day As Needed for Anxiety.   • losartan (COZAAR) 100 MG tablet Take 1 tablet by mouth Daily.   • meclizine (ANTIVERT) 12.5 MG tablet Take 1 tablet by mouth 3 (Three) Times a Day As Needed.   • meloxicam (MOBIC) 15 MG tablet Take 1 tablet by mouth Daily.   • MYRBETRIQ 50 MG tablet sustained-release 24 hour 24 hr tablet Take 1 tablet by mouth Daily.   • omeprazole (priLOSEC) 40 MG capsule Take 1 capsule by mouth Daily.   • traZODone (DESYREL) 50 MG tablet Take 1 tablet by mouth Every Night.   • [DISCONTINUED] Fluticasone Furoate-Vilanterol (BREO ELLIPTA) 200-25 MCG/INH inhaler Inhale 1 puff Daily.   • [DISCONTINUED] gabapentin (NEURONTIN) 800 MG tablet 1 tab tid, increase to qid if needed   • [DISCONTINUED] VENTOLIN  (90 Base) MCG/ACT inhaler USE 2 PUFFS BY MOUTH FOUR TIMES A DAY AS NEEDED     No current facility-administered medications on file prior to visit.       Objective   Vital Signs:   /91 (BP Location: Left arm, Patient Position: Sitting, Cuff Size: Large Adult)   Pulse 86   Temp 98.4 °F (36.9 °C) (Infrared)   Resp 16   Ht 165.1 cm (65\")   Wt 115 kg (253 lb)   SpO2 97%   BMI 42.10 kg/m²     Physical " Exam  Constitutional:       General: She is not in acute distress.     Appearance: She is well-developed. She is obese.   HENT:      Head: Normocephalic and atraumatic.      Right Ear: External ear normal.      Left Ear: External ear normal.   Eyes:      Conjunctiva/sclera: Conjunctivae normal.      Pupils: Pupils are equal, round, and reactive to light.   Neck:      Thyroid: No thyromegaly.      Trachea: No tracheal deviation.   Cardiovascular:      Rate and Rhythm: Normal rate and regular rhythm.      Heart sounds: No murmur heard.   No friction rub. No gallop.    Pulmonary:      Effort: Pulmonary effort is normal.      Breath sounds: Normal breath sounds. No wheezing or rales.   Abdominal:      General: Bowel sounds are normal.      Palpations: Abdomen is soft.      Tenderness: There is no abdominal tenderness.   Musculoskeletal:         General: Normal range of motion.      Cervical back: Normal range of motion and neck supple.      Comments: Patient's is unable to find comfortable position she is frequently leaning forward over exam table changing from sitting to standing throughout entire encounter   Lymphadenopathy:      Cervical: No cervical adenopathy.   Skin:     General: Skin is warm and dry.      Findings: No rash.   Neurological:      Mental Status: She is alert and oriented to person, place, and time.   Psychiatric:         Mood and Affect: Mood normal.         Behavior: Behavior normal.         Thought Content: Thought content normal.                Lab Results   Component Value Date    HGBA1C 6.0 (H) 07/23/2019                Assessment and Plan    Diagnoses and all orders for this visit:    1. Essential hypertension (Primary)    2. Irritable bowel syndrome with both constipation and diarrhea  -     Ambulatory Referral to Gastroenterology    3. Lumbar back pain with radiculopathy affecting right lower extremity    4. Class 3 severe obesity due to excess calories without serious comorbidity with body  mass index (BMI) of 40.0 to 44.9 in adult (CMS/[facility])  -     Ambulatory Referral to Bariatric Surgery  -     phentermine (ADIPEX-P) 37.5 MG tablet; Take 1 tablet by mouth Every Morning Before Breakfast.  Dispense: 30 tablet; Refill: 1    5. B12 deficiency  -     Vitamin B12    6. Elevated alkaline phosphatase level  -     Comprehensive Metabolic Panel    7. Moderate persistent asthma, unspecified whether complicated  -     montelukast (Singulair) 10 MG tablet; Take 1 tablet by mouth Every Night.  Dispense: 90 tablet; Refill: 3    Obesity, prescription for phentermine discussed reduced calorie diet.  At patient's request referring to bariatric surgeon for evaluation for possible surgical intervention.    Chronic low back pain patient declines x-rays or referral to physical therapy order back specialist at this time.    IBS, advised patient to increase fiber slowly and be consistent with fiber in diet to help fluctuations in constipation diarrhea, patient has old prescription for Bentyl and Trileptal got cramping declines new prescription referring to GI for further evaluation.    Asthma, adding Singulair    Allergic rhinitis continue nasal steroid      Medications Discontinued During This Encounter   Medication Reason   • Fluticasone Furoate-Vilanterol (BREO ELLIPTA) 200-25 MCG/INH inhaler Dose adjustment   • VENTOLIN  (90 Base) MCG/ACT inhaler Dose adjustment       I spent 60 minutes caring for Marcela on this date of service. This time includes time spent by me in the following activities:preparing for the visit, reviewing tests, obtaining and/or reviewing a separately obtained history, performing a medically appropriate examination and/or evaluation , counseling and educating the patient/family/caregiver, ordering medications, tests, or procedures, referring and communicating with other health care professionals  and care coordination  Follow Up     Return in about 4 weeks (around 6/29/2021) for Recheck weight  and blood pressure.    Patient was given instructions and counseling regarding her condition or for health maintenance advice. Please see specific information pulled into the AVS if appropriate.

## 2021-06-02 LAB
ALBUMIN SERPL-MCNC: 4.5 G/DL (ref 3.8–4.8)
ALBUMIN/GLOB SERPL: 1.7 {RATIO} (ref 1.2–2.2)
ALP SERPL-CCNC: 144 IU/L (ref 48–121)
ALT SERPL-CCNC: 18 IU/L (ref 0–32)
AST SERPL-CCNC: 19 IU/L (ref 0–40)
BILIRUB SERPL-MCNC: 0.5 MG/DL (ref 0–1.2)
BUN SERPL-MCNC: 16 MG/DL (ref 6–24)
BUN/CREAT SERPL: 17 (ref 9–23)
CALCIUM SERPL-MCNC: 10 MG/DL (ref 8.7–10.2)
CHLORIDE SERPL-SCNC: 103 MMOL/L (ref 96–106)
CO2 SERPL-SCNC: 27 MMOL/L (ref 20–29)
CREAT SERPL-MCNC: 0.95 MG/DL (ref 0.57–1)
GLOBULIN SER CALC-MCNC: 2.7 G/DL (ref 1.5–4.5)
GLUCOSE SERPL-MCNC: 99 MG/DL (ref 65–99)
POTASSIUM SERPL-SCNC: 5.4 MMOL/L (ref 3.5–5.2)
PROT SERPL-MCNC: 7.2 G/DL (ref 6–8.5)
SODIUM SERPL-SCNC: 145 MMOL/L (ref 134–144)
VIT B12 SERPL-MCNC: 393 PG/ML (ref 232–1245)

## 2021-06-03 DIAGNOSIS — E87.5 HYPERKALEMIA: Primary | ICD-10-CM

## 2021-06-04 PROBLEM — J01.90 SINUSITIS, ACUTE: Status: RESOLVED | Noted: 2019-07-09 | Resolved: 2021-06-04

## 2021-06-04 PROBLEM — G47.33 OSA TREATED WITH BIPAP: Status: ACTIVE | Noted: 2021-03-12

## 2021-06-23 ENCOUNTER — OFFICE VISIT (OUTPATIENT)
Dept: FAMILY MEDICINE CLINIC | Facility: CLINIC | Age: 51
End: 2021-06-23

## 2021-06-23 VITALS
TEMPERATURE: 97.7 F | OXYGEN SATURATION: 100 % | HEART RATE: 70 BPM | RESPIRATION RATE: 16 BRPM | WEIGHT: 243.8 LBS | BODY MASS INDEX: 40.62 KG/M2 | DIASTOLIC BLOOD PRESSURE: 81 MMHG | HEIGHT: 65 IN | SYSTOLIC BLOOD PRESSURE: 114 MMHG

## 2021-06-23 DIAGNOSIS — E66.01 CLASS 3 SEVERE OBESITY DUE TO EXCESS CALORIES WITHOUT SERIOUS COMORBIDITY WITH BODY MASS INDEX (BMI) OF 40.0 TO 44.9 IN ADULT (HCC): ICD-10-CM

## 2021-06-23 DIAGNOSIS — F33.41 RECURRENT MAJOR DEPRESSIVE DISORDER, IN PARTIAL REMISSION (HCC): ICD-10-CM

## 2021-06-23 DIAGNOSIS — I10 ESSENTIAL HYPERTENSION: Primary | ICD-10-CM

## 2021-06-23 DIAGNOSIS — E78.2 MIXED HYPERLIPIDEMIA: ICD-10-CM

## 2021-06-23 DIAGNOSIS — E87.5 HYPERKALEMIA: ICD-10-CM

## 2021-06-23 PROCEDURE — 99214 OFFICE O/P EST MOD 30 MIN: CPT | Performed by: FAMILY MEDICINE

## 2021-06-23 RX ORDER — ATORVASTATIN CALCIUM 20 MG/1
20 TABLET, FILM COATED ORAL NIGHTLY
Qty: 90 TABLET | Refills: 3 | Status: SHIPPED | OUTPATIENT
Start: 2021-06-23 | End: 2021-12-21 | Stop reason: SDUPTHER

## 2021-06-23 RX ORDER — DULOXETIN HYDROCHLORIDE 60 MG/1
60 CAPSULE, DELAYED RELEASE ORAL DAILY
Qty: 90 CAPSULE | Refills: 3 | Status: SHIPPED | OUTPATIENT
Start: 2021-06-23 | End: 2022-08-01

## 2021-06-23 RX ORDER — AMLODIPINE BESYLATE 2.5 MG/1
2.5 TABLET ORAL DAILY
Qty: 90 TABLET | Refills: 3 | Status: CANCELLED | OUTPATIENT
Start: 2021-06-23

## 2021-06-23 RX ORDER — PHENTERMINE HYDROCHLORIDE 37.5 MG/1
37.5 TABLET ORAL
Qty: 30 TABLET | Refills: 1 | Status: SHIPPED | OUTPATIENT
Start: 2021-06-23 | End: 2021-08-30 | Stop reason: SDUPTHER

## 2021-07-11 PROBLEM — E87.5 HYPERKALEMIA: Status: ACTIVE | Noted: 2021-07-11

## 2021-08-30 ENCOUNTER — OFFICE VISIT (OUTPATIENT)
Dept: FAMILY MEDICINE CLINIC | Facility: CLINIC | Age: 51
End: 2021-08-30

## 2021-08-30 VITALS
HEIGHT: 65 IN | BODY MASS INDEX: 37.95 KG/M2 | SYSTOLIC BLOOD PRESSURE: 126 MMHG | WEIGHT: 227.8 LBS | HEART RATE: 78 BPM | TEMPERATURE: 97.3 F | OXYGEN SATURATION: 98 % | DIASTOLIC BLOOD PRESSURE: 86 MMHG

## 2021-08-30 DIAGNOSIS — E66.01 MORBID (SEVERE) OBESITY DUE TO EXCESS CALORIES (HCC): ICD-10-CM

## 2021-08-30 DIAGNOSIS — R63.4 WEIGHT LOSS: ICD-10-CM

## 2021-08-30 DIAGNOSIS — I10 ESSENTIAL HYPERTENSION: Primary | ICD-10-CM

## 2021-08-30 DIAGNOSIS — K59.00 CONSTIPATION, UNSPECIFIED CONSTIPATION TYPE: ICD-10-CM

## 2021-08-30 PROCEDURE — 99214 OFFICE O/P EST MOD 30 MIN: CPT | Performed by: FAMILY MEDICINE

## 2021-08-30 RX ORDER — PHENTERMINE HYDROCHLORIDE 37.5 MG/1
37.5 TABLET ORAL
Qty: 30 TABLET | Refills: 3 | Status: SHIPPED | OUTPATIENT
Start: 2021-08-30 | End: 2021-11-30 | Stop reason: SDUPTHER

## 2021-08-30 RX ORDER — POLYETHYLENE GLYCOL 3350 17 G/17G
17 POWDER, FOR SOLUTION ORAL DAILY
Qty: 850 G | Refills: 3 | Status: SHIPPED | OUTPATIENT
Start: 2021-08-30

## 2021-08-30 NOTE — PATIENT INSTRUCTIONS
Constipation, Adult  Constipation is when a person has fewer than three bowel movements in a week, has difficulty having a bowel movement, or has stools (feces) that are dry, hard, or larger than normal. Constipation may be caused by an underlying condition. It may become worse with age if a person takes certain medicines and does not take in enough fluids.  Follow these instructions at home:  Eating and drinking    · Eat foods that have a lot of fiber, such as beans, whole grains, and fresh fruits and vegetables.  · Limit foods that are low in fiber and high in fat and processed sugars, such as fried or sweet foods. These include french fries, hamburgers, cookies, candies, and soda.  · Drink enough fluid to keep your urine pale yellow.  General instructions  · Exercise regularly or as told by your health care provider. Try to do 150 minutes of moderate exercise each week.  · Use the bathroom when you have the urge to go. Do not hold it in.  · Take over-the-counter and prescription medicines only as told by your health care provider. This includes any fiber supplements.  · During bowel movements:  ? Practice deep breathing while relaxing the lower abdomen.  ? Practice pelvic floor relaxation.  · Watch your condition for any changes. Let your health care provider know about them.  · Keep all follow-up visits as told by your health care provider. This is important.  Contact a health care provider if:  · You have pain that gets worse.  · You have a fever.  · You do not have a bowel movement after 4 days.  · You vomit.  · You are not hungry or you lose weight.  · You are bleeding from the opening between the buttocks (anus).  · You have thin, pencil-like stools.  Get help right away if:  · You have a fever and your symptoms suddenly get worse.  · You leak stool or have blood in your stool.  · Your abdomen is bloated.  · You have severe pain in your abdomen.  · You feel dizzy or you faint.  Summary  · Constipation is  when a person has fewer than three bowel movements in a week, has difficulty having a bowel movement, or has stools (feces) that are dry, hard, or larger than normal.  · Eat foods that have a lot of fiber, such as beans, whole grains, and fresh fruits and vegetables.  · Drink enough fluid to keep your urine pale yellow.  · Take over-the-counter and prescription medicines only as told by your health care provider. This includes any fiber supplements.  This information is not intended to replace advice given to you by your health care provider. Make sure you discuss any questions you have with your health care provider.  Document Revised: 11/04/2020 Document Reviewed: 11/04/2020  [employer] Patient Education © 2021 [employer]    Chronic Constipation  Chronic constipation is a condition in which a person has three or fewer bowel movements a week, for 3 months or longer. This condition is especially common in older adults.  What are the causes?  Causes of chronic constipation may include:  · Not drinking enough fluid, eating enough food or fiber, or getting enough physical activity.  · Pregnancy.  · A tear in the anus (anal fissure).  · Blockage in the bowel (bowel obstruction).  · Narrowing of the bowel (bowel stricture).  · Having a long-term medical condition, such as:  ? Diabetes, hypothyroidism, or iron-deficiency anemia.  ? Stroke or spinal cord injury.  ? Multiple sclerosis or Parkinson's disease.  ? Colon cancer.  ? Dementia.  ? Inflammatory bowel disease (IBD), outward collapse of the rectum (rectal prolapse), or hemorrhoids.  · Taking certain medicines, including:  ? Narcotics. These are a certain type of prescription pain medicine.  ? Antacids or iron supplements.  ? Water pills (diuretics).  ? Certain blood pressure medicines.  ? Anti-seizure medicines.  ? Antidepressants.  ? Medicines for Parkinson's disease.  Other causes of this condition may include:  · Stress.  · Problems in the nerves and muscles that  control the movement of stool.  · Weak or impaired pelvic floor muscles.  What increases the risk?  You may be at higher risk for chronic constipation if:  · You are older than age 70.  · You are female.  · You live in a long-term care facility.  · You have a long-term disease.  · You have a mental health disorder or eating disorder.  What are the signs or symptoms?  The main symptom of chronic constipation is having three or fewer bowel movements a week for several weeks. Other signs and symptoms may vary from person to person. These include:  · Pushing hard (straining) to pass stool, or having hard or lumpy stools.  · Painful bowel movements.  · Having lower abdominal discomfort, such as cramps or bloating.  · Being unable to have a bowel movement when you feel the urge, or feeling like you still need to pass stool after a bowel movement.  · Feeling that you have something in your rectum that is blocking or preventing bowel movements.  · Seeing blood on the toilet paper or in your stool.  · Worsening confusion (in older adults).  How is this diagnosed?  This condition may be diagnosed based on:  · Your symptoms and medical history. You will be asked about your symptoms, lifestyle, diet, and any medicines that you are taking.  · A physical exam.  ? Your abdomen will be examined.  ? A digital rectal exam may be done. For this exam, a health care provider places a lubricated, gloved finger into the rectum.  · Tests to check for any underlying causes of your constipation. These may be ordered if you have bleeding in your rectum, weight loss, or a family history of colon cancer. In these cases, you may have:  ? Imaging studies of the colon. These may include X-ray, ultrasound, or a CT scan.  ? Blood tests.  ? A procedure to examine the inside of your colon (colonoscopy).  ? More specialized tests to check:  § Whether your anal sphincter works well. This is a ring-shaped muscle that controls the closing of the  anus.  § How well food moves through your colon.  ? Tests to measure the nerve signal in your pelvic floor muscles (electromyography).  How is this treated?  Treatment for chronic constipation depends on the cause. Most often, treatment starts with:  · Being more active and getting regular exercise.  · Drinking more fluids.  · Adding fiber to your diet. Sources of fiber include fruits, vegetables, whole grains, and fiber supplements.  · Using medicines such as stool softeners or medicines that increase contractions in your digestive system (pro-motility agents).  · Training your pelvic muscles with biofeedback.  · Surgery, if there is obstruction.  Treatment may also include:  · Stopping or changing some medicines if they cause constipation.  · Using a fiber supplement (bulk laxative) or stool softener.  · Using a prescription laxative. This works by absorbing water into your colon (osmotic laxative).  You may also need to see a specialist who treats conditions of the digestive system (gastroenterologist).  Follow these instructions at home:  Medicines  · Take over-the-counter and prescription medicines only as told by your health care provider.  · If you are taking a laxative, take it as told by your health care provider.  Eating and drinking    · Eat a balanced diet that includes enough fiber. Ask your health care provider to recommend a diet that is right for you.  · Drink clear fluids, especially water. Avoid drinking alcohol, caffeine, and soda. These can make constipation worse.  · Drink enough fluid to keep your urine pale yellow.  General instructions  · Get some physical activity every day. Ask your health care provider what activities are safe for you.  · Get colon cancer screenings as told by your health care provider.  · Keep all follow-up visits as told by your health care provider. This is important.  Contact a health care provider if you have:  · Three or fewer bowel movements a week.  · Stools that  are hard or lumpy.  · Blood on the toilet paper or in your stool after you have a bowel movement.  · Unexplained weight loss.  · Rectum (rectal) pain.  · Stool leakage.  · Nausea or vomiting.  Get help right away if you have:  · Rectal bleeding or you pass blood clots.  · Severe rectal pain.  · Body tissue that pushes out (protrudes) from your anus.  · Severe pain or bloating (distension) in your abdomen.  · Vomiting that you cannot control.  Summary  · Chronic constipation is a condition in which a person has three or fewer bowel movements a week, for 3 months or longer.  · You may have a higher risk for this condition if you are an older adult, you are female, or you have a long-term disease.  · Treatment for this condition depends on the cause. Most treatments for chronic constipation include adding fiber to your diet, drinking more fluids, and getting more physical activity. You may also need to treat any underlying medical conditions or stop or change certain medicines if they cause constipation.  · If lifestyle changes do not relieve constipation, your health care provider may recommend taking a laxative.  This information is not intended to replace advice given to you by your health care provider. Make sure you discuss any questions you have with your health care provider.  Document Revised: 11/04/2020 Document Reviewed: 11/04/2020  [employer] Patient Education © 2021 [employer]

## 2021-08-30 NOTE — PROGRESS NOTES
Chief Complaint  Hypertension and Weight Loss      Subjective          Amy L Sturgeon presents today for follow-up on weight loss and hypertension and new concern for constipation.    Hypertension  This is a recurrent problem. The current episode started more than 1 month ago. The problem has been gradually improving since onset. Pertinent negatives include no blurred vision, chest pain, headaches, malaise/fatigue or shortness of breath. Risk factors for coronary artery disease include smoking/tobacco exposure. Current antihypertension treatment includes ACE inhibitors. The current treatment provides moderate improvement. There are no compliance problems.    Weight Loss  This is a recurrent problem. The current episode started more than 1 month ago. The problem has been unchanged. Associated symptoms include a change in bowel habit. Pertinent negatives include no chest pain or headaches. Nothing aggravates the symptoms. She has tried nothing for the symptoms. The treatment provided no relief.     Have had significant constipation despite a mostly fruit and vegitable diet.  She has used over-the-counter stool softeners.  She does report a history of irritable bowel syndrome, no history of obstruction.    Home BP checks 112/75 is typical reading.     Current Outpatient Medications on File Prior to Visit   Medication Sig   • albuterol sulfate  (90 Base) MCG/ACT inhaler Inhale 2 puffs Every 4 (Four) Hours As Needed for Wheezing.   • atorvastatin (LIPITOR) 20 MG tablet Take 1 tablet by mouth Every Night.   • cycloSPORINE (RESTASIS) 0.05 % ophthalmic emulsion Apply 1 drop to eye(s) as directed by provider 2 (Two) Times a Day.   • DULoxetine (CYMBALTA) 60 MG capsule Take 1 capsule by mouth Daily.   • Fluticasone-Umeclidin-Vilant (Trelegy Ellipta) 200-62.5-25 MCG/INH aerosol powder  Inhale 1 puff Daily.   • gabapentin (NEURONTIN) 800 MG tablet TAKE 1 TABLET BY MOUTH 3  TIMES DAILY (MAY TAKE UP TO 4 TIMES DAILY IF  "NEEDED)   • hydrOXYzine pamoate (VISTARIL) 25 MG capsule Take 1 capsule by mouth 3 (Three) Times a Day As Needed for Anxiety.   • losartan (COZAAR) 100 MG tablet Take 1 tablet by mouth Daily.   • meclizine (ANTIVERT) 12.5 MG tablet Take 1 tablet by mouth 3 (Three) Times a Day As Needed.   • meloxicam (MOBIC) 15 MG tablet Take 1 tablet by mouth Daily.   • montelukast (Singulair) 10 MG tablet Take 1 tablet by mouth Every Night.   • MYRBETRIQ 50 MG tablet sustained-release 24 hour 24 hr tablet Take 1 tablet by mouth Daily.   • omeprazole (priLOSEC) 40 MG capsule Take 1 capsule by mouth Daily.   • traZODone (DESYREL) 50 MG tablet Take 1 tablet by mouth Every Night.   • [DISCONTINUED] phentermine (ADIPEX-P) 37.5 MG tablet Take 1 tablet by mouth Every Morning Before Breakfast.   • cetirizine (zyrTEC) 10 MG tablet Take 10 mg by mouth Every Morning.   • [DISCONTINUED] gabapentin (NEURONTIN) 800 MG tablet 1 tab tid, increase to qid if needed     No current facility-administered medications on file prior to visit.       Objective   Vital Signs:   /86   Pulse 78   Temp 97.3 °F (36.3 °C)   Ht 165.1 cm (65\")   Wt 103 kg (227 lb 12.8 oz)   SpO2 98%   BMI 37.91 kg/m²   Weight is down an additional 16 pounds in the past 2 months.  Physical Exam  Vitals and nursing note reviewed.   Constitutional:       General: She is not in acute distress.     Appearance: She is well-developed. She is obese.   HENT:      Head: Normocephalic and atraumatic.   Cardiovascular:      Rate and Rhythm: Normal rate and regular rhythm.      Heart sounds: No murmur heard.     Pulmonary:      Effort: Pulmonary effort is normal.      Breath sounds: Normal breath sounds. No wheezing.   Abdominal:      General: Bowel sounds are normal. There is no distension.      Palpations: Abdomen is soft. There is no mass.      Tenderness: There is no abdominal tenderness. There is no guarding or rebound.      Hernia: No hernia is present.   Musculoskeletal:    "      General: Normal range of motion.   Skin:     General: Skin is warm and dry.      Findings: No rash.   Neurological:      Mental Status: She is alert and oriented to person, place, and time.            No visits with results within 1 Day(s) from this visit.   Latest known visit with results is:   Office Visit on 06/01/2021   Component Date Value Ref Range Status   • Vitamin B-12 06/01/2021 393  232 - 1,245 pg/mL Final   • Glucose 06/01/2021 99  65 - 99 mg/dL Final   • BUN 06/01/2021 16  6 - 24 mg/dL Final   • Creatinine 06/01/2021 0.95  0.57 - 1.00 mg/dL Final   • eGFR Non  Am 06/01/2021 70  >59 mL/min/1.73 Final   • eGFR African Am 06/01/2021 81  >59 mL/min/1.73 Final    Comment: **[facility] currently reports eGFR in compliance with the current**    recommendations of the [employer]. [facility] will    update reporting as new guidelines are published from the NKF-ASN    Task force.     • BUN/Creatinine Ratio 06/01/2021 17  9 - 23 Final   • Sodium 06/01/2021 145* 134 - 144 mmol/L Final   • Potassium 06/01/2021 5.4* 3.5 - 5.2 mmol/L Final   • Chloride 06/01/2021 103  96 - 106 mmol/L Final   • Total CO2 06/01/2021 27  20 - 29 mmol/L Final   • Calcium 06/01/2021 10.0  8.7 - 10.2 mg/dL Final   • Total Protein 06/01/2021 7.2  6.0 - 8.5 g/dL Final   • Albumin 06/01/2021 4.5  3.8 - 4.8 g/dL Final   • Globulin 06/01/2021 2.7  1.5 - 4.5 g/dL Final   • A/G Ratio 06/01/2021 1.7  1.2 - 2.2 Final   • Total Bilirubin 06/01/2021 0.5  0.0 - 1.2 mg/dL Final   • Alkaline Phosphatase 06/01/2021 144* 48 - 121 IU/L Final   • AST (SGOT) 06/01/2021 19  0 - 40 IU/L Final   • ALT (SGPT) 06/01/2021 18  0 - 32 IU/L Final             Lab Results   Component Value Date    HGBA1C 6.0 (H) 07/23/2019                Assessment and Plan    Diagnoses and all orders for this visit:    1. Essential hypertension (Primary)    2. Morbid (severe) obesity due to excess calories (CMS/HCC)  -     phentermine (ADIPEX-P) 37.5 MG tablet;  Take 1 tablet by mouth Every Morning Before Breakfast.  Dispense: 30 tablet; Refill: 3    3. Weight loss    4. Constipation, unspecified constipation type  -     polyethylene glycol (MiraLax) 17 GM/SCOOP powder; Take 17 g by mouth Daily.  Dispense: 850 g; Refill: 3    Obesity, significant weight loss utilizing phentermine and dietary changes and increased physical activity.  Encouraged continued efforts at calorie reduction and increasing exercise.    Hypertension, improving with weight loss.  Continue current medications for now, may need to reduce Cozaar in the future if she continues weight loss.    Constipation with history of irritable bowel syndrome.  She reports adequate water intake and more than adequate fiber through diet.  Will add MiraLAX.    Medications Discontinued During This Encounter   Medication Reason   • phentermine (ADIPEX-P) 37.5 MG tablet Reorder         Follow Up     Return in about 3 months (around 11/30/2021) for Annual physical and BP and weight.    Patient was given instructions and counseling regarding her condition or for health maintenance advice. Please see specific information pulled into the AVS if appropriate.

## 2021-09-01 ENCOUNTER — OFFICE VISIT (OUTPATIENT)
Dept: PULMONOLOGY | Facility: HOSPITAL | Age: 51
End: 2021-09-01

## 2021-09-01 VITALS
HEIGHT: 65 IN | OXYGEN SATURATION: 98 % | TEMPERATURE: 97.8 F | SYSTOLIC BLOOD PRESSURE: 137 MMHG | DIASTOLIC BLOOD PRESSURE: 88 MMHG | BODY MASS INDEX: 37.39 KG/M2 | HEART RATE: 67 BPM | RESPIRATION RATE: 13 BRPM | WEIGHT: 224.4 LBS

## 2021-09-01 DIAGNOSIS — F51.01 PRIMARY INSOMNIA: ICD-10-CM

## 2021-09-01 DIAGNOSIS — M79.7 FIBROMYALGIA: ICD-10-CM

## 2021-09-01 DIAGNOSIS — G47.33 OSA TREATED WITH BIPAP: Primary | ICD-10-CM

## 2021-09-01 DIAGNOSIS — K21.9 GASTROESOPHAGEAL REFLUX DISEASE WITHOUT ESOPHAGITIS: ICD-10-CM

## 2021-09-01 DIAGNOSIS — J45.41 MODERATE PERSISTENT ASTHMA WITH ACUTE EXACERBATION: ICD-10-CM

## 2021-09-01 PROBLEM — Z86.59 HISTORY OF ANXIETY STATE: Status: ACTIVE | Noted: 2021-09-01

## 2021-09-01 PROBLEM — Z91.89 HISTORY OF ANXIETY STATE: Status: ACTIVE | Noted: 2021-09-01

## 2021-09-01 PROBLEM — I10 BENIGN ESSENTIAL HYPERTENSION: Status: ACTIVE | Noted: 2021-09-01

## 2021-09-01 PROCEDURE — G0463 HOSPITAL OUTPT CLINIC VISIT: HCPCS

## 2021-09-01 RX ORDER — BUDESONIDE AND FORMOTEROL FUMARATE DIHYDRATE 160; 4.5 UG/1; UG/1
2 AEROSOL RESPIRATORY (INHALATION) 2 TIMES DAILY
Qty: 1 EACH | Refills: 11 | Status: SHIPPED | OUTPATIENT
Start: 2021-09-01 | End: 2021-12-21 | Stop reason: SDUPTHER

## 2021-09-01 NOTE — PROGRESS NOTES
PULMONARY  CONSULT NOTE      PATIENT IDENTIFICATION:  Name: Amy L Sturgeon  Age: 51 y.o.  Sex: female  :  1970  MRN: [id number]    DATE OF CONSULTATION:  2021                     CHIEF COMPLAINT: MARY CARMEN    History of Present Illness:   Amy L Sturgeon is a 51 y.o. female Pt on CPAP feeling better more energy especially the night he use it more than 4 hours, no sleepiness no fatigue no tiredness, no mask irritation no dryness, compliance report reviewed with pt AHI< 5 with good usage. Pt machine on recall and still not getting used to mask      Review of Systems:   Constitutional:  As above   Eyes: negative   ENT/oropharynx: negative   Cardiovascular: negative   Respiratory:  As above   Gastrointestinal: negative   Genitourinary: negative   Neurological: negative   Musculoskeletal: negative   Integument/breast: negative   Endocrine: negative   Allergic/Immunologic: negative     Past Medical History:  Past Medical History:   Diagnosis Date   • Allergic    • Anxiety    • Asthma    • Brain tumor (benign) (CMS/HCC)    • Closed tibial fracture 2019    right    • GERD (gastroesophageal reflux disease)    • H/O bone density study 2008   • H/O mammogram 11/15/2012   • H/O mammogram 2018   • History of EKG 2012   • Hyperlipidemia    • Hypertension    • MARY CARMEN treated with BiPAP 2021   • Pap smear for cervical cancer screening 08/15/2011       Past Surgical History:  Past Surgical History:   Procedure Laterality Date   • CARDIOVASCULAR STRESS TEST  2012   • CHOLECYSTECTOMY  2006   • COLONOSCOPY  2008   • DILATION AND CURETTAGE, DIAGNOSTIC / THERAPEUTIC     • HYSTERECTOMY     • LAPAROSCOPIC TUBAL LIGATION          Family History:  Family History   Problem Relation Age of Onset   • Hypertension Mother    • Breast cancer Mother    • Breast cancer Maternal Grandmother    • Heart disease Maternal Grandmother    • Heart disease Paternal Grandfather    • Heart disease  "Father    • Diabetes Brother         Social History:   Social History     Tobacco Use   • Smoking status: Former Smoker     Packs/day: 0.50     Years: 8.00     Pack years: 4.00     Types: Cigarettes     Start date:      Quit date:      Years since quittin.6   • Smokeless tobacco: Never Used   Substance Use Topics   • Alcohol use: Yes     Alcohol/week: 1.0 standard drinks     Types: 1 Glasses of wine per week     Comment: Social        Allergies:  Allergies   Allergen Reactions   • Iodinated Diagnostic Agents Hives       Home Meds:  (Not in a hospital admission)      Objective:    Vitals Ranges:   Temp:  [97.8 °F (36.6 °C)] 97.8 °F (36.6 °C)  Heart Rate:  [67] 67  Resp:  [13] 13  BP: (137)/(88) 137/88  Body mass index is 37.34 kg/m².     Exam:  General Appearance:  WDWN    HEENT:   without obvious abnormality,  Conjunctiva/corneas clear,  Normal external ear canals, no drainage    Clear orsalmucosa,  Mallampati score 3    Neck:  Supple, symmetrical, trachea midline. No JVD.  Lungs:   Bilateral basal rhonchi bilaterally, respirations unlabored symmetrical wall movement.    Chest wall:  No tenderness or deformity.    Heart:  Regular rate and rhythm, S1 and S2 normal.  Extremities: Trace edema no clubbing or Cyanosis        Data Review:  All labs (24hrs): No results found for this or any previous visit (from the past 24 hour(s)).     Imaging:  Polysomnography 4 or More Parameters With CPAP  Addendum: Positive airway pressure titration STUDY     PATIENT IDENTIFICATION:   Name: Amy L Sturgeon   Age: 50 y.o.   Sex: female   :  1970   MRN: [id number]     DATE OF Study: 3/12/2021    Referring Physician: [unfilled]     WEIGHT: 252   LB       HEIGHT: 65\"       BMI: 41     Reason For study:    Patient with daytime sleepiness  snoring at night sleep study showed  obstructive sleep apnea recommendation to proceed was titration study     Technical description:    this is an overnight polysomnography study " using standardized parameters  including EEG, EOG, EKG, anterior tibialis and chin EMG, blood oxygen  saturation, oral nasal airflow, respiratory area for monitoring with both  abdominal and thoracic gauges and snore monitoring using a standard  titration protocol.      Summary of sleep parameters:   patient went to bed at(9: 34) p.m, woke up with lightt on at(5: 42) a.m  total study time(487) minutes, total sleep time(414) minutes, latency to  sleep onset(1 minute) minutes, latency to REM sleep (355) minutes, sleep  efficiency was  (84) %, sleep stages where(13)% REM sleep , the rest is  non-REM .     Summary of respiratory efforts:   Patient went to sleep starting with CPAP pressure 5 cm H2O, pressure was  titrated to (BiPAP 19/15) centimeter H2O at this pressure patient patient  slept  (51) minutes did not have any apnea hypopnea or snoring or  desaturation.      Summary over the heart rate:   Heart rate was fluctuating between (60) and  (94) bpm.       Summary of periodic leg movement:   Patient had a total of (2) periodic leg movement with arousal and index  (0.3) times per hour.        Impression:   This is a PAP titration study  showing the patient has obstructive sleep  apnea requiring the pressure of (BiPAP 19/15) centimeters H2O to overcome  most apnea hypopnea in the snoring.      Recommendation:   Start the patient on positive airway pressure of (auto BiPAP maximum eye  pressure of 20 maximum a pressure of 10 pressure support of 6) centimeters  H2O follow up with a sleep specialist for further recommendation and  follow-up on compliance, meanwhile avoid supine sleeping, a  voice-activated medication, avoid long-distance driving till all symptoms  resolved.     Alex Mahoney MD. D, ABSM.   4/13/2021   15:15 EDT  Narrative:                                                                                                                              Polysomnography  PATIENT IDENTIFICATION:  Name: Marcela OLIVAS  Sturgeon  Age: 50 y.o.  Sex: female  :  1970  MRN: [id number]    DATE OF Study:  3/12/2021    @BMI@     Reason For study:   Patient with daytime sleepiness  snoring at night , tiredness fatigue   feeling poor quality of sleep  recommendation to proceed  with   polysomnography.    Technical description:   this is an overnight polysomnography study using standardized parameters   including EEG, EOG, EKG, anterior tibialis and chin EMG, blood oxygen   saturation, oral nasal airflow, respiratory area for monitoring with both   abdominal and thoracic gauges and snore monitoring using a standard   titration protocol.     Summary of sleep parameters:  patient went to bed at( 9:34) p.m, woke up with lightt on at(5:42) a.m   total study time(487) minutes, total sleep time(414) minutes, latency to   sleep onset(1) minutes, latency to REM sleep (355) minutes, sleep   efficiency was  (85) %, sleep stages where(13)% REM sleep , the rest is   non-REM .     Summary of respiratory efforts:  Patient had  a total of (119) apnea hypopnea with  AHI(17) times per hour.   longest apnea ( ) seconds,  total RDI was (17) times per hour, Lowest   desaturation was to (79s), .     Summary over the heart rate:  Heart rate was fluctuating between (80) and   (100) bpm.     Summary of periodic leg movement:  Patient had a total of (2) periodic leg movement with arousal and index   (0.3) times per hour.      Impression:  this is a polysomnography study  showing the patient has obstructive sleep   apnea.        Recommendation:   Patient need to follow up with a sleep specialist for further   recommendation and management, avoid supine sleeping, and sedative   medication, avoid long-distance driving till treated and all symptoms   resolved.    Alex Mahoney MD. D, ABSM.  3/21/2021  23:45 EDT        ASSESSMENT:  Diagnoses and all orders for this visit:    MARY CARMEN treated with BiPAP    Primary insomnia    Fibromyalgia    Moderate persistent  asthma with acute exacerbation    Gastroesophageal reflux disease without esophagitis    Other orders  -     budesonide-formoterol (SYMBICORT) 160-4.5 MCG/ACT inhaler; Inhale 2 puffs 2 (Two) Times a Day.        PLAN:  Patient needs to contact her DME to give her a new BiPAP machine  This patient with obstructive sleep apnea, compliance is improved. Encourage to use it more frequent, I re-emphasized on pt the long and short term benefit of treating MARY CARMEN.     Treating MARY CARMEN will improve gastroesophageal reflux symptoms control and improve fibromyalgia pain    Bronchodilator inhaled corticosteroid Symbicort    Education how to use inhalers    Encouraged to use incentive spirometer    Continue to exercise slowly as tolerated    Monitor for any change in the color of the sputum    Avoid any exposure to fumes, gas or any irritant      Follow-up 6 months    Alex Mahoney MD. D, ABSM.  9/1/2021  15:24 EDT

## 2021-09-25 DIAGNOSIS — I10 ESSENTIAL HYPERTENSION: ICD-10-CM

## 2021-09-27 RX ORDER — LOSARTAN POTASSIUM 100 MG/1
100 TABLET ORAL DAILY
Qty: 90 TABLET | Refills: 0 | Status: SHIPPED | OUTPATIENT
Start: 2021-09-27 | End: 2021-11-30 | Stop reason: SDUPTHER

## 2021-09-27 NOTE — TELEPHONE ENCOUNTER
Please inform patient that I am renewing her losartan however her last potassium was above normal and had intended her to repeat this to make sure it returns to normal.  There is an order for a BMP.  Please have her get this lab drawn prior to additional refills.

## 2021-10-03 DIAGNOSIS — M79.10 MYALGIA: ICD-10-CM

## 2021-10-03 DIAGNOSIS — M54.16 LUMBAR BACK PAIN WITH RADICULOPATHY AFFECTING RIGHT LOWER EXTREMITY: ICD-10-CM

## 2021-10-03 DIAGNOSIS — K21.9 GASTROESOPHAGEAL REFLUX DISEASE WITHOUT ESOPHAGITIS: ICD-10-CM

## 2021-10-09 RX ORDER — MELOXICAM 15 MG/1
15 TABLET ORAL DAILY
Qty: 90 TABLET | Refills: 3 | Status: SHIPPED | OUTPATIENT
Start: 2021-10-09 | End: 2022-09-13

## 2021-10-09 RX ORDER — OMEPRAZOLE 40 MG/1
40 CAPSULE, DELAYED RELEASE ORAL DAILY
Qty: 90 CAPSULE | Refills: 3 | Status: SHIPPED | OUTPATIENT
Start: 2021-10-09 | End: 2022-09-13

## 2021-11-30 ENCOUNTER — OFFICE VISIT (OUTPATIENT)
Dept: FAMILY MEDICINE CLINIC | Facility: CLINIC | Age: 51
End: 2021-11-30

## 2021-11-30 VITALS
HEIGHT: 65 IN | HEART RATE: 72 BPM | OXYGEN SATURATION: 97 % | DIASTOLIC BLOOD PRESSURE: 60 MMHG | TEMPERATURE: 97.5 F | RESPIRATION RATE: 18 BRPM | WEIGHT: 205.8 LBS | SYSTOLIC BLOOD PRESSURE: 120 MMHG | BODY MASS INDEX: 34.29 KG/M2

## 2021-11-30 DIAGNOSIS — F51.01 PRIMARY INSOMNIA: ICD-10-CM

## 2021-11-30 DIAGNOSIS — R42 VERTIGO: ICD-10-CM

## 2021-11-30 DIAGNOSIS — R63.4 WEIGHT LOSS: ICD-10-CM

## 2021-11-30 DIAGNOSIS — R26.89 BALANCE PROBLEMS: ICD-10-CM

## 2021-11-30 DIAGNOSIS — I10 ESSENTIAL HYPERTENSION: ICD-10-CM

## 2021-11-30 DIAGNOSIS — Z13.29 SCREENING FOR THYROID DISORDER: ICD-10-CM

## 2021-11-30 DIAGNOSIS — E78.2 MIXED HYPERLIPIDEMIA: ICD-10-CM

## 2021-11-30 DIAGNOSIS — R73.09 ELEVATED HEMOGLOBIN A1C: ICD-10-CM

## 2021-11-30 DIAGNOSIS — E66.01 MORBID (SEVERE) OBESITY DUE TO EXCESS CALORIES (HCC): Primary | ICD-10-CM

## 2021-11-30 PROCEDURE — 99214 OFFICE O/P EST MOD 30 MIN: CPT | Performed by: FAMILY MEDICINE

## 2021-11-30 RX ORDER — PHENTERMINE HYDROCHLORIDE 37.5 MG/1
37.5 TABLET ORAL
Qty: 30 TABLET | Refills: 3 | Status: SHIPPED | OUTPATIENT
Start: 2021-11-30 | End: 2022-06-02

## 2021-11-30 RX ORDER — TRAZODONE HYDROCHLORIDE 50 MG/1
50 TABLET ORAL NIGHTLY
Qty: 90 TABLET | Refills: 3 | Status: SHIPPED | OUTPATIENT
Start: 2021-11-30 | End: 2022-12-03

## 2021-11-30 RX ORDER — LOSARTAN POTASSIUM 100 MG/1
50 TABLET ORAL DAILY
Qty: 90 TABLET | Refills: 0
Start: 2021-11-30 | End: 2021-12-21 | Stop reason: SDUPTHER

## 2021-12-19 LAB
ALBUMIN SERPL-MCNC: 3.9 G/DL (ref 3.8–4.8)
ALBUMIN/GLOB SERPL: 1.7 {RATIO} (ref 1.2–2.2)
ALP SERPL-CCNC: 82 IU/L (ref 44–121)
ALT SERPL-CCNC: 9 IU/L (ref 0–32)
AST SERPL-CCNC: 10 IU/L (ref 0–40)
BASOPHILS # BLD AUTO: 0.1 X10E3/UL (ref 0–0.2)
BASOPHILS NFR BLD AUTO: 1 %
BILIRUB SERPL-MCNC: 0.4 MG/DL (ref 0–1.2)
BUN SERPL-MCNC: 18 MG/DL (ref 6–24)
BUN/CREAT SERPL: 21 (ref 9–23)
CALCIUM SERPL-MCNC: 9.2 MG/DL (ref 8.7–10.2)
CHLORIDE SERPL-SCNC: 102 MMOL/L (ref 96–106)
CHOLEST SERPL-MCNC: 133 MG/DL (ref 100–199)
CO2 SERPL-SCNC: 26 MMOL/L (ref 20–29)
CREAT SERPL-MCNC: 0.85 MG/DL (ref 0.57–1)
EOSINOPHIL # BLD AUTO: 0.1 X10E3/UL (ref 0–0.4)
EOSINOPHIL NFR BLD AUTO: 1 %
ERYTHROCYTE [DISTWIDTH] IN BLOOD BY AUTOMATED COUNT: 13.1 % (ref 11.7–15.4)
GLOBULIN SER CALC-MCNC: 2.3 G/DL (ref 1.5–4.5)
GLUCOSE SERPL-MCNC: 82 MG/DL (ref 65–99)
HBA1C MFR BLD: 5.3 % (ref 4.8–5.6)
HCT VFR BLD AUTO: 43 % (ref 34–46.6)
HDLC SERPL-MCNC: 56 MG/DL
HGB BLD-MCNC: 14.1 G/DL (ref 11.1–15.9)
IMM GRANULOCYTES # BLD AUTO: 0.1 X10E3/UL (ref 0–0.1)
IMM GRANULOCYTES NFR BLD AUTO: 1 %
LDLC SERPL CALC-MCNC: 60 MG/DL (ref 0–99)
LYMPHOCYTES # BLD AUTO: 3.8 X10E3/UL (ref 0.7–3.1)
LYMPHOCYTES NFR BLD AUTO: 35 %
MCH RBC QN AUTO: 28.7 PG (ref 26.6–33)
MCHC RBC AUTO-ENTMCNC: 32.8 G/DL (ref 31.5–35.7)
MCV RBC AUTO: 87 FL (ref 79–97)
MONOCYTES # BLD AUTO: 0.9 X10E3/UL (ref 0.1–0.9)
MONOCYTES NFR BLD AUTO: 8 %
NEUTROPHILS # BLD AUTO: 5.8 X10E3/UL (ref 1.4–7)
NEUTROPHILS NFR BLD AUTO: 54 %
PLATELET # BLD AUTO: 309 X10E3/UL (ref 150–450)
POTASSIUM SERPL-SCNC: 3.9 MMOL/L (ref 3.5–5.2)
PROT SERPL-MCNC: 6.2 G/DL (ref 6–8.5)
RBC # BLD AUTO: 4.92 X10E6/UL (ref 3.77–5.28)
SODIUM SERPL-SCNC: 141 MMOL/L (ref 134–144)
TRIGL SERPL-MCNC: 93 MG/DL (ref 0–149)
TSH SERPL DL<=0.005 MIU/L-ACNC: 1.57 UIU/ML (ref 0.45–4.5)
VLDLC SERPL CALC-MCNC: 17 MG/DL (ref 5–40)
WBC # BLD AUTO: 10.7 X10E3/UL (ref 3.4–10.8)

## 2021-12-21 ENCOUNTER — OFFICE VISIT (OUTPATIENT)
Dept: FAMILY MEDICINE CLINIC | Facility: CLINIC | Age: 51
End: 2021-12-21

## 2021-12-21 VITALS
HEART RATE: 104 BPM | WEIGHT: 203.6 LBS | DIASTOLIC BLOOD PRESSURE: 68 MMHG | TEMPERATURE: 96.6 F | RESPIRATION RATE: 18 BRPM | OXYGEN SATURATION: 98 % | BODY MASS INDEX: 33.92 KG/M2 | SYSTOLIC BLOOD PRESSURE: 112 MMHG | HEIGHT: 65 IN

## 2021-12-21 DIAGNOSIS — J45.40 MODERATE PERSISTENT ASTHMA, UNSPECIFIED WHETHER COMPLICATED: ICD-10-CM

## 2021-12-21 DIAGNOSIS — E66.01 MORBID (SEVERE) OBESITY DUE TO EXCESS CALORIES (HCC): ICD-10-CM

## 2021-12-21 DIAGNOSIS — I10 ESSENTIAL HYPERTENSION: ICD-10-CM

## 2021-12-21 DIAGNOSIS — J30.9 ALLERGIC RHINITIS, UNSPECIFIED SEASONALITY, UNSPECIFIED TRIGGER: ICD-10-CM

## 2021-12-21 DIAGNOSIS — Z00.00 MEDICARE ANNUAL WELLNESS VISIT, INITIAL: Primary | ICD-10-CM

## 2021-12-21 DIAGNOSIS — Z13.31 DEPRESSION SCREENING: ICD-10-CM

## 2021-12-21 DIAGNOSIS — Z12.31 ENCOUNTER FOR SCREENING MAMMOGRAM FOR BREAST CANCER: ICD-10-CM

## 2021-12-21 DIAGNOSIS — E78.2 MIXED HYPERLIPIDEMIA: ICD-10-CM

## 2021-12-21 PROBLEM — M13.80 SERONEGATIVE ARTHRITIS: Status: ACTIVE | Noted: 2021-12-21

## 2021-12-21 PROCEDURE — 1160F RVW MEDS BY RX/DR IN RCRD: CPT | Performed by: FAMILY MEDICINE

## 2021-12-21 PROCEDURE — 99214 OFFICE O/P EST MOD 30 MIN: CPT | Performed by: FAMILY MEDICINE

## 2021-12-21 PROCEDURE — 1170F FXNL STATUS ASSESSED: CPT | Performed by: FAMILY MEDICINE

## 2021-12-21 PROCEDURE — 1126F AMNT PAIN NOTED NONE PRSNT: CPT | Performed by: FAMILY MEDICINE

## 2021-12-21 PROCEDURE — G0438 PPPS, INITIAL VISIT: HCPCS | Performed by: FAMILY MEDICINE

## 2021-12-21 RX ORDER — DEXAMETHASONE 1 MG
TABLET ORAL
COMMUNITY
Start: 2021-12-06 | End: 2022-05-16

## 2021-12-21 RX ORDER — LOSARTAN POTASSIUM 25 MG/1
25 TABLET ORAL DAILY
Qty: 90 TABLET | Refills: 3 | Status: SHIPPED | OUTPATIENT
Start: 2021-12-21 | End: 2022-10-24

## 2021-12-21 RX ORDER — OMEGA-3S/DHA/EPA/FISH OIL/D3 300MG-1000
CAPSULE ORAL
COMMUNITY
End: 2022-07-05

## 2021-12-21 RX ORDER — MONTELUKAST SODIUM 10 MG/1
10 TABLET ORAL NIGHTLY
Qty: 90 TABLET | Refills: 3 | Status: SHIPPED | OUTPATIENT
Start: 2021-12-21 | End: 2022-12-02

## 2021-12-21 RX ORDER — ASPIRIN 81 MG/81MG
CAPSULE ORAL
COMMUNITY
End: 2021-12-21

## 2021-12-21 RX ORDER — CETIRIZINE HYDROCHLORIDE 10 MG/1
10 TABLET ORAL DAILY
Qty: 30 TABLET | Refills: 12
Start: 2021-12-21 | End: 2022-05-16 | Stop reason: SINTOL

## 2021-12-21 RX ORDER — ATORVASTATIN CALCIUM 10 MG/1
10 TABLET, FILM COATED ORAL NIGHTLY
Qty: 90 TABLET | Refills: 3 | Status: SHIPPED | OUTPATIENT
Start: 2021-12-21 | End: 2022-10-28

## 2021-12-21 NOTE — PROGRESS NOTES
The ABCs of the Annual Wellness Visit  Initial Medicare Wellness Visit    Chief Complaint   Patient presents with   • Medicare Wellness-Initial Visit     Subjective   History of Present Illness:  Amy L Sturgeon is a 51 y.o. female who presents for an Initial Medicare Wellness Visit.  Additionally she brings in home BP readings from December 1 through December 20, ranging from 78 to 87/105 to 123.    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     Compared to one year ago, the patient feels her physical   health is the same.    Compared to one year ago, the patient feels her mental   health is the same.    Recent Hospitalizations:  She was not admitted to the hospital during the last year.       Current Medical Providers:  Patient Care Team:  Rosa Elena Turcios MD as PCP - General (Family Medicine)  Mariluz Avila as Technologist    Outpatient Medications Prior to Visit   Medication Sig Dispense Refill   • albuterol sulfate  (90 Base) MCG/ACT inhaler Inhale 2 puffs Every 4 (Four) Hours As Needed for Wheezing. 6.7 g 5   • cholecalciferol (VITAMIN D3) 10 MCG (400 UNIT) tablet Take  by mouth.     • dexamethasone (DECADRON) 1 MG tablet 4mgPO QAMx7, then 3mgPO QAMx7, then 2mgPO QAMx7, then 1mgPO QAMx7, then STOP.     • DULoxetine (CYMBALTA) 60 MG capsule Take 1 capsule by mouth Daily. 90 capsule 3   • hydrOXYzine pamoate (VISTARIL) 25 MG capsule Take 1 capsule by mouth 3 (Three) Times a Day As Needed for Anxiety. 30 capsule 1   • meclizine (ANTIVERT) 12.5 MG tablet Take 1 tablet by mouth 3 (Three) Times a Day As Needed.  3   • meloxicam (MOBIC) 15 MG tablet Take 1 tablet by mouth Daily. 90 tablet 3   • omeprazole  (priLOSEC) 40 MG capsule TAKE 1 CAPSULE BY MOUTH  DAILY 90 capsule 3   • phentermine (ADIPEX-P) 37.5 MG tablet Take 1 tablet by mouth Every Morning Before Breakfast. 30 tablet 3   • polyethylene glycol (MiraLax) 17 GM/SCOOP powder Take 17 g by mouth Daily. 850 g 3   • traZODone (DESYREL) 50 MG tablet Take 1 tablet by mouth Every Night. 90 tablet 3   • Aspirin ([place]) 81 MG capsule Take  by mouth.     • atorvastatin (LIPITOR) 20 MG tablet Take 1 tablet by mouth Every Night. 90 tablet 3   • losartan (COZAAR) 100 MG tablet Take 0.5 tablets by mouth Daily. 90 tablet 0   • montelukast (Singulair) 10 MG tablet Take 1 tablet by mouth Every Night. 90 tablet 3   • budesonide-formoterol (SYMBICORT) 160-4.5 MCG/ACT inhaler Inhale 2 puffs 2 (Two) Times a Day. 1 each 11   • cycloSPORINE (RESTASIS) 0.05 % ophthalmic emulsion Apply 1 drop to eye(s) as directed by provider 2 (Two) Times a Day.       No facility-administered medications prior to visit.       No opioid medication identified on active medication list. I have reviewed chart for other potential  high risk medication/s and harmful drug interactions in the elderly.          Aspirin is on active medication list.  Aspirin use is not indicated based on review of current medical condition/s. Risk of harm outweighs potential benefits.  Advised to discontinue.    Patient Active Problem List   Diagnosis   • Right tibial fracture   • Ulcerative colitis ([facility])   • Low back pain   • Irritable bowel syndrome   • Allergic rhinitis   • Sphincter of Oddi spasm   • Anxiety   • Depression   • Asthma   • Systemic lupus erythematosus arthritis ([facility])   • Fibromyalgia   • Fatigue   • Vestibular schwannoma ([facility])   • Hyperlipidemia   • Balance problems   • Generalized anxiety disorder   • Hypertension   • Insomnia   • Sensorineural hearing loss   • GERD (gastroesophageal reflux disease)   • Onychomycosis   • Vertigo   • Class 3 severe obesity in adult ([facility])   • B12 deficiency   • MARY CARMEN treated  "with BiPAP   • Hyperkalemia   • Weight loss   • Fibromyositis   • History of anxiety state   • Benign essential hypertension   • Seronegative arthritis     Advance Care Planning  Advance Directive is not on file.  ACP discussion was held with the patient during this visit. Patient does not have an advance directive, information provided.          Objective       Vitals:    21 1510   BP: 112/68   Pulse: 104   Resp: 18   Temp: 96.6 °F (35.9 °C)   SpO2: 98%   Weight: 92.4 kg (203 lb 9.6 oz)   Height: 165.1 cm (65\")   PainSc: 0-No pain     BMI Readings from Last 1 Encounters:   21 33.88 kg/m²   BMI is above normal parameters. Recommendations include: exercise counseling and nutrition counseling she has also been prescribed phentermine and doing well on a weight loss medication    Does the patient have evidence of cognitive impairment? No  ATTENTION  What is the year: correct  What is the month of the year: correct  What is the day of the week?: correct  What is the date?: correct  MEMORY  Repeat address three times, only score third attempt: Fam Marroquin [address] [place] [address]: 6  HOW MANY ANIMALS DID THE PATIENT NAME  Verbal Fluency -- Animal Names (0-25): 22+  CLOCK DRAWING  Clock Drawing: All Correct  MEMORY RECALL  Tell me what you remember about that name and address we were repeating at the beginnin  ACE TOTAL SCORE  Total ACE Score - <25/30 strongly suggests cognitive impairment; <21/30 almost certainly shows dementia: 28      Physical Exam  Vitals and nursing note reviewed.   Constitutional:       General: She is not in acute distress.     Appearance: She is well-developed. She is obese.   HENT:      Head: Normocephalic and atraumatic.   Cardiovascular:      Rate and Rhythm: Normal rate and regular rhythm.      Heart sounds: No murmur heard.      Pulmonary:      Effort: Pulmonary effort is normal.      Breath sounds: Normal breath sounds. No wheezing.   Abdominal:      General: " Bowel sounds are normal. There is no distension.      Palpations: Abdomen is soft. There is no mass.      Tenderness: There is no abdominal tenderness. There is no guarding or rebound.      Hernia: No hernia is present.   Musculoskeletal:         General: Normal range of motion.   Skin:     General: Skin is warm and dry.      Findings: No rash.   Neurological:      Mental Status: She is alert and oriented to person, place, and time.   Psychiatric:         Mood and Affect: Mood normal.         Behavior: Behavior normal.         Thought Content: Thought content normal.         Judgment: Judgment normal.       Lab Results   Component Value Date    CHLPL 133 2021    TRIG 93 2021    HDL 56 2021    LDL 60 2021    VLDL 17 2021    HGBA1C 5.3 2021          HEALTH RISK ASSESSMENT    Smoking Status:  Social History     Tobacco Use   Smoking Status Former Smoker   • Packs/day: 0.50   • Years: 8.00   • Pack years: 4.00   • Types: Cigarettes   • Start date:    • Quit date:    • Years since quittin.9   Smokeless Tobacco Never Used     Alcohol Consumption:  Social History     Substance and Sexual Activity   Alcohol Use Yes   • Alcohol/week: 1.0 standard drink   • Types: 1 Glasses of wine per week    Comment: Social     Fall Risk Screen:    YUMIKOADI Fall Risk Assessment was completed, and patient is at MODERATE risk for falls. Assessment completed on:2021    Depression Screen:   PHQ-2/PHQ-9 Depression Screening 2021   Little interest or pleasure in doing things 0   Feeling down, depressed, or hopeless 0   Trouble falling or staying asleep, or sleeping too much 0   Feeling tired or having little energy 0   Poor appetite or overeating 0   Feeling bad about yourself - or that you are a failure or have let yourself or your family down 0   Trouble concentrating on things, such as reading the newspaper or watching television 0   Moving or speaking so slowly that other people could  have noticed. Or the opposite - being so fidgety or restless that you have been moving around a lot more than usual 0   Thoughts that you would be better off dead, or of hurting yourself in some way 0   Total Score 0   If you checked off any problems, how difficult have these problems made it for you to do your work, take care of things at home, or get along with other people? Not difficult at all       Health Habits and Functional and Cognitive Screening:  Functional & Cognitive Status 12/21/2021   Do you have difficulty preparing food and eating? No   Do you have difficulty bathing yourself, getting dressed or grooming yourself? No   Do you have difficulty using the toilet? No   Do you have difficulty moving around from place to place? No   Do you have trouble with steps or getting out of a bed or a chair? No   Current Diet Well Balanced Diet   Dental Exam Up to date   Eye Exam Up to date   Exercise (times per week) 5 times per week   Current Exercises Include Walking;House Cleaning   Do you need help using the phone?  No   Are you deaf or do you have serious difficulty hearing?  No   Do you need help with transportation? No   Do you need help shopping? No   Do you need help preparing meals?  No   Do you need help with housework?  No   Do you need help with laundry? No   Do you need help taking your medications? No   Do you need help managing money? No   Do you ever drive or ride in a car without wearing a seat belt? No   Have you felt unusual stress, anger or loneliness in the last month? Yes   Who do you live with? Spouse   If you need help, do you have trouble finding someone available to you? No   Have you been bothered in the last four weeks by sexual problems? No   Do you have difficulty concentrating, remembering or making decisions? No       Age-appropriate Screening Schedule:  Refer to the list below for future screening recommendations based on patient's age, sex and/or medical conditions. Orders for  these recommended tests are listed in the plan section. The patient has been provided with a written plan.    Health Maintenance   Topic Date Due   • TDAP/TD VACCINES (1 - Tdap) Never done   • ZOSTER VACCINE (1 of 2) Never done   • PAP SMEAR  07/23/2022   • MAMMOGRAM  12/07/2022   • LIPID PANEL  12/18/2022   • INFLUENZA VACCINE  Completed            Assessment/Plan   CMS Preventative Services Quick Reference  Risk Factors Identified During Encounter  Immunizations Discussed/Encouraged (specific Immunizations; Tdap and Shingrix  Obesity/Overweight   acustic neuroma     The above risks/problems have been discussed with the patient.  Follow up actions/plans if indicated are seen below in the Assessment/Plan Section.  Pertinent information has been shared with the patient in the After Visit Summary.    Diagnoses and all orders for this visit:    1. Medicare annual wellness visit, initial (Primary)    2. Morbid (severe) obesity due to excess calories (HCC)    3. Depression screening    4. Essential hypertension  -     losartan (COZAAR) 25 MG tablet; Take 1 tablet by mouth Daily.  Dispense: 90 tablet; Refill: 3    5. Mixed hyperlipidemia  -     atorvastatin (LIPITOR) 10 MG tablet; Take 1 tablet by mouth Every Night.  Dispense: 90 tablet; Refill: 3    6. Moderate persistent asthma, unspecified whether complicated  -     montelukast (Singulair) 10 MG tablet; Take 1 tablet by mouth Every Night.  Dispense: 90 tablet; Refill: 3    7. Allergic rhinitis, unspecified seasonality, unspecified trigger    8. Encounter for screening mammogram for breast cancer  -     Mammo Screening Digital Tomosynthesis Bilateral With CAD; Future    Other orders  -     cetirizine (zyrTEC) 10 MG tablet; Take 1 tablet by mouth Daily.  Dispense: 30 tablet; Refill: 12    The patient was counseled regarding nutrition, physical activity, healthy weight, injury prevention, immunizations and preventative health screenings.  Do recommend getting the tetanus  and shingles vaccination    Hypertension with fairly frequent low readings, uncertain if this is contributing to her dizziness but with persistent vertigo from acoustic neuroma reducing losartan from 50 to 25 mg daily.    Allergic rhinitis renewing Singulair and she is going to continue Zyrtec 10 mg at night.    Hyperlipidemia, cholesterol readings very low, we will reduce Lipitor from 20 to 10 mg daily    Ordered mammogram at [facility]    Approximately 2 minutes was used to review PHQ and screen for depression.    Follow Up:  Return for htn.     An After Visit Summary and PPPS were made available to the patient.

## 2021-12-23 DIAGNOSIS — I10 ESSENTIAL HYPERTENSION: Primary | ICD-10-CM

## 2021-12-27 RX ORDER — LOSARTAN POTASSIUM 100 MG/1
TABLET ORAL
Qty: 90 TABLET | Refills: 3 | OUTPATIENT
Start: 2021-12-27

## 2022-01-07 ENCOUNTER — APPOINTMENT (OUTPATIENT)
Dept: MAMMOGRAPHY | Facility: HOSPITAL | Age: 52
End: 2022-01-07

## 2022-05-16 ENCOUNTER — OFFICE VISIT (OUTPATIENT)
Dept: FAMILY MEDICINE CLINIC | Facility: CLINIC | Age: 52
End: 2022-05-16

## 2022-05-16 VITALS
WEIGHT: 196.4 LBS | OXYGEN SATURATION: 99 % | TEMPERATURE: 97.3 F | HEIGHT: 65 IN | RESPIRATION RATE: 16 BRPM | DIASTOLIC BLOOD PRESSURE: 84 MMHG | SYSTOLIC BLOOD PRESSURE: 122 MMHG | HEART RATE: 76 BPM | BODY MASS INDEX: 32.72 KG/M2

## 2022-05-16 DIAGNOSIS — R42 VERTIGO: ICD-10-CM

## 2022-05-16 DIAGNOSIS — D33.3 VESTIBULAR SCHWANNOMA: ICD-10-CM

## 2022-05-16 DIAGNOSIS — F41.9 ANXIETY: ICD-10-CM

## 2022-05-16 DIAGNOSIS — M32.9 SYSTEMIC LUPUS ERYTHEMATOSUS ARTHRITIS: ICD-10-CM

## 2022-05-16 DIAGNOSIS — H04.129 DRY EYE: ICD-10-CM

## 2022-05-16 DIAGNOSIS — T44.0X5A: Primary | ICD-10-CM

## 2022-05-16 DIAGNOSIS — R26.89 BALANCE PROBLEMS: ICD-10-CM

## 2022-05-16 PROBLEM — M81.0 OSTEOPOROSIS: Status: ACTIVE | Noted: 2022-01-20

## 2022-05-16 PROBLEM — H90.42 SENSORINEURAL HEARING LOSS (SNHL) OF LEFT EAR WITH UNRESTRICTED HEARING OF RIGHT EAR: Status: ACTIVE | Noted: 2022-01-24

## 2022-05-16 PROBLEM — IMO0002 LUPUS: Status: ACTIVE | Noted: 2022-01-20

## 2022-05-16 PROCEDURE — 99214 OFFICE O/P EST MOD 30 MIN: CPT | Performed by: FAMILY MEDICINE

## 2022-05-16 RX ORDER — LOTEPREDNOL ETABONATE 5 MG/G
1 GEL OPHTHALMIC 4 TIMES DAILY
Qty: 5 G | Refills: 2 | Status: SHIPPED | OUTPATIENT
Start: 2022-05-16 | End: 2022-07-05

## 2022-05-16 RX ORDER — GLYCOPYRROLATE 1 MG/1
1 TABLET ORAL DAILY
COMMUNITY
Start: 2022-02-07 | End: 2022-10-10

## 2022-05-16 RX ORDER — GABAPENTIN 800 MG/1
800 TABLET ORAL 4 TIMES DAILY
COMMUNITY
End: 2022-07-01

## 2022-05-16 RX ORDER — ALBUTEROL SULFATE 90 UG/1
2 AEROSOL, METERED RESPIRATORY (INHALATION) EVERY 4 HOURS PRN
Qty: 24 G | Refills: 3 | Status: SHIPPED | OUTPATIENT
Start: 2022-05-16

## 2022-05-16 RX ORDER — HYDROXYZINE PAMOATE 25 MG/1
25 CAPSULE ORAL 3 TIMES DAILY PRN
Qty: 30 CAPSULE | Refills: 1 | Status: SHIPPED | OUTPATIENT
Start: 2022-05-16 | End: 2022-06-21

## 2022-06-01 ENCOUNTER — HOSPITAL ENCOUNTER (OUTPATIENT)
Dept: MAMMOGRAPHY | Facility: HOSPITAL | Age: 52
Discharge: HOME | End: 2022-06-01
Admitting: FAMILY MEDICINE

## 2022-06-01 DIAGNOSIS — Z12.31 ENCOUNTER FOR SCREENING MAMMOGRAM FOR BREAST CANCER: ICD-10-CM

## 2022-06-01 DIAGNOSIS — E66.01 MORBID (SEVERE) OBESITY DUE TO EXCESS CALORIES: ICD-10-CM

## 2022-06-01 PROCEDURE — 77067 SCR MAMMO BI INCL CAD: CPT

## 2022-06-01 PROCEDURE — 77063 BREAST TOMOSYNTHESIS BI: CPT

## 2022-06-01 NOTE — TELEPHONE ENCOUNTER
Patient states she is taking this medication and did understand she would need an appointment for any refills after this.

## 2022-06-02 RX ORDER — PHENTERMINE HYDROCHLORIDE 37.5 MG/1
TABLET ORAL
Qty: 30 TABLET | Refills: 0 | Status: SHIPPED | OUTPATIENT
Start: 2022-06-02 | End: 2022-07-05 | Stop reason: SDUPTHER

## 2022-06-17 DIAGNOSIS — F41.9 ANXIETY: ICD-10-CM

## 2022-06-21 RX ORDER — HYDROXYZINE PAMOATE 25 MG/1
CAPSULE ORAL
Qty: 30 CAPSULE | Refills: 0 | Status: SHIPPED | OUTPATIENT
Start: 2022-06-21

## 2022-06-21 NOTE — TELEPHONE ENCOUNTER
Patient needs appointment prior to additional refills (as per previous request on phentermine renewed June 2)

## 2022-07-01 RX ORDER — GABAPENTIN 800 MG/1
TABLET ORAL
Qty: 360 TABLET | Refills: 3 | Status: SHIPPED | OUTPATIENT
Start: 2022-07-01

## 2022-07-05 ENCOUNTER — OFFICE VISIT (OUTPATIENT)
Dept: FAMILY MEDICINE CLINIC | Facility: CLINIC | Age: 52
End: 2022-07-05

## 2022-07-05 VITALS
HEART RATE: 81 BPM | RESPIRATION RATE: 16 BRPM | OXYGEN SATURATION: 97 % | DIASTOLIC BLOOD PRESSURE: 80 MMHG | HEIGHT: 65 IN | TEMPERATURE: 98.4 F | WEIGHT: 192.2 LBS | BODY MASS INDEX: 32.02 KG/M2 | SYSTOLIC BLOOD PRESSURE: 110 MMHG

## 2022-07-05 DIAGNOSIS — Z87.891 HISTORY OF TOBACCO USE: ICD-10-CM

## 2022-07-05 DIAGNOSIS — F43.21 ADJUSTMENT DISORDER WITH DEPRESSED MOOD: ICD-10-CM

## 2022-07-05 DIAGNOSIS — H04.129 DRY EYE: ICD-10-CM

## 2022-07-05 DIAGNOSIS — E66.01 MORBID (SEVERE) OBESITY DUE TO EXCESS CALORIES: ICD-10-CM

## 2022-07-05 DIAGNOSIS — T44.0X5A: ICD-10-CM

## 2022-07-05 DIAGNOSIS — E66.9 OBESITY (BMI 30.0-34.9): Primary | ICD-10-CM

## 2022-07-05 PROBLEM — E66.811 OBESITY (BMI 30.0-34.9): Status: ACTIVE | Noted: 2022-07-05

## 2022-07-05 PROCEDURE — 99214 OFFICE O/P EST MOD 30 MIN: CPT | Performed by: FAMILY MEDICINE

## 2022-07-05 RX ORDER — PHENTERMINE HYDROCHLORIDE 37.5 MG/1
37.5 TABLET ORAL
Qty: 30 TABLET | Refills: 3 | Status: SHIPPED | OUTPATIENT
Start: 2022-07-05 | End: 2022-11-16 | Stop reason: SDUPTHER

## 2022-07-05 NOTE — ASSESSMENT & PLAN NOTE
Patient's (Body mass index is 31.98 kg/m².) indicates that they are obese (BMI >30) with health conditions that include none . Weight is improving with treatment. BMI is is above average; BMI management plan is completed. We discussed low calorie, low carb based diet program, portion control and increasing exercise.

## 2022-07-05 NOTE — PROGRESS NOTES
Answers for HPI/ROS submitted by the patient on 6/28/2022  Please describe your symptoms.: Medicine check up  Have you had these symptoms before?: Yes  How long have you been having these symptoms?: Greater than 2 weeks  What is the primary reason for your visit?: Other    Chief Complaint  Obesity     History of Present Illness  Amy L Sturgeon presents today for follow up on weight management. She is currently taking phentermine with good response. She has not been exercising but has been following a low calorie/low sugar diet.   Home scale has a 7 # weight loss.     Marcela was last seen in office on 05/16/2022 for dry eyes. Glycopyrrolate was decreased at that visit. Marcela reports that her eyes are much better. Phentermine did not increase dry eye symptoms.     Feeling a little down. Have recently moved to [place], have changed jobs/ schools again.      Current Outpatient Medications on File Prior to Visit   Medication Sig   • albuterol sulfate  (90 Base) MCG/ACT inhaler Inhale 2 puffs Every 4 (Four) Hours As Needed for Wheezing.   • atorvastatin (LIPITOR) 10 MG tablet Take 1 tablet by mouth Every Night.   • DULoxetine (CYMBALTA) 60 MG capsule Take 1 capsule by mouth Daily.   • gabapentin (NEURONTIN) 800 MG tablet TAKE 1 TABLET BY MOUTH 3  TIMES DAILY (MAY TAKE UP TO 4 TIMES DAILY IF NEEDED)   • glycopyrrolate (ROBINUL) 1 MG tablet Take 1 mg by mouth Daily.   • hydrOXYzine pamoate (VISTARIL) 25 MG capsule TAKE 1 CAPSULE BY MOUTH THREE TIMES DAILY AS NEEDED FOR ANXIETY   • losartan (COZAAR) 25 MG tablet Take 1 tablet by mouth Daily.   • meloxicam (MOBIC) 15 MG tablet Take 1 tablet by mouth Daily.   • montelukast (Singulair) 10 MG tablet Take 1 tablet by mouth Every Night.   • omeprazole (priLOSEC) 40 MG capsule TAKE 1 CAPSULE BY MOUTH  DAILY   • polyethylene glycol (MiraLax) 17 GM/SCOOP powder Take 17 g by mouth Daily.   • traZODone (DESYREL) 50 MG tablet Take 1 tablet by mouth Every Night.   • [DISCONTINUED]  "phentermine (ADIPEX-P) 37.5 MG tablet TAKE 1 TABLET BY MOUTH EVERY DAY BEFORE BREAKFAST   • loteprednol etabonate (LOTEMAX) 0.5 % gel ophthalmic gel Apply 1 drop to eye(s) as directed by provider 4 (Four) Times a Day.   • [DISCONTINUED] cholecalciferol (VITAMIN D3) 10 MCG (400 UNIT) tablet Take  by mouth.   • [DISCONTINUED] Cholecalciferol 50 MCG (2000 UT) tablet Daily.     No current facility-administered medications on file prior to visit.       Objective   Vital Signs:   /80 (BP Location: Right arm, Patient Position: Sitting, Cuff Size: Adult)   Pulse 81   Temp 98.4 °F (36.9 °C) (Temporal)   Resp 16   Ht 165.1 cm (65\")   Wt 87.2 kg (192 lb 3.2 oz)   SpO2 97% Comment: room air  BMI 31.98 kg/m²       Physical Exam  Vitals and nursing note reviewed.   Constitutional:       General: She is not in acute distress.     Appearance: She is well-developed.   HENT:      Head: Normocephalic and atraumatic.   Cardiovascular:      Rate and Rhythm: Normal rate and regular rhythm.      Heart sounds: No murmur heard.  Pulmonary:      Effort: Pulmonary effort is normal.      Breath sounds: Normal breath sounds. No wheezing.   Musculoskeletal:         General: Normal range of motion.   Skin:     General: Skin is warm and dry.      Findings: No rash.   Neurological:      Mental Status: She is alert and oriented to person, place, and time.   Psychiatric:         Mood and Affect: Mood normal.         Behavior: Behavior normal.         Thought Content: Thought content normal.            No visits with results within 1 Day(s) from this visit.   Latest known visit with results is:   Office Visit on 11/30/2021   Component Date Value Ref Range Status   • Hemoglobin A1C 12/18/2021 5.3  4.8 - 5.6 % Final    Comment:          Prediabetes: 5.7 - 6.4           Diabetes: >6.4           Glycemic control for adults with diabetes: <7.0     • Glucose 12/18/2021 82  65 - 99 mg/dL Final   • BUN 12/18/2021 18  6 - 24 mg/dL Final   • " Creatinine 12/18/2021 0.85  0.57 - 1.00 mg/dL Final   • eGFR Non  Am 12/18/2021 80  >59 mL/min/1.73 Final   • eGFR African Am 12/18/2021 92  >59 mL/min/1.73 Final    Comment: **In accordance with recommendations from the NKF-ASN Task force,**    [facility] the [facility] updating [facility] eGFR calculation to the    2021 CKD-EPI creatinine equation that estimates kidney function    without a race variable.     • BUN/Creatinine Ratio 12/18/2021 21  9 - 23 Final   • Sodium 12/18/2021 141  134 - 144 mmol/L Final   • Potassium 12/18/2021 3.9  3.5 - 5.2 mmol/L Final   • Chloride 12/18/2021 102  96 - 106 mmol/L Final   • Total CO2 12/18/2021 26  20 - 29 mmol/L Final   • Calcium 12/18/2021 9.2  8.7 - 10.2 mg/dL Final   • Total Protein 12/18/2021 6.2  6.0 - 8.5 g/dL Final   • Albumin 12/18/2021 3.9  3.8 - 4.8 g/dL Final   • Globulin 12/18/2021 2.3  1.5 - 4.5 g/dL Final   • A/G Ratio 12/18/2021 1.7  1.2 - 2.2 Final   • Total Bilirubin 12/18/2021 0.4  0.0 - 1.2 mg/dL Final   • Alkaline Phosphatase 12/18/2021 82  44 - 121 IU/L Final                  **Please note reference interval change**   • AST (SGOT) 12/18/2021 10  0 - 40 IU/L Final   • ALT (SGPT) 12/18/2021 9  0 - 32 IU/L Final   • Total Cholesterol 12/18/2021 133  100 - 199 mg/dL Final   • Triglycerides 12/18/2021 93  0 - 149 mg/dL Final   • HDL Cholesterol 12/18/2021 56  >39 mg/dL Final   • VLDL Cholesterol [place] 12/18/2021 17  5 - 40 mg/dL Final   • LDL Chol Calc (NIH) 12/18/2021 60  0 - 99 mg/dL Final   • WBC 12/18/2021 10.7  3.4 - 10.8 x10E3/uL Final   • RBC 12/18/2021 4.92  3.77 - 5.28 x10E6/uL Final   • Hemoglobin 12/18/2021 14.1  11.1 - 15.9 g/dL Final   • Hematocrit 12/18/2021 43.0  34.0 - 46.6 % Final   • MCV 12/18/2021 87  79 - 97 fL Final   • MCH 12/18/2021 28.7  26.6 - 33.0 pg Final   • MCHC 12/18/2021 32.8  31.5 - 35.7 g/dL Final   • RDW 12/18/2021 13.1  11.7 - 15.4 % Final   • Platelets 12/18/2021 309  150 - 450 x10E3/uL Final   • Neutrophil Rel %  12/18/2021 54  Not Estab. % Final   • Lymphocyte Rel % 12/18/2021 35  Not Estab. % Final   • Monocyte Rel % 12/18/2021 8  Not Estab. % Final   • Eosinophil Rel % 12/18/2021 1  Not Estab. % Final   • Basophil Rel % 12/18/2021 1  Not Estab. % Final   • Neutrophils Absolute 12/18/2021 5.8  1.4 - 7.0 x10E3/uL Final   • Lymphocytes Absolute 12/18/2021 3.8 (A) 0.7 - 3.1 x10E3/uL Final   • Monocytes Absolute 12/18/2021 0.9  0.1 - 0.9 x10E3/uL Final   • Eosinophils Absolute 12/18/2021 0.1  0.0 - 0.4 x10E3/uL Final   • Basophils Absolute 12/18/2021 0.1  0.0 - 0.2 x10E3/uL Final   • Immature Granulocyte Rel % 12/18/2021 1  Not Estab. % Final   • Immature Grans Absolute 12/18/2021 0.1  0.0 - 0.1 x10E3/uL Final   • TSH 12/18/2021 1.570  0.450 - 4.500 uIU/mL Final             Lab Results   Component Value Date    HGBA1C 5.3 12/18/2021    HGBA1C 6.0 (H) 07/23/2019                Assessment and Plan    Diagnoses and all orders for this visit:    1. Obesity (BMI 30.0-34.9) (Primary)  Assessment & Plan:  Patient's (Body mass index is 31.98 kg/m².) indicates that they are obese (BMI >30) with health conditions that include none . Weight is improving with treatment. BMI is is above average; BMI management plan is completed. We discussed low calorie, low carb based diet program, portion control and increasing exercise.       2. Adjustment disorder with depressed mood    3. Anticholinesterase agents causing adverse effect in therapeutic use, initial encounter    4. Dry eye    5. History of tobacco use    6. Morbid (severe) obesity due to excess calories (HCC)  -     phentermine (ADIPEX-P) 37.5 MG tablet; Take 1 tablet by mouth Every Morning Before Breakfast.  Dispense: 30 tablet; Refill: 3    Obesity, improving with lifestyle modification and starting phentermine.  Blood pressure is okay and patient denies side effects we will continue phentermine and stressed need to continue increased physical activity and reduced calorie  diet.  Depression anxiety and adjustment reaction/life stress.  Patient is to continue Cymbalta may use Vistaril if needed for anxiety and  May increase trazadone to 75 or 100 mg if needed for sleep or dysthymic moods.     Medications Discontinued During This Encounter   Medication Reason   • cholecalciferol (VITAMIN D3) 10 MCG (400 UNIT) tablet *Therapy completed   • Cholecalciferol 50 MCG (2000 UT) tablet *Therapy completed   • phentermine (ADIPEX-P) 37.5 MG tablet Reorder         Follow Up     Return in about 3 months (around 10/5/2022) for Recheck weight and depression .    Patient was given instructions and counseling regarding her condition or for health maintenance advice. Please see specific information pulled into the AVS if appropriate.

## 2022-07-18 ENCOUNTER — PATIENT MESSAGE (OUTPATIENT)
Dept: FAMILY MEDICINE CLINIC | Facility: CLINIC | Age: 52
End: 2022-07-18

## 2022-07-18 DIAGNOSIS — Z12.11 SCREENING FOR MALIGNANT NEOPLASM OF COLON: ICD-10-CM

## 2022-07-18 DIAGNOSIS — K83.4 SPASM OF SPHINCTER OF ODDI: Primary | ICD-10-CM

## 2022-07-20 ENCOUNTER — TELEPHONE (OUTPATIENT)
Dept: FAMILY MEDICINE CLINIC | Facility: CLINIC | Age: 52
End: 2022-07-20

## 2022-07-20 NOTE — TELEPHONE ENCOUNTER
----- Message from Lupe Colbert MA sent at 7/18/2022  2:40 PM EDT -----  Regarding: FW: Referral     ----- Message -----  From: Amy L Sturgeon  Sent: 7/18/2022   2:38 PM EDT  To: Renee Solorio  Clinical Pool  Subject: Referral                                         Can you send a referral to be seen by Kehinde Juarez MD   Internal Medicine,  Gastroenterology, Internal Medicine Specialist,  Hematology  Star Rating 5 out of 5    (12)Reviewsfor Kehinde Juarez MD   Save  [phone]  Select PCP  OVERVIEW  LOCATIONS  PATIENT REVIEWS  Location  [address], [place] [id number] [place] [address]  get directions for: Kehinde Juarez MD18.9 Miles Away      Get Directions   View Hours OPEN NOW  [phone] Phone  [phone] TTY  [phone] Fax

## 2022-07-24 NOTE — TELEPHONE ENCOUNTER
For what medical concern is she wanting to see Dr. Juarez, assuming it is a GI issue I am okay with placing the referral but need diagnosis.

## 2022-07-25 NOTE — TELEPHONE ENCOUNTER
From: Amy L Sturgeon  To: Rosa Elena Turcios MD  Sent: 7/18/2022 2:38 PM EDT  Subject: Referral     Can you send a referral to be seen by Kehinde Juarez MD   Internal Medicine, Gastroenterology, Internal Medicine Specialist, Hematology  Star Rating 5 out of 5    (12)Reviewsfor Kehinde Juarez MD   Save  [phone]  Select PCP  OVERVIEW  LOCATIONS  PATIENT REVIEWS  Location  [address], [place] [id number] [place] [address]  get directions for: Kehinde Juarez MD18.9 Miles Away      Get Directions   View Hours OPEN NOW  [phone] Phone  [phone] TTY  [phone] Fax

## 2022-07-28 DIAGNOSIS — F33.41 RECURRENT MAJOR DEPRESSIVE DISORDER, IN PARTIAL REMISSION: ICD-10-CM

## 2022-08-01 RX ORDER — DULOXETIN HYDROCHLORIDE 60 MG/1
60 CAPSULE, DELAYED RELEASE ORAL DAILY
Qty: 90 CAPSULE | Refills: 3 | Status: SHIPPED | OUTPATIENT
Start: 2022-08-01

## 2022-08-15 PROBLEM — R10.9 ABDOMINAL PAIN: Status: ACTIVE | Noted: 2022-08-10

## 2022-08-15 PROBLEM — R74.8 ELEVATED LIPASE: Status: ACTIVE | Noted: 2022-08-10

## 2022-08-15 PROBLEM — R74.01 HIGH ASPARTATE TRANSAMINASE MEASUREMENT: Status: ACTIVE | Noted: 2022-08-10

## 2022-08-24 ENCOUNTER — OFFICE (OUTPATIENT)
Dept: URBAN - METROPOLITAN AREA CLINIC 64 | Facility: CLINIC | Age: 52
End: 2022-08-24

## 2022-08-24 VITALS
DIASTOLIC BLOOD PRESSURE: 84 MMHG | HEART RATE: 80 BPM | HEIGHT: 66 IN | SYSTOLIC BLOOD PRESSURE: 124 MMHG | WEIGHT: 187 LBS

## 2022-08-24 DIAGNOSIS — R10.13 EPIGASTRIC PAIN: ICD-10-CM

## 2022-08-24 PROCEDURE — 99204 OFFICE O/P NEW MOD 45 MIN: CPT | Performed by: INTERNAL MEDICINE

## 2022-08-24 RX ORDER — AMITRIPTYLINE HYDROCHLORIDE 25 MG/1
TABLET, FILM COATED ORAL
Qty: 30 | Refills: 6 | Status: COMPLETED
End: 2022-09-28

## 2022-09-13 DIAGNOSIS — M54.16 LUMBAR BACK PAIN WITH RADICULOPATHY AFFECTING RIGHT LOWER EXTREMITY: ICD-10-CM

## 2022-09-13 DIAGNOSIS — K21.9 GASTROESOPHAGEAL REFLUX DISEASE WITHOUT ESOPHAGITIS: ICD-10-CM

## 2022-09-13 DIAGNOSIS — M79.10 MYALGIA: ICD-10-CM

## 2022-09-13 RX ORDER — OMEPRAZOLE 40 MG/1
40 CAPSULE, DELAYED RELEASE ORAL DAILY
Qty: 90 CAPSULE | Refills: 3 | Status: SHIPPED | OUTPATIENT
Start: 2022-09-13

## 2022-09-13 RX ORDER — MELOXICAM 15 MG/1
15 TABLET ORAL DAILY
Qty: 90 TABLET | Refills: 3 | Status: SHIPPED | OUTPATIENT
Start: 2022-09-13

## 2022-09-28 ENCOUNTER — OFFICE (OUTPATIENT)
Dept: URBAN - METROPOLITAN AREA PATHOLOGY 4 | Facility: PATHOLOGY | Age: 52
End: 2022-09-28
Payer: MEDICARE

## 2022-09-28 ENCOUNTER — OFFICE (OUTPATIENT)
Dept: URBAN - METROPOLITAN AREA PATHOLOGY 4 | Facility: PATHOLOGY | Age: 52
End: 2022-09-28
Payer: COMMERCIAL

## 2022-09-28 ENCOUNTER — ON CAMPUS - OUTPATIENT (OUTPATIENT)
Dept: URBAN - METROPOLITAN AREA HOSPITAL 2 | Facility: HOSPITAL | Age: 52
End: 2022-09-28
Payer: MEDICARE

## 2022-09-28 VITALS
OXYGEN SATURATION: 95 % | SYSTOLIC BLOOD PRESSURE: 146 MMHG | SYSTOLIC BLOOD PRESSURE: 109 MMHG | SYSTOLIC BLOOD PRESSURE: 106 MMHG | DIASTOLIC BLOOD PRESSURE: 81 MMHG | HEART RATE: 91 BPM | HEART RATE: 72 BPM | DIASTOLIC BLOOD PRESSURE: 92 MMHG | HEART RATE: 75 BPM | HEART RATE: 76 BPM | SYSTOLIC BLOOD PRESSURE: 132 MMHG | DIASTOLIC BLOOD PRESSURE: 66 MMHG | OXYGEN SATURATION: 100 % | DIASTOLIC BLOOD PRESSURE: 60 MMHG | DIASTOLIC BLOOD PRESSURE: 67 MMHG | HEART RATE: 83 BPM | DIASTOLIC BLOOD PRESSURE: 73 MMHG | SYSTOLIC BLOOD PRESSURE: 112 MMHG | TEMPERATURE: 97.3 F | DIASTOLIC BLOOD PRESSURE: 82 MMHG | HEIGHT: 65 IN | WEIGHT: 183 LBS | SYSTOLIC BLOOD PRESSURE: 138 MMHG | DIASTOLIC BLOOD PRESSURE: 78 MMHG | RESPIRATION RATE: 18 BRPM | DIASTOLIC BLOOD PRESSURE: 70 MMHG | SYSTOLIC BLOOD PRESSURE: 134 MMHG | SYSTOLIC BLOOD PRESSURE: 107 MMHG | DIASTOLIC BLOOD PRESSURE: 77 MMHG | SYSTOLIC BLOOD PRESSURE: 95 MMHG | HEART RATE: 89 BPM | RESPIRATION RATE: 16 BRPM | HEART RATE: 74 BPM

## 2022-09-28 DIAGNOSIS — K29.60 OTHER GASTRITIS WITHOUT BLEEDING: ICD-10-CM

## 2022-09-28 DIAGNOSIS — R10.13 EPIGASTRIC PAIN: ICD-10-CM

## 2022-09-28 DIAGNOSIS — R11.2 NAUSEA WITH VOMITING, UNSPECIFIED: ICD-10-CM

## 2022-09-28 DIAGNOSIS — R93.2 ABNORMAL FINDINGS ON DIAGNOSTIC IMAGING OF LIVER AND BILIARY: ICD-10-CM

## 2022-09-28 DIAGNOSIS — Z12.11 ENCOUNTER FOR SCREENING FOR MALIGNANT NEOPLASM OF COLON: ICD-10-CM

## 2022-09-28 DIAGNOSIS — K44.9 DIAPHRAGMATIC HERNIA WITHOUT OBSTRUCTION OR GANGRENE: ICD-10-CM

## 2022-09-28 DIAGNOSIS — K57.30 DIVERTICULOSIS OF LARGE INTESTINE WITHOUT PERFORATION OR ABS: ICD-10-CM

## 2022-09-28 DIAGNOSIS — K31.89 OTHER DISEASES OF STOMACH AND DUODENUM: ICD-10-CM

## 2022-09-28 LAB
GI HISTOLOGY: A. SELECT: (no result)
GI HISTOLOGY: PDF REPORT: (no result)

## 2022-09-28 PROCEDURE — 43239 EGD BIOPSY SINGLE/MULTIPLE: CPT | Performed by: INTERNAL MEDICINE

## 2022-09-28 PROCEDURE — 88305 TISSUE EXAM BY PATHOLOGIST: CPT | Mod: 26 | Performed by: INTERNAL MEDICINE

## 2022-09-28 PROCEDURE — G0121 COLON CA SCRN NOT HI RSK IND: HCPCS | Performed by: INTERNAL MEDICINE

## 2022-09-28 RX ADMIN — IPRATROPIUM BROMIDE AND ALBUTEROL SULFATE: .5; 3 SOLUTION RESPIRATORY (INHALATION) at 10:50

## 2022-09-28 NOTE — SERVICEHPINOTES
AMY STURGEON  is a  52  female   who presents today for a  EGD-Colonoscopy   for   the indications listed below. The updated Patient Profile was reviewed prior to the procedure, in conjunction with the Physical Exam, including medical conditions, surgical procedures, medications, allergies, family history and social history. See Physical Exam time stamp below for date and time of HPI completion.Pre-operatively, I reviewed the indication(s) for the procedure, the risks of the procedure [including but not limited to: unexpected bleeding possibly requiring hospitalization and/or unplanned repeat procedures, perforation possibly requiring surgical treatment, missed lesions and complications of sedation/MAC (also explained by anesthesia staff)]. I have evaluated the patient for risks associated with the planned anesthesia and the procedure to be performed and find the patient an acceptable candidate for IV sedation.Multiple opportunities were provided for any questions or concerns, and all questions were answered satisfactorily before any anesthesia was administered. We will proceed with the planned procedure.br

## 2022-10-03 PROBLEM — K44.9 DIAPHRAGMATIC HERNIA WITHOUT OBSTRUCTION OR GANGRENE: Status: ACTIVE | Noted: 2022-09-28

## 2022-10-20 ENCOUNTER — HOSPITAL ENCOUNTER (OUTPATIENT)
Facility: HOSPITAL | Age: 52
Setting detail: HOSPITAL OUTPATIENT SURGERY
Discharge: HOME | End: 2022-10-20
Attending: INTERNAL MEDICINE | Admitting: INTERNAL MEDICINE

## 2022-10-20 ENCOUNTER — ON CAMPUS - OUTPATIENT (OUTPATIENT)
Dept: URBAN - METROPOLITAN AREA HOSPITAL 85 | Facility: HOSPITAL | Age: 52
End: 2022-10-20
Payer: MEDICARE

## 2022-10-20 ENCOUNTER — ANESTHESIA EVENT (OUTPATIENT)
Dept: GASTROENTEROLOGY | Facility: HOSPITAL | Age: 52
End: 2022-10-20

## 2022-10-20 ENCOUNTER — APPOINTMENT (OUTPATIENT)
Dept: GENERAL RADIOLOGY | Facility: HOSPITAL | Age: 52
End: 2022-10-20

## 2022-10-20 ENCOUNTER — ANESTHESIA (OUTPATIENT)
Dept: GASTROENTEROLOGY | Facility: HOSPITAL | Age: 52
End: 2022-10-20

## 2022-10-20 VITALS
TEMPERATURE: 98.1 F | HEART RATE: 95 BPM | WEIGHT: 196.21 LBS | SYSTOLIC BLOOD PRESSURE: 117 MMHG | HEIGHT: 65 IN | BODY MASS INDEX: 32.69 KG/M2 | RESPIRATION RATE: 16 BRPM | DIASTOLIC BLOOD PRESSURE: 75 MMHG | OXYGEN SATURATION: 100 %

## 2022-10-20 DIAGNOSIS — K44.9 DIAPHRAGMATIC HERNIA WITHOUT OBSTRUCTION OR GANGRENE: ICD-10-CM

## 2022-10-20 DIAGNOSIS — R10.11 RIGHT UPPER QUADRANT PAIN: ICD-10-CM

## 2022-10-20 DIAGNOSIS — R74.01 ELEVATION OF LEVELS OF LIVER TRANSAMINASE LEVELS: ICD-10-CM

## 2022-10-20 DIAGNOSIS — K83.8 OTHER SPECIFIED DISEASES OF BILIARY TRACT: ICD-10-CM

## 2022-10-20 DIAGNOSIS — K83.4 SPASM OF SPHINCTER OF ODDI: ICD-10-CM

## 2022-10-20 PROCEDURE — 25010000002 IOPAMIDOL 61 % SOLUTION 30 ML VIAL: Performed by: INTERNAL MEDICINE

## 2022-10-20 PROCEDURE — 25010000002 DEXAMETHASONE PER 1 MG: Performed by: STUDENT IN AN ORGANIZED HEALTH CARE EDUCATION/TRAINING PROGRAM

## 2022-10-20 PROCEDURE — 25010000002 ONDANSETRON PER 1 MG

## 2022-10-20 PROCEDURE — 25010000002 FENTANYL CITRATE (PF) 100 MCG/2ML SOLUTION: Performed by: STUDENT IN AN ORGANIZED HEALTH CARE EDUCATION/TRAINING PROGRAM

## 2022-10-20 PROCEDURE — C1726 CATH, BAL DIL, NON-VASCULAR: HCPCS | Performed by: INTERNAL MEDICINE

## 2022-10-20 PROCEDURE — 25010000002 ONDANSETRON PER 1 MG: Performed by: STUDENT IN AN ORGANIZED HEALTH CARE EDUCATION/TRAINING PROGRAM

## 2022-10-20 PROCEDURE — 74328 X-RAY BILE DUCT ENDOSCOPY: CPT

## 2022-10-20 PROCEDURE — 25010000002 DIPHENHYDRAMINE PER 50 MG: Performed by: STUDENT IN AN ORGANIZED HEALTH CARE EDUCATION/TRAINING PROGRAM

## 2022-10-20 PROCEDURE — 25010000002 PROPOFOL 10 MG/ML EMULSION: Performed by: STUDENT IN AN ORGANIZED HEALTH CARE EDUCATION/TRAINING PROGRAM

## 2022-10-20 PROCEDURE — 25010000002 LEVOFLOXACIN PER 250 MG: Performed by: INTERNAL MEDICINE

## 2022-10-20 PROCEDURE — C1769 GUIDE WIRE: HCPCS | Performed by: INTERNAL MEDICINE

## 2022-10-20 PROCEDURE — 43260 ERCP W/SPECIMEN COLLECTION: CPT | Performed by: INTERNAL MEDICINE

## 2022-10-20 RX ORDER — SODIUM CHLORIDE 9 MG/ML
INJECTION, SOLUTION INTRAVENOUS CONTINUOUS PRN
Status: DISCONTINUED | OUTPATIENT
Start: 2022-10-20 | End: 2022-10-20 | Stop reason: SURG

## 2022-10-20 RX ORDER — FAMOTIDINE 10 MG/ML
20 INJECTION, SOLUTION INTRAVENOUS EVERY 12 HOURS SCHEDULED
Status: DISCONTINUED | OUTPATIENT
Start: 2022-10-20 | End: 2022-10-20

## 2022-10-20 RX ORDER — NEOSTIGMINE METHYLSULFATE 5 MG/5 ML
SYRINGE (ML) INTRAVENOUS AS NEEDED
Status: DISCONTINUED | OUTPATIENT
Start: 2022-10-20 | End: 2022-10-20 | Stop reason: SURG

## 2022-10-20 RX ORDER — ROCURONIUM BROMIDE 10 MG/ML
INJECTION, SOLUTION INTRAVENOUS AS NEEDED
Status: DISCONTINUED | OUTPATIENT
Start: 2022-10-20 | End: 2022-10-20 | Stop reason: SURG

## 2022-10-20 RX ORDER — DIPHENHYDRAMINE HYDROCHLORIDE 50 MG/ML
25 INJECTION INTRAMUSCULAR; INTRAVENOUS ONCE
Status: COMPLETED | OUTPATIENT
Start: 2022-10-20 | End: 2022-10-20

## 2022-10-20 RX ORDER — FAMOTIDINE 10 MG/ML
20 INJECTION, SOLUTION INTRAVENOUS ONCE
Status: COMPLETED | OUTPATIENT
Start: 2022-10-20 | End: 2022-10-20

## 2022-10-20 RX ORDER — ONDANSETRON 2 MG/ML
INJECTION INTRAMUSCULAR; INTRAVENOUS
Status: COMPLETED
Start: 2022-10-20 | End: 2022-10-20

## 2022-10-20 RX ORDER — GLYCOPYRROLATE 0.2 MG/ML
INJECTION INTRAMUSCULAR; INTRAVENOUS AS NEEDED
Status: DISCONTINUED | OUTPATIENT
Start: 2022-10-20 | End: 2022-10-20 | Stop reason: SURG

## 2022-10-20 RX ORDER — LEVOFLOXACIN 5 MG/ML
INJECTION, SOLUTION INTRAVENOUS
Status: DISCONTINUED
Start: 2022-10-20 | End: 2022-10-20 | Stop reason: HOSPADM

## 2022-10-20 RX ORDER — PROPOFOL 10 MG/ML
VIAL (ML) INTRAVENOUS AS NEEDED
Status: DISCONTINUED | OUTPATIENT
Start: 2022-10-20 | End: 2022-10-20 | Stop reason: SURG

## 2022-10-20 RX ORDER — ONDANSETRON 2 MG/ML
4 INJECTION INTRAMUSCULAR; INTRAVENOUS ONCE
Status: DISCONTINUED | OUTPATIENT
Start: 2022-10-20 | End: 2022-10-20 | Stop reason: HOSPADM

## 2022-10-20 RX ORDER — SODIUM CHLORIDE 9 MG/ML
9 INJECTION, SOLUTION INTRAVENOUS ONCE
Status: COMPLETED | OUTPATIENT
Start: 2022-10-20 | End: 2022-10-20

## 2022-10-20 RX ORDER — LIDOCAINE HYDROCHLORIDE 10 MG/ML
INJECTION, SOLUTION EPIDURAL; INFILTRATION; INTRACAUDAL; PERINEURAL AS NEEDED
Status: DISCONTINUED | OUTPATIENT
Start: 2022-10-20 | End: 2022-10-20 | Stop reason: SURG

## 2022-10-20 RX ORDER — FENTANYL CITRATE 50 UG/ML
INJECTION, SOLUTION INTRAMUSCULAR; INTRAVENOUS AS NEEDED
Status: DISCONTINUED | OUTPATIENT
Start: 2022-10-20 | End: 2022-10-20 | Stop reason: SURG

## 2022-10-20 RX ORDER — ONDANSETRON 2 MG/ML
INJECTION INTRAMUSCULAR; INTRAVENOUS AS NEEDED
Status: DISCONTINUED | OUTPATIENT
Start: 2022-10-20 | End: 2022-10-20 | Stop reason: SURG

## 2022-10-20 RX ORDER — DEXAMETHASONE SODIUM PHOSPHATE 4 MG/ML
INJECTION, SOLUTION INTRA-ARTICULAR; INTRALESIONAL; INTRAMUSCULAR; INTRAVENOUS; SOFT TISSUE AS NEEDED
Status: DISCONTINUED | OUTPATIENT
Start: 2022-10-20 | End: 2022-10-20 | Stop reason: SURG

## 2022-10-20 RX ORDER — LEVOFLOXACIN 5 MG/ML
500 INJECTION, SOLUTION INTRAVENOUS ONCE
Status: COMPLETED | OUTPATIENT
Start: 2022-10-20 | End: 2022-10-20

## 2022-10-20 RX ADMIN — DEXAMETHASONE SODIUM PHOSPHATE 4 MG: 4 INJECTION, SOLUTION INTRAMUSCULAR; INTRAVENOUS at 09:29

## 2022-10-20 RX ADMIN — ROCURONIUM BROMIDE 50 MG: 10 INJECTION, SOLUTION INTRAVENOUS at 09:19

## 2022-10-20 RX ADMIN — LEVOFLOXACIN 500 MG: 500 INJECTION, SOLUTION INTRAVENOUS at 10:20

## 2022-10-20 RX ADMIN — LIDOCAINE HYDROCHLORIDE 50 MG: 10 INJECTION, SOLUTION EPIDURAL; INFILTRATION; INTRACAUDAL; PERINEURAL at 09:19

## 2022-10-20 RX ADMIN — Medication 3 MG: at 09:51

## 2022-10-20 RX ADMIN — FAMOTIDINE 20 MG: 10 INJECTION, SOLUTION INTRAVENOUS at 08:31

## 2022-10-20 RX ADMIN — SODIUM CHLORIDE 9 ML/HR: 0.9 INJECTION, SOLUTION INTRAVENOUS at 08:10

## 2022-10-20 RX ADMIN — ONDANSETRON 4 MG: 2 INJECTION INTRAMUSCULAR; INTRAVENOUS at 10:36

## 2022-10-20 RX ADMIN — GLYCOPYRROLATE 0.6 MCG: 0.2 INJECTION INTRAMUSCULAR; INTRAVENOUS at 09:51

## 2022-10-20 RX ADMIN — FENTANYL CITRATE 50 MCG: 50 INJECTION, SOLUTION INTRAMUSCULAR; INTRAVENOUS at 09:17

## 2022-10-20 RX ADMIN — ONDANSETRON 4 MG: 2 INJECTION INTRAMUSCULAR; INTRAVENOUS at 09:54

## 2022-10-20 RX ADMIN — FENTANYL CITRATE 50 MCG: 50 INJECTION, SOLUTION INTRAMUSCULAR; INTRAVENOUS at 09:25

## 2022-10-20 RX ADMIN — PROPOFOL 150 MG: 10 INJECTION, EMULSION INTRAVENOUS at 09:19

## 2022-10-20 RX ADMIN — DIPHENHYDRAMINE HYDROCHLORIDE 25 MG: 50 INJECTION, SOLUTION INTRAMUSCULAR; INTRAVENOUS at 08:30

## 2022-10-20 RX ADMIN — SODIUM CHLORIDE: 0.9 INJECTION, SOLUTION INTRAVENOUS at 09:16

## 2022-10-20 NOTE — DISCHARGE INSTRUCTIONS
-A responsible adult should stay with you and you should rest quietly for the rest of the day.    Do not drink alcohol, drive, operate any heavy machinery or power tools or make any legal/important decisions for the next 24 hours.     Progress your diet as tolerated.  If you begin to experience severe pain, increased shortness of breath, racing heartbeat or a fever above 101 F, seek immediate medical attention.     Follow up with MD as instructed. Call office for results in 3 to 5 days if needed.    [phone]    Impression:  1.  Patient with abnormal liver function test right upper quadrant pain dilated duct status post of sphincterotomy and clearance of common bile duct.  2.  Limited evaluation of esophageal gastric and duodenal mucosa looks normal.     Recommendations:  Follow-up with a liver function test.  Follow with a clinical response to sphincterotomy and clearance of common bile duct.  Patient will be placed on clear liquid today and low-fat diet in the morning.  She will follow in the office as scheduled.    Hold MOBIC AND ANY NSAIDS FOR 10 DAYS

## 2022-10-20 NOTE — ANESTHESIA POSTPROCEDURE EVALUATION
Patient: Amy L Sturgeon    Procedure Summary     Date: 10/20/22 Room / Location: [facility] ENDOSCOPY 2 / [facility] ENDOSCOPY    Anesthesia Start: 0916 Anesthesia Stop: 1008    Procedure: ERCP WITH SPHICTEROTOMY and balloon clearnace of  common bile duct Diagnosis:       Spasm of sphincter of Oddi      Diaphragmatic hernia      Erythema      (Spasm of sphincter of Oddi [K83.4])      (Diaphragmatic hernia [K44.9])      (Erythema [L53.9])    Surgeons: Eli Banks MD Provider: Regan Velasquez MD    Anesthesia Type: general ASA Status: 2          Anesthesia Type: general    Vitals  Vitals Value Taken Time   /68 10/20/22 1026   Temp     Pulse 73 10/20/22 1030   Resp     SpO2 96 % 10/20/22 1030   Vitals shown include unvalidated device data.        Post Anesthesia Care and Evaluation    Patient location during evaluation: PACU  Patient participation: complete - patient participated  Level of consciousness: awake  Pain score: 0  Pain management: adequate    Airway patency: patent  Anesthetic complications: No anesthetic complications  PONV Status: none  Cardiovascular status: acceptable  Respiratory status: acceptable  Hydration status: acceptable    Comments: Patient seen and examined postoperatively; vital signs stable; SpO2 greater than or equal to 90%; cardiopulmonary status stable; nausea/vomiting adequately controlled; pain adequately controlled; no apparent anesthesia complications; patient discharged from anesthesia care when discharge criteria were met

## 2022-10-20 NOTE — ANESTHESIA PROCEDURE NOTES
Airway  Airway not difficult    General Information and Staff    Patient location during procedure: OR  Anesthesiologist: Regan Velasquez MD    Indications and Patient Condition  Indications for airway management: airway protection and CNS depression    Preoxygenated: yes  MILS maintained throughout  Mask difficulty assessment: 1 - vent by mask    Final Airway Details  Final airway type: endotracheal airway      Successful airway: ETT  Cuffed: yes   Successful intubation technique: direct laryngoscopy  Endotracheal tube insertion site: oral  Blade: Kaykay  Blade size: 3  ETT size (mm): 7.0  Cormack-Lehane Classification: grade I - full view of glottis  Placement verified by: chest auscultation, capnometry and palpation of cuff   Cuff volume (mL): 9  Measured from: teeth  ETT/EBT  to teeth (cm): 20  Number of attempts at approach: 1  Assessment: lips, teeth, and gum same as pre-op and atraumatic intubation

## 2022-10-20 NOTE — H&P
Patient Care Team:  Rosa Elena Turcios MD as PCP - General (Family Medicine)  Mariluz Avila as Technologist      Subjective .     History of present illness:    Amy L Sturgeon is a 52 y.o. female who presents today for Procedure(s):  ERCP WITH SPHICTEROTOMY for the indications listed below.     The updated Patient Profile was reviewed prior to the procedure, in conjunction with the Physical Exam, including medical conditions, surgical procedures, medications, allergies, family history and social history.     Pre-operatively, I reviewed the indication(s) for the procedure, the risks of the procedure [including but not limited to: unexpected bleeding possibly requiring hospitalization and/or unplanned repeat procedures, perforation possibly requiring surgical treatment, missed lesions and complications of sedation/MAC (also explained by anesthesia staff)].     I have evaluated the patient for risks associated with the planned anesthesia and the procedure to be performed and find the patient an acceptable candidate for IV sedation.    Multiple opportunities were provided for any questions or concerns, and all questions were answered satisfactorily before any anesthesia was administered. We will proceed with the planned procedure.      ASSESSMENT/PLAN:  52 years old female with a history of right upper quadrant pain abnormal liver function test have responded to previous sphincterotomy here for further evaluation      Past Medical History:  Past Medical History:   Diagnosis Date   • Allergic    • Anesthesia complication     when e tube removed had tracheal spasm will need nebulizer treatment   • Anxiety    • Arthritis    • Asthma    • Brain tumor (benign) (HCC)    • Closed tibial fracture 05/2019    right    • Depression    • Fibromyalgia    • GERD (gastroesophageal reflux disease)    • H/O bone density study 05/2008   • H/O mammogram 11/15/2012   • H/O mammogram 04/04/2018   • History of EKG 01/23/2012   •  Hyperlipidemia    • Hypertension    • Insomnia    • MARY CARMEN treated with BiPAP 2021    not using doesnt tolerate   • Pap smear for cervical cancer screening 08/15/2011   • Sphincter of Oddi dysfunction        Past Surgical History:  Past Surgical History:   Procedure Laterality Date   • CARDIOVASCULAR STRESS TEST  2012   • CHOLECYSTECTOMY  2006   • COLONOSCOPY  2008   • DILATION AND CURETTAGE, DIAGNOSTIC / THERAPEUTIC     • ENDOSCOPY     • ERCP W/ PLASTIC STENT PLACEMENT     • HYSTERECTOMY     • LAPAROSCOPIC TUBAL LIGATION         Social History:  Social History     Tobacco Use   • Smoking status: Former     Packs/day: 0.50     Years: 8.00     Pack years: 4.00     Types: Cigarettes     Start date: 1987     Quit date: 1995     Years since quittin.8   • Smokeless tobacco: Never   Vaping Use   • Vaping Use: Never used   Substance Use Topics   • Alcohol use: Yes     Alcohol/week: 1.0 standard drink     Types: 1 Glasses of wine per week     Comment: Social   • Drug use: No       Family History:  Family History   Problem Relation Age of Onset   • Hypertension Mother    • Breast cancer Mother    • Heart disease Father    • Diabetes Brother    • Breast cancer Maternal Grandmother    • Heart disease Maternal Grandmother    • Heart disease Paternal Grandfather    • Ovarian cancer Neg Hx        Medications:  Medications Prior to Admission   Medication Sig Dispense Refill Last Dose   • albuterol sulfate  (90 Base) MCG/ACT inhaler Inhale 2 puffs Every 4 (Four) Hours As Needed for Wheezing. (Patient taking differently: Inhale 2 puffs Every 4 (Four) Hours As Needed for Wheezing. Use if needed bring with) 24 g 3 Past Week   • atorvastatin (LIPITOR) 10 MG tablet Take 1 tablet by mouth Every Night. (Patient taking differently: Take 1 tablet by mouth Daily.) 90 tablet 3 10/19/2022   • DULoxetine (CYMBALTA) 60 MG capsule Take 1 capsule by mouth Daily. 90 capsule 3 10/19/2022   •  gabapentin (NEURONTIN) 800 MG tablet TAKE 1 TABLET BY MOUTH 3  TIMES DAILY (MAY TAKE UP TO 4 TIMES DAILY IF NEEDED) (Patient taking differently: Take 1 tablet by mouth 3 (Three) Times a Day. May take 4th dose if needed) 360 tablet 3 10/19/2022   • losartan (COZAAR) 25 MG tablet Take 1 tablet by mouth Daily. (Patient taking differently: Take 1 tablet by mouth Daily. Ld 10/19 by 0830) 90 tablet 3 Past Week   • meloxicam (MOBIC) 15 MG tablet TAKE 1 TABLET BY MOUTH  DAILY (Patient taking differently: Take 1 tablet by mouth Daily. None preop) 90 tablet 3 10/19/2022   • montelukast (Singulair) 10 MG tablet Take 1 tablet by mouth Every Night. (Patient taking differently: Take 1 tablet by mouth Daily.) 90 tablet 3 10/19/2022   • omeprazole (priLOSEC) 40 MG capsule TAKE 1 CAPSULE BY MOUTH  DAILY (Patient taking differently: Take 1 capsule by mouth Every Night.) 90 capsule 3 10/19/2022   • polyethylene glycol (MiraLax) 17 GM/SCOOP powder Take 17 g by mouth Daily. (Patient taking differently: Take 17 g by mouth As Needed. None preop) 850 g 3 Past Month   • traZODone (DESYREL) 50 MG tablet Take 1 tablet by mouth Every Night. 90 tablet 3 10/19/2022   • famotidine (PEPCID) 20 MG tablet Take 1 tablet by mouth.      • hydrOXYzine pamoate (VISTARIL) 25 MG capsule TAKE 1 CAPSULE BY MOUTH THREE TIMES DAILY AS NEEDED FOR ANXIETY (Patient taking differently: Take 1 capsule by mouth 3 (Three) Times a Day As Needed.) 30 capsule 0 More than a month   • phentermine (ADIPEX-P) 37.5 MG tablet Take 1 tablet by mouth Every Morning Before Breakfast. 30 tablet 3 More than a month       Scheduled Meds:  Continuous Infusions:No current facility-administered medications for this encounter.    PRN Meds:.    ALLERGIES:  Iodinated diagnostic agents and Contrast dye        Objective     Vital Signs:   Temp:  [98 °F (36.7 °C)] 98 °F (36.7 °C)  Heart Rate:  [75] 75  Resp:  [16] 16  BP: (113)/(68) 113/68    Physical Exam:      General Appearance:    Awake  and alert, in no acute distress   Lungs:     Clear to auscultation bilaterally, respirations regular, even and unlabored    Heart:    Regular rhythm and normal rate, normal S1 and S2, no            murmur, no gallop, no rub   Abdomen:     Normal bowel sounds, soft, non-tender, no rebound or guarding, non-distended, no hepatosplenomegaly        Results Review:   I reviewed the patient's labs and imaging.  Lab Results (last 24 hours)     ** No results found for the last 24 hours. **          Imaging Results (Last 24 Hours)     ** No results found for the last 24 hours. **             I discussed the patients findings and my recommendations with the patient.  Eli Banks MD  10/20/22  08:16 EDT

## 2022-10-20 NOTE — OP NOTE
ENDOSCOPIC [facility] Procedure Report    Patient Name:  Amy L Sturgeon  YOB: 1970    Date of Surgery:  10/20/2022     Pre-Op Diagnosis:  Spasm of sphincter of Oddi [K83.4]  Diaphragmatic hernia [K44.9]  Erythema [L53.9]        Procedure/CPT® Codes:      Procedure(s):  ERCP WITH SPHICTEROTOMY and balloon clearnace of  common bile duct    Staff:  Surgeon(s):  Eli Banks MD      Anesthesia: General    Description of Procedure:  A description of the procedure as well as risks, benefits and alternative methods were explained to the patient who voiced understanding and signed the corresponding consent form.Specifically risks of post-ERCP pancreatitis, bleeding, perforation, failure to canulate and adverse reaction to sedation were discussed. A physical exam was performed and vital signs were monitored throughout the procedure.    A  film was performed which was normal. With the patient in the semi-prone position, an Olympus side viewing endoscope was placed into the mouth and proceeded through the esophagus, stomach and second portion of the duodenum without difficulty. Limited views of the esophagus and stomach were normal. The ampulla was visualized and appeared normal. A [employer] hydrotome was used to cannulate the ampulla using wire guided technique. Bile was aspirated to ensure this was the duct of interest. Contrast was injected into the bile duct.      Findings:   Common bile duct was mildly prominent at least close to 9 to 10 mm in the common hepatic area close to 9 mm lower third with a residual sphincter noticed given the imaging finding as well as some scarring during cannulation was appreciated.  At this stage, we did sphincterotomy extension still there was a slightly poor drainage and small residual sphincter which was obliterated with HurriCaine 8 to 10 mm balloon.  Subsequent to that, bile was draining freely, no further residual sphincter was  noticed.  On multiple Speece with 9 to 12 mm balloon no stone or sludge was retrieved.  Patient Toller procedure very well immediate complication was noticed.  Pancreatic duct was not cannulated.  Pancreas duct was not cannulated, patient was also given indomethacin 100 mg as well as 1 L fluid.    Impression:  1.  Patient with abnormal liver function test right upper quadrant pain dilated duct status post of sphincterotomy and clearance of common bile duct.  2.  Limited evaluation of esophageal gastric and duodenal mucosa looks normal.    Recommendations:  Follow-up with a liver function test.  Follow with a clinical response to sphincterotomy and clearance of common bile duct.  Patient will be placed on clear liquid today and low-fat diet in the morning.  She will follow in the office as scheduled.      Eli Banks MD     Date: 10/20/2022    Time: 10:07 EDT

## 2022-10-21 DIAGNOSIS — I10 ESSENTIAL HYPERTENSION: ICD-10-CM

## 2022-10-24 RX ORDER — LOSARTAN POTASSIUM 25 MG/1
25 TABLET ORAL DAILY
Qty: 90 TABLET | Refills: 0 | Status: SHIPPED | OUTPATIENT
Start: 2022-10-24 | End: 2023-01-12

## 2022-10-24 NOTE — TELEPHONE ENCOUNTER
Please inform patient I am renewing losartan however she is past due for Recheck on weight and depression (and BP check).  Please have her schedule prior to additional refills

## 2022-10-27 DIAGNOSIS — E78.2 MIXED HYPERLIPIDEMIA: ICD-10-CM

## 2022-10-28 RX ORDER — ATORVASTATIN CALCIUM 10 MG/1
TABLET, FILM COATED ORAL
Qty: 90 TABLET | Refills: 3 | Status: SHIPPED | OUTPATIENT
Start: 2022-10-28

## 2022-11-16 ENCOUNTER — OFFICE VISIT (OUTPATIENT)
Dept: FAMILY MEDICINE CLINIC | Facility: CLINIC | Age: 52
End: 2022-11-16

## 2022-11-16 VITALS
BODY MASS INDEX: 30.86 KG/M2 | SYSTOLIC BLOOD PRESSURE: 136 MMHG | TEMPERATURE: 97.6 F | RESPIRATION RATE: 18 BRPM | HEART RATE: 73 BPM | WEIGHT: 192 LBS | DIASTOLIC BLOOD PRESSURE: 82 MMHG | OXYGEN SATURATION: 100 % | HEIGHT: 66 IN

## 2022-11-16 DIAGNOSIS — N95.2 ATROPHIC VAGINITIS: ICD-10-CM

## 2022-11-16 DIAGNOSIS — F33.41 RECURRENT MAJOR DEPRESSIVE DISORDER, IN PARTIAL REMISSION: ICD-10-CM

## 2022-11-16 DIAGNOSIS — E66.01 MORBID (SEVERE) OBESITY DUE TO EXCESS CALORIES: ICD-10-CM

## 2022-11-16 DIAGNOSIS — N94.10 DYSPAREUNIA IN FEMALE: ICD-10-CM

## 2022-11-16 DIAGNOSIS — E66.09 CLASS 1 OBESITY DUE TO EXCESS CALORIES WITH SERIOUS COMORBIDITY AND BODY MASS INDEX (BMI) OF 31.0 TO 31.9 IN ADULT: Primary | ICD-10-CM

## 2022-11-16 DIAGNOSIS — Z87.891 HISTORY OF TOBACCO USE: ICD-10-CM

## 2022-11-16 DIAGNOSIS — I10 PRIMARY HYPERTENSION: ICD-10-CM

## 2022-11-16 DIAGNOSIS — F41.9 ANXIETY: ICD-10-CM

## 2022-11-16 DIAGNOSIS — Z80.3 FAMILY HISTORY OF MALIGNANT NEOPLASM OF BREAST IN FIRST DEGREE RELATIVE: ICD-10-CM

## 2022-11-16 DIAGNOSIS — E78.2 MIXED HYPERLIPIDEMIA: ICD-10-CM

## 2022-11-16 PROCEDURE — 99214 OFFICE O/P EST MOD 30 MIN: CPT | Performed by: FAMILY MEDICINE

## 2022-11-16 RX ORDER — PHENTERMINE HYDROCHLORIDE 37.5 MG/1
37.5 TABLET ORAL
Qty: 30 TABLET | Refills: 3 | Status: SHIPPED | OUTPATIENT
Start: 2022-11-16

## 2022-11-16 RX ORDER — CONJUGATED ESTROGENS 0.62 MG/G
CREAM VAGINAL
Qty: 42.5 G | Refills: 2 | Status: SHIPPED | OUTPATIENT
Start: 2022-11-16

## 2022-12-01 DIAGNOSIS — J45.40 MODERATE PERSISTENT ASTHMA, UNSPECIFIED WHETHER COMPLICATED: ICD-10-CM

## 2022-12-01 DIAGNOSIS — F51.01 PRIMARY INSOMNIA: ICD-10-CM

## 2022-12-02 RX ORDER — MONTELUKAST SODIUM 10 MG/1
TABLET ORAL
Qty: 90 TABLET | Refills: 3 | Status: SHIPPED | OUTPATIENT
Start: 2022-12-02

## 2022-12-03 RX ORDER — TRAZODONE HYDROCHLORIDE 50 MG/1
TABLET ORAL
Qty: 90 TABLET | Refills: 0 | Status: SHIPPED | OUTPATIENT
Start: 2022-12-03 | End: 2023-02-26

## 2022-12-18 ENCOUNTER — TELEPHONE (OUTPATIENT)
Dept: FAMILY MEDICINE CLINIC | Facility: CLINIC | Age: 52
End: 2022-12-18

## 2022-12-18 NOTE — TELEPHONE ENCOUNTER
Yes, you can schedule her son, Luisito, as a new patient with me.  Please advise if he is having significant anxiety he should also consider scheduling with a counselor and/or psychiatrist.    Please copy and or summarize this request and response in Luisito PriceSturgeon's chart for future reference

## 2022-12-18 NOTE — TELEPHONE ENCOUNTER
----- Message from Blank Oshea MA sent at 12/15/2022 11:30 AM EST -----  Regarding: FW: New Patient request   Contact: [phone]    ----- Message -----  From: Sturgeon, Amy L  Sent: 12/15/2022  11:09 AM EST  To: Renee Solorio  Clinical Pool  Subject: New Patient request                              Enoc [place],    This is Amy Sturgeon.  My grown son has very bad anxiety and needs a good doctor.  I would greatly appreciate if you took him as a new patient since you treat Jose M and I for anxiety.  You know the family history.  His name is Luisito Pricerejion.      Thanks  Amy Sturgeon Merry Christmas

## 2023-01-12 DIAGNOSIS — I10 ESSENTIAL HYPERTENSION: ICD-10-CM

## 2023-01-12 RX ORDER — LOSARTAN POTASSIUM 25 MG/1
25 TABLET ORAL DAILY
Qty: 90 TABLET | Refills: 0 | Status: SHIPPED | OUTPATIENT
Start: 2023-01-12

## 2023-01-27 ENCOUNTER — OFFICE VISIT (OUTPATIENT)
Dept: FAMILY MEDICINE CLINIC | Facility: CLINIC | Age: 53
End: 2023-01-27
Payer: MEDICARE

## 2023-01-27 VITALS
BODY MASS INDEX: 31.02 KG/M2 | DIASTOLIC BLOOD PRESSURE: 72 MMHG | SYSTOLIC BLOOD PRESSURE: 128 MMHG | RESPIRATION RATE: 18 BRPM | HEIGHT: 66 IN | WEIGHT: 193 LBS | HEART RATE: 100 BPM | TEMPERATURE: 98 F | OXYGEN SATURATION: 96 %

## 2023-01-27 DIAGNOSIS — Z00.00 MEDICARE ANNUAL WELLNESS VISIT, SUBSEQUENT: Primary | ICD-10-CM

## 2023-01-27 DIAGNOSIS — J30.9 ALLERGIC RHINITIS, UNSPECIFIED SEASONALITY, UNSPECIFIED TRIGGER: ICD-10-CM

## 2023-01-27 DIAGNOSIS — Z23 NEED FOR PNEUMOCOCCAL VACCINATION: ICD-10-CM

## 2023-01-27 DIAGNOSIS — M81.0 OSTEOPOROSIS, UNSPECIFIED OSTEOPOROSIS TYPE, UNSPECIFIED PATHOLOGICAL FRACTURE PRESENCE: ICD-10-CM

## 2023-01-27 DIAGNOSIS — Z87.891 HISTORY OF TOBACCO USE: ICD-10-CM

## 2023-01-27 DIAGNOSIS — Z12.4 CERVICAL CANCER SCREENING: ICD-10-CM

## 2023-01-27 DIAGNOSIS — N89.6 TIGHT INTROITUS: ICD-10-CM

## 2023-01-27 DIAGNOSIS — Z78.0 MENOPAUSE: ICD-10-CM

## 2023-01-27 DIAGNOSIS — E66.09 CLASS 1 OBESITY DUE TO EXCESS CALORIES WITH SERIOUS COMORBIDITY AND BODY MASS INDEX (BMI) OF 31.0 TO 31.9 IN ADULT: ICD-10-CM

## 2023-01-27 DIAGNOSIS — D33.3 VESTIBULAR SCHWANNOMA: ICD-10-CM

## 2023-01-27 DIAGNOSIS — N94.10 DYSPAREUNIA IN FEMALE: ICD-10-CM

## 2023-01-27 DIAGNOSIS — N94.11 INTROITAL DYSPAREUNIA: ICD-10-CM

## 2023-01-27 DIAGNOSIS — Z12.31 ENCOUNTER FOR SCREENING MAMMOGRAM FOR BREAST CANCER: ICD-10-CM

## 2023-01-27 DIAGNOSIS — N95.2 ATROPHIC VAGINITIS: ICD-10-CM

## 2023-01-27 DIAGNOSIS — Z80.3 FAMILY HISTORY OF BREAST CANCER: ICD-10-CM

## 2023-01-27 PROCEDURE — G0009 ADMIN PNEUMOCOCCAL VACCINE: HCPCS | Performed by: FAMILY MEDICINE

## 2023-01-27 PROCEDURE — 90677 PCV20 VACCINE IM: CPT | Performed by: FAMILY MEDICINE

## 2023-01-27 PROCEDURE — 1159F MED LIST DOCD IN RCRD: CPT | Performed by: FAMILY MEDICINE

## 2023-01-27 PROCEDURE — G0439 PPPS, SUBSEQ VISIT: HCPCS | Performed by: FAMILY MEDICINE

## 2023-01-27 PROCEDURE — 99214 OFFICE O/P EST MOD 30 MIN: CPT | Performed by: FAMILY MEDICINE

## 2023-01-27 NOTE — PROGRESS NOTES
The ABCs of the Annual Wellness Visit  Subsequent Medicare Wellness Visit    Subjective    Amy L Sturgeon is a 52 y.o. female who presents for a Subsequent Medicare Wellness Visit.    The following portions of the patient's history were reviewed and   updated as appropriate: allergies, current medications, past family history, past medical history, past social history, past surgical history and problem list.     Compared to one year ago, the patient feels her physical   health is better.    Compared to one year ago, the patient feels her mental   health is better.    Recent Hospitalizations:  She was not admitted to the hospital during the last year.       Current Medical Providers:  Patient Care Team:  Rosa Elena Turcios MD as PCP - General (Family Medicine)  Mariluz Avila as Technologist    Outpatient Medications Prior to Visit   Medication Sig Dispense Refill   • albuterol sulfate  (90 Base) MCG/ACT inhaler Inhale 2 puffs Every 4 (Four) Hours As Needed for Wheezing. (Patient taking differently: Inhale 2 puffs Every 4 (Four) Hours As Needed for Wheezing. Use if needed bring with) 24 g 3   • atorvastatin (LIPITOR) 10 MG tablet TAKE 1 TABLET BY MOUTH AT  NIGHT 90 tablet 3   • DULoxetine (CYMBALTA) 60 MG capsule Take 1 capsule by mouth Daily. 90 capsule 3   • Estrogens Conjugated (Premarin) 0.625 MG/GM vaginal cream 1 gm nightly for 1 week then reduce to 1 gm 3 times weekly 42.5 g 2   • gabapentin (NEURONTIN) 800 MG tablet TAKE 1 TABLET BY MOUTH 3  TIMES DAILY (MAY TAKE UP TO 4 TIMES DAILY IF NEEDED) (Patient taking differently: Take 800 mg by mouth 3 (Three) Times a Day. May take 4th dose if needed) 360 tablet 3   • hydrOXYzine pamoate (VISTARIL) 25 MG capsule TAKE 1 CAPSULE BY MOUTH THREE TIMES DAILY AS NEEDED FOR ANXIETY (Patient taking differently: Take 25 mg by mouth 3 (Three) Times a Day As Needed.) 30 capsule 0   • losartan (COZAAR) 25 MG tablet TAKE 1 TABLET BY MOUTH DAILY 90 tablet 0   •  meloxicam (MOBIC) 15 MG tablet TAKE 1 TABLET BY MOUTH  DAILY (Patient taking differently: Take 15 mg by mouth Daily. None preop) 90 tablet 3   • montelukast (SINGULAIR) 10 MG tablet TAKE 1 TABLET BY MOUTH AT  NIGHT 90 tablet 3   • omeprazole (priLOSEC) 40 MG capsule TAKE 1 CAPSULE BY MOUTH  DAILY (Patient taking differently: Take 40 mg by mouth Every Night.) 90 capsule 3   • polyethylene glycol (MiraLax) 17 GM/SCOOP powder Take 17 g by mouth Daily. (Patient taking differently: Take 17 g by mouth As Needed. None preop) 850 g 3   • traZODone (DESYREL) 50 MG tablet TAKE 1 TABLET BY MOUTH AT  NIGHT 90 tablet 0   • phentermine (ADIPEX-P) 37.5 MG tablet Take 1 tablet by mouth Every Morning Before Breakfast. 30 tablet 3     No facility-administered medications prior to visit.       No opioid medication identified on active medication list. I have reviewed chart for other potential  high risk medication/s and harmful drug interactions in the elderly.          Aspirin is not on active medication list.  Aspirin use is not indicated based on review of current medical condition/s. Risk of harm outweighs potential benefits.  .    Patient Active Problem List   Diagnosis   • Right tibial fracture   • Ulcerative colitis ([facility])   • Low back pain   • Irritable bowel syndrome   • Allergic rhinitis   • Sphincter of Oddi spasm   • Anxiety   • Depression   • Asthma   • Systemic lupus erythematosus arthritis ([facility])   • Fibromyalgia   • Fatigue   • Vestibular schwannoma ([facility])   • Hyperlipidemia   • Balance problems   • Generalized anxiety disorder   • Hypertension   • Insomnia   • Sensorineural hearing loss   • GERD (gastroesophageal reflux disease)   • Onychomycosis   • Vertigo   • Class 3 severe obesity in adult ([facility])   • B12 deficiency   • MARY CARMEN treated with BiPAP   • Hyperkalemia   • Weight loss   • Fibromyositis   • History of anxiety state   • Benign essential hypertension   • Seronegative arthritis   • Lupus ([facility])   • Sensorineural  "hearing loss (SNHL) of left ear with unrestricted hearing of right ear   • Osteoporosis   • History of tobacco use   • Class 1 obesity due to excess calories with serious comorbidity and body mass index (BMI) of 31.0 to 31.9 in adult   • Adjustment disorder with depressed mood   • Abdominal pain   • Elevated lipase   • High aspartate transaminase measurement   • Family history of malignant neoplasm of breast in first degree relative   • Medicare annual wellness visit, subsequent     Advance Care Planning  Advance Directive is not on file.  ACP discussion was held with the patient during this visit. Patient does not have an advance directive, declines further assistance.     Objective    Vitals:    23 1017   BP: 128/72   BP Location: Right arm   Patient Position: Sitting   Cuff Size: Adult   Pulse: 100   Resp: 18   Temp: 98 °F (36.7 °C)   TempSrc: Temporal   SpO2: 96%  Comment: room air   Weight: 87.5 kg (193 lb)   Height: 166.4 cm (65.5\")   PainSc: 0-No pain     Estimated body mass index is 31.63 kg/m² as calculated from the following:    Height as of this encounter: 166.4 cm (65.5\").    Weight as of this encounter: 87.5 kg (193 lb).    BMI is >= 30 and <35. (Class 1 Obesity). The following options were offered after discussion;: weight loss educational material (shared in after visit summary), exercise counseling/recommendations, nutrition counseling/recommendations and joining gym, walking and ab workouts    ATTENTION  What is the year: correct  What is the month of the year: correct  What is the day of the week?: correct  What is the date?: correct  MEMORY  Repeat address three times, only score third attempt: Fam Marroquin [address] [place] [address]: 7  HOW MANY ANIMALS DID THE PATIENT NAME  Verbal Fluency -- Animal Names (0-25): 22+  CLOCK DRAWING  Clock Drawing: All Correct  MEMORY RECALL  Tell me what you remember about that name and address we were repeating at the beginnin  ACE TOTAL " SCORE  Total ACE Score - <25/30 strongly suggests cognitive impairment; <21/30 almost certainly shows dementia: 30      Does the patient have evidence of cognitive impairment? No          HEALTH RISK ASSESSMENT    Smoking Status:  Social History     Tobacco Use   Smoking Status Former   • Packs/day: 0.50   • Years: 8.00   • Pack years: 4.00   • Types: Cigarettes   • Start date: 1987   • Quit date: 1995   • Years since quittin.0   Smokeless Tobacco Never     Alcohol Consumption:  Social History     Substance and Sexual Activity   Alcohol Use Yes   • Alcohol/week: 1.0 standard drink   • Types: 1 Glasses of wine per week    Comment: Social     Fall Risk Screen:    YUMIKOADI Fall Risk Assessment was completed, and patient is at MODERATE risk for falls. Assessment completed on:2023    Depression Screening:  PHQ-2/PHQ-9 Depression Screening 2023   Little Interest or Pleasure in Doing Things 0-->not at all   Feeling Down, Depressed or Hopeless 0-->not at all   PHQ-9: Brief Depression Severity Measure Score 0       Health Habits and Functional and Cognitive Screening:  Functional & Cognitive Status 2023   Do you have difficulty preparing food and eating? No   Do you have difficulty bathing yourself, getting dressed or grooming yourself? No   Do you have difficulty using the toilet? No   Do you have difficulty moving around from place to place? No   Do you have trouble with steps or getting out of a bed or a chair? No   Current Diet Low Carb Diet   Dental Exam Up to date   Eye Exam Up to date   Exercise (times per week) 7 times per week   Current Exercises Include Walking   Do you need help using the phone?  No   Are you deaf or do you have serious difficulty hearing?  No   Do you need help with transportation? No   Do you need help shopping? No   Do you need help preparing meals?  No   Do you need help with housework?  No   Do you need help with laundry? No   Do you need help taking your  medications? No   Do you need help managing money? No   Do you ever drive or ride in a car without wearing a seat belt? No   Have you felt unusual stress, anger or loneliness in the last month? No   Who do you live with? Spouse   If you need help, do you have trouble finding someone available to you? No   Have you been bothered in the last four weeks by sexual problems? No   Do you have difficulty concentrating, remembering or making decisions? Yes       Age-appropriate Screening Schedule:  Refer to the list below for future screening recommendations based on patient's age, sex and/or medical conditions. Orders for these recommended tests are listed in the plan section. The patient has been provided with a written plan.    Health Maintenance   Topic Date Due   • TDAP/TD VACCINES (1 - Tdap) Never done   • ZOSTER VACCINE (1 of 2) Never done   • DXA SCAN  11/05/2021   • PAP SMEAR  07/23/2022   • LIPID PANEL  12/18/2022   • MAMMOGRAM  06/01/2024   • INFLUENZA VACCINE  Completed                CMS Preventative Services Quick Reference  Risk Factors Identified During Encounter  Immunizations Discussed/Encouraged: Tdap, Prevnar 20 (Pneumococcal 20-valent conjugate) and Shingrix  The above risks/problems have been discussed with the patient.  Pertinent information has been shared with the patient in the After Visit Summary.  An After Visit Summary and PPPS were made available to the patient.    Follow Up:   Next Medicare Wellness visit to be scheduled in 1 year.       Additional E&M Note during same encounter follows:  Patient has multiple medical problems which are significant and separately identifiable that require additional work above and beyond the Medicare Wellness Visit.      Chief Complaint  Medicare Wellness-subsequent and Gynecologic Exam    Subjective        HPI  Amy L Sturgeon is also being seen today for pap smear.  Last pap was 07/23/2019.  Diagnostic mammogram 6/1/2022 BI-RADS 1 repeat 1 year  DEXA scan  "11/5/2019 completely normal.    Patient reports she had a hysterectomy and bilateral salpingo-oophorectomy many years ago.  She reported vaginal dryness and pain with intercourse at appointment 2 months ago.  She was started on Premarin cream, she has only been using this twice a week about 3 times a week states dryness is about 50% better.    Patient also reports she was sick 2 weeks ago she had lymph nodes in her left neck, lung congestion, sores in her nose and dryness, white patches in her throat.She was seen by a telehealth visit through her work.  She was prescribed ipratropium and naproxen, no antibiotics.  She reports her throat is still feeling dry.  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         Objective   Vital Signs:  /72 (BP Location: Right arm, Patient Position: Sitting, Cuff Size: Adult)   Pulse 100   Temp 98 °F (36.7 °C) (Temporal)   Resp 18   Ht 166.4 cm (65.5\")   Wt 87.5 kg (193 lb)   SpO2 96% Comment: room air  BMI 31.63 kg/m²     Physical Exam  Vitals and nursing note reviewed.   Constitutional:       General: She is not in acute distress.     Appearance: She is well-developed.   HENT:      Head: Normocephalic and atraumatic.      Right Ear: External ear normal.      Left Ear: External ear normal.      Nose: Nose normal.   Eyes:      General: No scleral icterus.        Right eye: No discharge.         Left eye: No discharge.      Conjunctiva/sclera: Conjunctivae normal.      Pupils: Pupils are equal, round, and reactive to light.   Cardiovascular:      Rate and Rhythm: Normal rate and regular rhythm.      Heart sounds: No murmur heard.  Pulmonary:      Effort: Pulmonary effort is normal.      Breath sounds: No wheezing.   Abdominal:      General: Bowel sounds are normal. There is no distension.      Palpations: Abdomen is soft. There is no mass.      Tenderness: There is no abdominal tenderness.      Hernia: No " hernia is present.   Genitourinary:     Vagina: Normal. No vaginal discharge.      Adnexa:         Right: No tenderness.          Left: No tenderness.        Comments: Pelvic exam: VULVA: normal appearing vulva with no masses, tenderness or lesions, VAGINA: normal appearing vagina with normal color and discharge, no lesions, atrophic, introitus is narrow, mild difficulty inserting medium size speculum but patient did not complain of any discomfort, CERVIX: normal appearing cervix without discharge or lesions, surgically absent, ADNEXA: no masses, surgically absent bilateral, no palpable internal organs, PAP: Pap smear done today, thin-prep method.    Pap obtained  Musculoskeletal:         General: Normal range of motion.      Cervical back: Normal range of motion.   Lymphadenopathy:      Cervical: No cervical adenopathy.   Skin:     General: Skin is warm and dry.   Neurological:      Mental Status: She is alert and oriented to person, place, and time.                         Assessment and Plan   Diagnoses and all orders for this visit:    1. Medicare annual wellness visit, subsequent (Primary)    2. Cervical cancer screening  -     IGP,CtNg,AptimaHPV,rfx16 / 18,45    3. Osteoporosis, unspecified osteoporosis type, unspecified pathological fracture presence  -     DEXA Bone Density Axial; Future    4. Need for pneumococcal vaccination  -     Pneumococcal Conjugate Vaccine 20-Valent All    5. Class 1 obesity due to excess calories with serious comorbidity and body mass index (BMI) of 31.0 to 31.9 in adult  Assessment & Plan:  Patient's (Body mass index is 31.63 kg/m².) indicates that they are obese (BMI >30) with health conditions that include hypertension, dyslipidemias and GERD . Weight is unchanged. BMI is is above average; BMI management plan is completed. We discussed portion control and increasing exercise.       6. Dyspareunia in female    7. Tight introitus    8. Introital dyspareunia    9. Atrophic  vaginitis    10. History of tobacco use    11. Encounter for screening mammogram for breast cancer  -     Mammo Screening Digital Tomosynthesis Bilateral With CAD; Future    12. Vestibular schwannoma (HCC)    13. Allergic rhinitis, unspecified seasonality, unspecified trigger    14. Menopause  -     DEXA Bone Density Axial; Future    15. Family history of breast cancer  -     Mammo Screening Digital Tomosynthesis Bilateral With CAD; Future    The patient was counseled regarding nutrition, physical activity, healthy weight, injury prevention, immunizations and preventative health screenings.   Prevnar 20 today.  Do recommend Tdap and Shingrix today at pharmacy    She would due for mammogram in June, will do DEXA scan with mammogram at patient's request at [facility]    Dyspareunia with atrophic vaginitis and tight introitus, advised vaginal dilators, personal lubricants, and offered referral to physical therapy or gynecology which she declined today.  Do recommend increasing Premarin cream to 3 times weekly.        Follow Up   Return in about 6 months (around 7/27/2023) for Recheck, htn.  Patient was given instructions and counseling regarding her condition or for health maintenance advice. Please see specific information pulled into the AVS if appropriate.

## 2023-01-27 NOTE — ASSESSMENT & PLAN NOTE
Patient's (Body mass index is 31.63 kg/m².) indicates that they are obese (BMI >30) with health conditions that include hypertension, dyslipidemias and GERD . Weight is unchanged. BMI is is above average; BMI management plan is completed. We discussed portion control and increasing exercise.

## 2023-02-01 LAB
C TRACH RRNA CVX QL NAA+PROBE: NEGATIVE
CYTOLOGIST CVX/VAG CYTO: NORMAL
CYTOLOGY CVX/VAG DOC CYTO: NORMAL
CYTOLOGY CVX/VAG DOC THIN PREP: NORMAL
DX ICD CODE: NORMAL
HIV 1 & 2 AB SER-IMP: NORMAL
HPV GENOTYPE REFLEX: NORMAL
HPV I/H RISK 4 DNA CVX QL PROBE+SIG AMP: NEGATIVE
N GONORRHOEA RRNA CVX QL NAA+PROBE: NEGATIVE
OTHER STN SPEC: NORMAL
STAT OF ADQ CVX/VAG CYTO-IMP: NORMAL

## 2023-02-22 DIAGNOSIS — F51.01 PRIMARY INSOMNIA: ICD-10-CM

## 2023-02-26 RX ORDER — TRAZODONE HYDROCHLORIDE 50 MG/1
TABLET ORAL
Qty: 90 TABLET | Refills: 3 | Status: SHIPPED | OUTPATIENT
Start: 2023-02-26

## 2023-04-08 DIAGNOSIS — I10 ESSENTIAL HYPERTENSION: ICD-10-CM

## 2023-04-10 RX ORDER — LOSARTAN POTASSIUM 25 MG/1
25 TABLET ORAL DAILY
Qty: 90 TABLET | Refills: 1 | Status: SHIPPED | OUTPATIENT
Start: 2023-04-10

## 2023-05-01 ENCOUNTER — TELEPHONE (OUTPATIENT)
Dept: FAMILY MEDICINE CLINIC | Facility: CLINIC | Age: 53
End: 2023-05-01

## 2023-05-01 DIAGNOSIS — I10 PRIMARY HYPERTENSION: ICD-10-CM

## 2023-05-01 DIAGNOSIS — E78.2 MIXED HYPERLIPIDEMIA: ICD-10-CM

## 2023-05-01 DIAGNOSIS — R73.09 ELEVATED HEMOGLOBIN A1C: Primary | ICD-10-CM

## 2023-05-01 DIAGNOSIS — E53.8 B12 DEFICIENCY: ICD-10-CM

## 2023-05-01 NOTE — TELEPHONE ENCOUNTER
Caller: Sturgeon, Amy L    Relationship: Self    Best call back number:[phone]    What is the best time to reach you: ANY     Who are you requesting to speak with (clinical staff, provider,  specific staff member): CLINICAL STAFF       What was the call regarding: NEEDING LABS FAXED TO [facility]. PLEASE FAX -[phone]    Do you require a callback: YES

## 2023-05-01 NOTE — TELEPHONE ENCOUNTER
I have placed order, please fax to [facility] as requested.  Please cancel previous orders.

## 2023-05-28 DIAGNOSIS — M54.16 LUMBAR BACK PAIN WITH RADICULOPATHY AFFECTING RIGHT LOWER EXTREMITY: ICD-10-CM

## 2023-05-28 DIAGNOSIS — K21.9 GASTROESOPHAGEAL REFLUX DISEASE WITHOUT ESOPHAGITIS: ICD-10-CM

## 2023-05-28 DIAGNOSIS — M79.10 MYALGIA: ICD-10-CM

## 2023-05-30 RX ORDER — OMEPRAZOLE 40 MG/1
40 CAPSULE, DELAYED RELEASE ORAL NIGHTLY
Qty: 100 CAPSULE | Refills: 3 | Status: SHIPPED | OUTPATIENT
Start: 2023-05-30

## 2023-05-30 RX ORDER — MELOXICAM 15 MG/1
15 TABLET ORAL DAILY
Qty: 100 TABLET | Refills: 3 | Status: SHIPPED | OUTPATIENT
Start: 2023-05-30

## 2023-07-22 DIAGNOSIS — E78.2 MIXED HYPERLIPIDEMIA: ICD-10-CM

## 2023-07-24 RX ORDER — ATORVASTATIN CALCIUM 10 MG/1
TABLET, FILM COATED ORAL
Qty: 100 TABLET | Refills: 2 | OUTPATIENT
Start: 2023-07-24

## 2023-07-28 RX ORDER — GABAPENTIN 800 MG/1
TABLET ORAL
Qty: 360 TABLET | Refills: 1 | Status: SHIPPED | OUTPATIENT
Start: 2023-07-28

## 2023-08-18 DIAGNOSIS — E66.01 MORBID (SEVERE) OBESITY DUE TO EXCESS CALORIES: ICD-10-CM

## 2023-08-18 RX ORDER — PHENTERMINE HYDROCHLORIDE 37.5 MG/1
TABLET ORAL
Qty: 30 TABLET | Refills: 0 | OUTPATIENT
Start: 2023-08-18

## 2023-08-18 NOTE — TELEPHONE ENCOUNTER
Sent 90-day supply with 1 refill on 7/7/2023 to [facility]         E-Prescribing Status: Receipt confirmed by pharmacy

## 2023-08-21 RX ORDER — ALBUTEROL SULFATE 90 UG/1
AEROSOL, METERED RESPIRATORY (INHALATION)
Qty: 40.2 G | Refills: 3 | Status: SHIPPED | OUTPATIENT
Start: 2023-08-21

## 2023-08-31 DIAGNOSIS — J45.40 MODERATE PERSISTENT ASTHMA, UNSPECIFIED WHETHER COMPLICATED: ICD-10-CM

## 2023-09-01 RX ORDER — MONTELUKAST SODIUM 10 MG/1
TABLET ORAL
Qty: 100 TABLET | Refills: 2 | Status: SHIPPED | OUTPATIENT
Start: 2023-09-01

## 2023-09-12 ENCOUNTER — OFFICE VISIT (OUTPATIENT)
Dept: FAMILY MEDICINE CLINIC | Facility: CLINIC | Age: 53
End: 2023-09-12
Payer: MEDICARE

## 2023-09-12 VITALS
BODY MASS INDEX: 32.78 KG/M2 | HEART RATE: 67 BPM | SYSTOLIC BLOOD PRESSURE: 134 MMHG | HEIGHT: 66 IN | DIASTOLIC BLOOD PRESSURE: 89 MMHG | WEIGHT: 204 LBS | OXYGEN SATURATION: 99 % | RESPIRATION RATE: 18 BRPM

## 2023-09-12 DIAGNOSIS — J30.2 SEASONAL ALLERGIES: ICD-10-CM

## 2023-09-12 DIAGNOSIS — R42 DIZZINESS: Primary | ICD-10-CM

## 2023-09-12 DIAGNOSIS — E03.9 HYPOTHYROIDISM, UNSPECIFIED TYPE: ICD-10-CM

## 2023-09-12 DIAGNOSIS — E55.9 VITAMIN D DEFICIENCY: ICD-10-CM

## 2023-09-12 DIAGNOSIS — E78.2 MIXED HYPERLIPIDEMIA: ICD-10-CM

## 2023-09-12 DIAGNOSIS — R73.09 ELEVATED HEMOGLOBIN A1C: ICD-10-CM

## 2023-09-12 DIAGNOSIS — R53.81 MALAISE AND FATIGUE: ICD-10-CM

## 2023-09-12 DIAGNOSIS — E53.8 B12 DEFICIENCY: ICD-10-CM

## 2023-09-12 DIAGNOSIS — H65.91 MIDDLE EAR EFFUSION, RIGHT: ICD-10-CM

## 2023-09-12 DIAGNOSIS — R53.83 MALAISE AND FATIGUE: ICD-10-CM

## 2023-09-12 DIAGNOSIS — J45.40 MODERATE PERSISTENT ASTHMA, UNSPECIFIED WHETHER COMPLICATED: ICD-10-CM

## 2023-09-12 DIAGNOSIS — D33.3 VESTIBULAR SCHWANNOMA: ICD-10-CM

## 2023-09-12 DIAGNOSIS — I10 PRIMARY HYPERTENSION: ICD-10-CM

## 2023-09-12 LAB
BILIRUB BLD-MCNC: NEGATIVE MG/DL
CLARITY, POC: CLEAR
COLOR UR: YELLOW
GLUCOSE UR STRIP-MCNC: NEGATIVE MG/DL
KETONES UR QL: NEGATIVE
LEUKOCYTE EST, POC: NEGATIVE
NITRITE UR-MCNC: NEGATIVE MG/ML
PH UR: 6.5 [PH] (ref 5–8)
PROT UR STRIP-MCNC: NEGATIVE MG/DL
RBC # UR STRIP: NEGATIVE /UL
SP GR UR: 1.01 (ref 1–1.03)
UROBILINOGEN UR QL: ABNORMAL

## 2023-09-12 PROCEDURE — 3075F SYST BP GE 130 - 139MM HG: CPT

## 2023-09-12 PROCEDURE — 99214 OFFICE O/P EST MOD 30 MIN: CPT

## 2023-09-12 PROCEDURE — 3079F DIAST BP 80-89 MM HG: CPT

## 2023-09-12 PROCEDURE — 81003 URINALYSIS AUTO W/O SCOPE: CPT

## 2023-09-12 RX ORDER — AZITHROMYCIN 250 MG/1
TABLET, FILM COATED ORAL
Qty: 6 TABLET | Refills: 0 | Status: SHIPPED | OUTPATIENT
Start: 2023-09-12

## 2023-09-12 NOTE — LETTER
September 12, 2023     Patient: Amy L Sturgeon   YOB: 1970   Date of Visit: 9/12/2023       To Whom It May Concern:    It is my medical opinion that Amy Sturgeon may return to work 9/13/2023. Please excuse her from Thursday 9/7/2023-9/12/2023            Sincerely,        JONEL Barahona    CC: No Recipients

## 2023-09-12 NOTE — PROGRESS NOTES
Office Note     Name: Amy L Sturgeon    : 1970     MRN: [id number]     Chief Complaint  Dizziness and Weakness - Generalized    Subjective     Amy L Sturgeon presents to [facility] for an acute complaint of dizziness and weakness after being sick last week .    History of Present Illness  Patient is here today for complaints of weakness and dizziness. She recently had a GI bug that began this past Thursday. She reports vomiting and diarrhea that lasted for 2 days.   Dizziness  This is a new problem. The current episode started in the past 7 days. The problem occurs intermittently. The problem has been waxing and waning. Associated symptoms include a fever (now resolved), vomiting (now resolved) and weakness. Pertinent negatives include no chest pain or coughing.     Last  episode of vomiting was four days ago.   She is eating a mild diet and tolerating ok.    She is being treated by [facility].   She is drinking an adequate amount of water and denies dehydration.    Of note, patient has not recently had blood work completed.  Patient agrees to follow-up with primary care provider pending lab results.    Current Outpatient Medications:     albuterol sulfate  (90 Base) MCG/ACT inhaler, INHALE 2 PUFFS BY MOUTH  EVERY 4 HOURS AS NEEDED FOR WHEEZING, Disp: 40.2 g, Rfl: 3    DULoxetine (CYMBALTA) 60 MG capsule, TAKE 1 CAPSULE BY MOUTH  DAILY, Disp: 90 capsule, Rfl: 0    gabapentin (NEURONTIN) 800 MG tablet, TAKE 1 TABLET BY MOUTH 3  TIMES DAILY (MAY TAKE UP TO 4 TIMES DAILY IF NEEDED), Disp: 360 tablet, Rfl: 1    hydrOXYzine pamoate (VISTARIL) 25 MG capsule, TAKE 1 CAPSULE BY MOUTH THREE TIMES DAILY AS NEEDED FOR ANXIETY (Patient taking differently: Take 1 capsule by mouth 3 (Three) Times a Day As Needed.), Disp: 30 capsule, Rfl: 0    losartan (COZAAR) 25 MG tablet, TAKE 1 TABLET BY MOUTH DAILY, Disp: 90 tablet, Rfl: 3    meloxicam (MOBIC) 15 MG tablet, TAKE 1 TABLET BY  MOUTH  DAILY, Disp: 100 tablet, Rfl: 3    montelukast (SINGULAIR) 10 MG tablet, TAKE 1 TABLET BY MOUTH AT NIGHT, Disp: 100 tablet, Rfl: 2    omeprazole (priLOSEC) 40 MG capsule, Take 1 capsule by mouth Every Night., Disp: 100 capsule, Rfl: 3    phentermine (ADIPEX-P) 37.5 MG tablet, Take 1 tablet by mouth Every Morning Before Breakfast., Disp: 90 tablet, Rfl: 1    traZODone (DESYREL) 50 MG tablet, TAKE 1 TABLET BY MOUTH AT NIGHT, Disp: 90 tablet, Rfl: 3    azithromycin (Zithromax Z-Zeus) 250 MG tablet, Take 2 tablets by mouth on day 1, then 1 tablet daily on days 2-5, Disp: 6 tablet, Rfl: 0    Allergies   Allergen Reactions    Iodinated Contrast Media Hives    Contrast Dye (Echo Or Unknown Ct/Mr) Hives, Itching and Swelling       Past Surgical History:   Procedure Laterality Date    CARDIOVASCULAR STRESS TEST  02/16/2012    CHOLECYSTECTOMY  03/24/2006    COLONOSCOPY  11/13/2008    DILATION AND CURETTAGE, DIAGNOSTIC / THERAPEUTIC  2003    ENDOSCOPY      ERCP N/A 10/20/2022    Procedure: ERCP WITH SPHICTEROTOMY and balloon clearnace of  common bile duct;  Surgeon: Eli Banks MD;  Location: [facility] ENDOSCOPY;  Service: Gastroenterology;  Laterality: N/A;  post- balloon clearance of bile duct     ERCP W/ PLASTIC STENT PLACEMENT  2012    HYSTERECTOMY  2005    LAPAROSCOPIC TUBAL LIGATION  1999        Family History   Problem Relation Age of Onset    Hypertension Mother     Breast cancer Mother     Heart disease Father     Diabetes Brother     Breast cancer Maternal Grandmother     Heart disease Maternal Grandmother     Heart disease Paternal Grandfather     Ovarian cancer Neg Hx             1/27/2023    10:21 AM   PHQ-2/PHQ-9 Depression Screening   Little Interest or Pleasure in Doing Things 0-->not at all   Feeling Down, Depressed or Hopeless 0-->not at all   PHQ-9: Brief Depression Severity Measure Score 0       Review of Systems   Constitutional:  Positive for fever (now resolved).   Eyes:  Negative for visual  "disturbance.   Respiratory:  Negative for cough, shortness of breath and wheezing.    Cardiovascular:  Negative for chest pain, palpitations and leg swelling.   Gastrointestinal:  Positive for vomiting (now resolved).   Allergic/Immunologic: Positive for environmental allergies. Negative for food allergies.   Neurological:  Positive for dizziness and weakness.   Psychiatric/Behavioral:  The patient is not nervous/anxious.      Objective     /89 (BP Location: Right arm, Patient Position: Sitting, Cuff Size: Adult)   Pulse 67   Resp 18   Ht 166.4 cm (65.51\")   Wt 92.5 kg (204 lb)   LMP  (LMP Unknown)   SpO2 99%   BMI 33.42 kg/m²     BP Readings from Last 2 Encounters:   09/12/23 134/89   07/07/23 110/82       Wt Readings from Last 2 Encounters:   09/12/23 92.5 kg (204 lb)   07/07/23 92.3 kg (203 lb 6.4 oz)       BMI is >= 30 and <35. (Class 1 Obesity). The following options were offered after discussion;: exercise counseling/recommendations and nutrition counseling/recommendations         Physical Exam  Vitals and nursing note reviewed.   Constitutional:       General: She is not in acute distress.     Appearance: She is well-developed, well-groomed and overweight. She is not ill-appearing, toxic-appearing or diaphoretic.   HENT:      Head: Normocephalic and atraumatic.      Right Ear: Ear canal and external ear normal. A middle ear effusion is present. There is no impacted cerumen.      Left Ear: Ear canal and external ear normal.  No middle ear effusion. There is no impacted cerumen.      Nose: Nose normal. No congestion.      Mouth/Throat:      Mouth: Mucous membranes are moist.      Pharynx: No posterior oropharyngeal erythema.   Eyes:      Extraocular Movements: Extraocular movements intact.      Pupils: Pupils are equal, round, and reactive to light.   Cardiovascular:      Rate and Rhythm: Normal rate and regular rhythm.      Heart sounds: No murmur heard.  Pulmonary:      Effort: Pulmonary effort " is normal.      Breath sounds: Normal breath sounds. No wheezing.   Musculoskeletal:         General: Normal range of motion.      Cervical back: Neck supple.   Skin:     General: Skin is warm and dry.      Findings: No rash.   Neurological:      Mental Status: She is alert and oriented to person, place, and time.   Psychiatric:         Mood and Affect: Mood normal.         Behavior: Behavior normal.         Thought Content: Thought content normal.     Derm Physical Exam  Result Review :     Assessment and Plan         Diagnoses and all orders for this visit:    1. Dizziness (Primary)  Comments:  History of vestibular schwannoma.  Middle ear effusion on exam.  Patient reports dizziness somewhat improved.  Orders:  -     POC Urinalysis Dipstick, Multipro    2. Primary hypertension  -     CBC Auto Differential  -     Comprehensive Metabolic Panel  -     TSH  -     CBC & Differential    3. Malaise and fatigue  -     CBC Auto Differential  -     Comprehensive Metabolic Panel  -     Magnesium  -     Calcitriol (1,25 di-OH Vitamin D)  -     Vitamin B12 & Folate    4. Middle ear effusion, right  -     azithromycin (Zithromax Z-Zeus) 250 MG tablet; Take 2 tablets by mouth on day 1, then 1 tablet daily on days 2-5  Dispense: 6 tablet; Refill: 0    5. Moderate persistent asthma, unspecified whether complicated  Comments:  Compliant with plan of care.   Denies shortness of breath.    6. Vestibular schwannoma  Comments:  Under the care of [facility].    7. Hypothyroidism, unspecified type  -     TSH    8. Seasonal allergies  Comments:  Encouraged daily allergy medication.  Discussed efficacy and strength of over-the-counter medications.    9. Vitamin D deficiency  -     Calcitriol (1,25 di-OH Vitamin D)    10. Mixed hyperlipidemia  -     Lipid panel    11. Elevated hemoglobin A1c  -     Hemoglobin A1c    12. B12 deficiency  -     Vitamin B12 & Folate       Procedures    Problem List Items Addressed This Visit           Active Problems    Asthma    B12 deficiency    Relevant Orders    Vitamin B12 & Folate (Completed)    Hyperlipidemia    Relevant Orders    Lipid panel (Completed)    Hypertension    Relevant Orders    CBC Auto Differential    Comprehensive Metabolic Panel (Completed)    TSH (Completed)    CBC & Differential (Completed)    Vestibular schwannoma     Other Visit Diagnoses       Dizziness    -  Primary    History of vestibular schwannoma.  Middle ear effusion on exam.  Patient reports dizziness somewhat improved.    Relevant Orders    POC Urinalysis Dipstick, Multipro (Completed)    Malaise and fatigue        Relevant Orders    CBC Auto Differential    Comprehensive Metabolic Panel (Completed)    Magnesium (Completed)    Calcitriol (1,25 di-OH Vitamin D) (Completed)    Vitamin B12 & Folate (Completed)    Middle ear effusion, right        Relevant Medications    azithromycin (Zithromax Z-Zeus) 250 MG tablet    Hypothyroidism, unspecified type        Relevant Orders    TSH (Completed)    Seasonal allergies        Encouraged daily allergy medication.  Discussed efficacy and strength of over-the-counter medications.    Vitamin D deficiency        Relevant Orders    Calcitriol (1,25 di-OH Vitamin D) (Completed)    Elevated hemoglobin A1c        Relevant Orders    Hemoglobin A1c (Completed)                 Wrapup Tab  No follow-ups on file.     There are no Patient Instructions on file for this visit.   Patient was given instructions and counseling regarding her condition or for health maintenance advice. Please see specific information pulled into the AVS if appropriate.  Hand hygiene was performed during entrance to exam room and following assessment of patient. This document is intended for medical expert use only.     EMR Dragon/Transcription disclaimer:   Much of this encounter note is an electronic transcription/translation of spoken language to printed text. The electronic translation of spoken language may permit  erroneous, or at times, nonsensical words or phrases to be inadvertently transcribed.      JONEL Barahona, FNP-C  [address]  [facility]  [address] [place] [address] [address]  [place] [id number] [phone]

## 2023-09-14 LAB
1,25(OH)2D SERPL-MCNC: 40.7 PG/ML (ref 24.8–81.5)
ALBUMIN SERPL-MCNC: 4.3 G/DL (ref 3.8–4.9)
ALBUMIN/GLOB SERPL: 1.8 {RATIO} (ref 1.2–2.2)
ALP SERPL-CCNC: 86 IU/L (ref 44–121)
ALT SERPL-CCNC: 16 IU/L (ref 0–32)
AST SERPL-CCNC: 19 IU/L (ref 0–40)
BASOPHILS # BLD AUTO: 0.1 X10E3/UL (ref 0–0.2)
BASOPHILS NFR BLD AUTO: 1 %
BILIRUB SERPL-MCNC: 0.4 MG/DL (ref 0–1.2)
BUN SERPL-MCNC: 15 MG/DL (ref 6–24)
BUN/CREAT SERPL: 19 (ref 9–23)
CALCIUM SERPL-MCNC: 9.8 MG/DL (ref 8.7–10.2)
CHLORIDE SERPL-SCNC: 101 MMOL/L (ref 96–106)
CHOLEST SERPL-MCNC: 186 MG/DL (ref 100–199)
CO2 SERPL-SCNC: 27 MMOL/L (ref 20–29)
CREAT SERPL-MCNC: 0.77 MG/DL (ref 0.57–1)
EGFRCR SERPLBLD CKD-EPI 2021: 93 ML/MIN/1.73
EOSINOPHIL # BLD AUTO: 0.1 X10E3/UL (ref 0–0.4)
EOSINOPHIL NFR BLD AUTO: 1 %
ERYTHROCYTE [DISTWIDTH] IN BLOOD BY AUTOMATED COUNT: 13 % (ref 11.7–15.4)
FOLATE SERPL-MCNC: 4.6 NG/ML
GLOBULIN SER CALC-MCNC: 2.4 G/DL (ref 1.5–4.5)
GLUCOSE SERPL-MCNC: 89 MG/DL (ref 70–99)
HBA1C MFR BLD: 5.4 % (ref 4.8–5.6)
HCT VFR BLD AUTO: 44.5 % (ref 34–46.6)
HDLC SERPL-MCNC: 58 MG/DL
HGB BLD-MCNC: 14.5 G/DL (ref 11.1–15.9)
IMM GRANULOCYTES # BLD AUTO: 0 X10E3/UL (ref 0–0.1)
IMM GRANULOCYTES NFR BLD AUTO: 0 %
LDLC SERPL CALC-MCNC: 107 MG/DL (ref 0–99)
LYMPHOCYTES # BLD AUTO: 2.6 X10E3/UL (ref 0.7–3.1)
LYMPHOCYTES NFR BLD AUTO: 37 %
MAGNESIUM SERPL-MCNC: 2.3 MG/DL (ref 1.6–2.3)
MCH RBC QN AUTO: 28.8 PG (ref 26.6–33)
MCHC RBC AUTO-ENTMCNC: 32.6 G/DL (ref 31.5–35.7)
MCV RBC AUTO: 88 FL (ref 79–97)
MONOCYTES # BLD AUTO: 0.5 X10E3/UL (ref 0.1–0.9)
MONOCYTES NFR BLD AUTO: 7 %
NEUTROPHILS # BLD AUTO: 3.9 X10E3/UL (ref 1.4–7)
NEUTROPHILS NFR BLD AUTO: 54 %
PLATELET # BLD AUTO: 319 X10E3/UL (ref 150–450)
POTASSIUM SERPL-SCNC: 4.7 MMOL/L (ref 3.5–5.2)
PROT SERPL-MCNC: 6.7 G/DL (ref 6–8.5)
RBC # BLD AUTO: 5.04 X10E6/UL (ref 3.77–5.28)
SODIUM SERPL-SCNC: 139 MMOL/L (ref 134–144)
TRIGL SERPL-MCNC: 119 MG/DL (ref 0–149)
TSH SERPL DL<=0.005 MIU/L-ACNC: 1.32 UIU/ML (ref 0.45–4.5)
VIT B12 SERPL-MCNC: 474 PG/ML (ref 232–1245)
VLDLC SERPL CALC-MCNC: 21 MG/DL (ref 5–40)
WBC # BLD AUTO: 7.2 X10E3/UL (ref 3.4–10.8)

## 2023-09-18 NOTE — PROGRESS NOTES
Chief Complaint  Earache, Heartburn, and Weight Management    History of Present Illness  Amy L Sturgeon presents today for follow-up on multiple chronic continue her issues, states biggest concern is fatigue.     Patient is follow up after seeing JONEL Barahona on 09/12/2023.   Patient was dx with right middle ear effusion.     finished z-morgan.   Has recently started zyrtec.   Dizziness has resolved.   Patient does have history of acoustic neuroma.  She reports that it is growing and they are scheduling her for excision in January at [facility].    GERD  Patient reports more GERD recently.   Is currently taking Omeprazole 40 mg for treatment daily, and take famotidine 20 mg BID PRN.   GERD does awaken patient from sleep.   Patient did have an ERCP with Dr. Banks on 10/20/2022 for right upper quadrant pain with a dilated common bile duct.  Sphincterotomy and clearance of the bile duct was performed.  Had colonoscopy the month prior with Dr. Ramirez.  She is uncertain if she has future follow-up with GI/Dr. Banks.  She states she has continued to have reflux symptoms not controlled by single omeprazole daily.  She has been taking famotidine 20 mg up to twice daily fairly often.    Weight Management  Patient has been prescribed phentermine. Have been out for 2 months  Current diet is Low Calorie and sugar free  Last weight was on 07/07/2023 and weight was 203.6.  Patient is not exercising.   Have been feeling tired for about 4 weeks.         Patient Care Team:  Rosa Elena Turcios MD as PCP - General (Family Medicine)  Mariluz Avila as Technologist     Current Outpatient Medications on File Prior to Visit   Medication Sig    albuterol sulfate  (90 Base) MCG/ACT inhaler INHALE 2 PUFFS BY MOUTH  EVERY 4 HOURS AS NEEDED FOR WHEEZING    cetirizine (zyrTEC) 10 MG tablet Take 1 tablet by mouth Daily.    gabapentin (NEURONTIN) 800 MG tablet TAKE 1 TABLET BY MOUTH 3  TIMES DAILY (MAY TAKE UP TO 4  "TIMES DAILY IF NEEDED)    hydrOXYzine pamoate (VISTARIL) 25 MG capsule TAKE 1 CAPSULE BY MOUTH THREE TIMES DAILY AS NEEDED FOR ANXIETY (Patient taking differently: Take 1 capsule by mouth 3 (Three) Times a Day As Needed.)    losartan (COZAAR) 25 MG tablet TAKE 1 TABLET BY MOUTH DAILY    meloxicam (MOBIC) 15 MG tablet TAKE 1 TABLET BY MOUTH  DAILY    montelukast (SINGULAIR) 10 MG tablet TAKE 1 TABLET BY MOUTH AT NIGHT    omeprazole (priLOSEC) 40 MG capsule Take 1 capsule by mouth Every Night.    traZODone (DESYREL) 50 MG tablet TAKE 1 TABLET BY MOUTH AT NIGHT    [DISCONTINUED] DULoxetine (CYMBALTA) 60 MG capsule TAKE 1 CAPSULE BY MOUTH  DAILY    [DISCONTINUED] famotidine (PEPCID) 20 MG tablet Take 1 tablet by mouth 2 (Two) Times a Day. PRN    [DISCONTINUED] phentermine (ADIPEX-P) 37.5 MG tablet Take 1 tablet by mouth Every Morning Before Breakfast.    [DISCONTINUED] azithromycin (Zithromax Z-Zeus) 250 MG tablet Take 2 tablets by mouth on day 1, then 1 tablet daily on days 2-5 (Patient not taking: Reported on 9/19/2023)     No current facility-administered medications on file prior to visit.       Objective   Vital Signs:   /78 (BP Location: Right arm, Patient Position: Sitting, Cuff Size: Adult)   Pulse 67   Temp 98 °F (36.7 °C) (Temporal)   Resp 18   Ht 166.4 cm (65.5\")   Wt 94.6 kg (208 lb 8 oz)   SpO2 99%   BMI 34.17 kg/m²          Physical Exam  Vitals and nursing note reviewed.   Constitutional:       General: She is not in acute distress.     Appearance: She is well-developed.   HENT:      Head: Normocephalic and atraumatic.      Right Ear: Tympanic membrane, ear canal and external ear normal. There is no impacted cerumen.      Left Ear: Ear canal and external ear normal. There is no impacted cerumen.      Ears:      Comments: Left tympanic membrane is retracted  Cardiovascular:      Rate and Rhythm: Normal rate and regular rhythm.      Heart sounds: No murmur heard.  Pulmonary:      Effort: " Pulmonary effort is normal.      Breath sounds: Normal breath sounds. No wheezing.   Musculoskeletal:         General: Normal range of motion.   Skin:     General: Skin is warm and dry.      Findings: No rash.   Neurological:      Mental Status: She is alert and oriented to person, place, and time.          No visits with results within 1 Day(s) from this visit.   Latest known visit with results is:   Office Visit on 09/12/2023   Component Date Value Ref Range Status    Color 09/12/2023 Yellow  Yellow, Straw, Dark Yellow, Vivien Final    Clarity, UA 09/12/2023 Clear  Clear Final    Glucose, UA 09/12/2023 Negative  Negative mg/dL Final    Bilirubin 09/12/2023 Negative  Negative Final    Ketones, UA 09/12/2023 Negative  Negative Final    Specific Gravity  09/12/2023 1.010  1.005 - 1.030 Final    Blood, UA 09/12/2023 Negative  Negative Final    pH, Urine 09/12/2023 6.5  5.0 - 8.0 Final    Protein, POC 09/12/2023 Negative  Negative mg/dL Final    Urobilinogen, UA 09/12/2023 0.2 E.U./dL (A)  Normal, 0.2 E.U./dL Final    Nitrite, UA 09/12/2023 Negative  Negative Final    Leukocytes 09/12/2023 Negative  Negative Final    Glucose 09/12/2023 89  70 - 99 mg/dL Final    BUN 09/12/2023 15  6 - 24 mg/dL Final    Creatinine 09/12/2023 0.77  0.57 - 1.00 mg/dL Final    EGFR Result 09/12/2023 93  >59 mL/min/1.73 Final    BUN/Creatinine Ratio 09/12/2023 19  9 - 23 Final    Sodium 09/12/2023 139  134 - 144 mmol/L Final    Potassium 09/12/2023 4.7  3.5 - 5.2 mmol/L Final    Chloride 09/12/2023 101  96 - 106 mmol/L Final    Total CO2 09/12/2023 27  20 - 29 mmol/L Final    Calcium 09/12/2023 9.8  8.7 - 10.2 mg/dL Final    Total Protein 09/12/2023 6.7  6.0 - 8.5 g/dL Final    Albumin 09/12/2023 4.3  3.8 - 4.9 g/dL Final    Globulin 09/12/2023 2.4  1.5 - 4.5 g/dL Final    A/G Ratio 09/12/2023 1.8  1.2 - 2.2 Final    Total Bilirubin 09/12/2023 0.4  0.0 - 1.2 mg/dL Final    Alkaline Phosphatase 09/12/2023 86  44 - 121 IU/L Final    AST (SGOT)  09/12/2023 19  0 - 40 IU/L Final    ALT (SGPT) 09/12/2023 16  0 - 32 IU/L Final    TSH 09/12/2023 1.320  0.450 - 4.500 uIU/mL Final    Magnesium 09/12/2023 2.3  1.6 - 2.3 mg/dL Final    1,25-Dihydroxy, Vitamin D 09/12/2023 40.7  24.8 - 81.5 pg/mL Final    Vitamin B-12 09/12/2023 474  232 - 1,245 pg/mL Final    Folate 09/12/2023 4.6  >3.0 ng/mL Final    Comment: A serum folate concentration of less than 3.1 ng/mL is  considered to represent clinical deficiency.      Hemoglobin A1C 09/12/2023 5.4  4.8 - 5.6 % Final    Comment:          Prediabetes: 5.7 - 6.4           Diabetes: >6.4           Glycemic control for adults with diabetes: <7.0      Total Cholesterol 09/12/2023 186  100 - 199 mg/dL Final    Triglycerides 09/12/2023 119  0 - 149 mg/dL Final    HDL Cholesterol 09/12/2023 58  >39 mg/dL Final    VLDL Cholesterol [place] 09/12/2023 21  5 - 40 mg/dL Final    LDL Chol Calc (NIH) 09/12/2023 107 (H)  0 - 99 mg/dL Final    WBC 09/12/2023 7.2  3.4 - 10.8 x10E3/uL Final    RBC 09/12/2023 5.04  3.77 - 5.28 x10E6/uL Final    Hemoglobin 09/12/2023 14.5  11.1 - 15.9 g/dL Final    Hematocrit 09/12/2023 44.5  34.0 - 46.6 % Final    MCV 09/12/2023 88  79 - 97 fL Final    MCH 09/12/2023 28.8  26.6 - 33.0 pg Final    MCHC 09/12/2023 32.6  31.5 - 35.7 g/dL Final    RDW 09/12/2023 13.0  11.7 - 15.4 % Final    Platelets 09/12/2023 319  150 - 450 x10E3/uL Final    Neutrophil Rel % 09/12/2023 54  Not Estab. % Final    Lymphocyte Rel % 09/12/2023 37  Not Estab. % Final    Monocyte Rel % 09/12/2023 7  Not Estab. % Final    Eosinophil Rel % 09/12/2023 1  Not Estab. % Final    Basophil Rel % 09/12/2023 1  Not Estab. % Final    Neutrophils Absolute 09/12/2023 3.9  1.4 - 7.0 x10E3/uL Final    Lymphocytes Absolute 09/12/2023 2.6  0.7 - 3.1 x10E3/uL Final    Monocytes Absolute 09/12/2023 0.5  0.1 - 0.9 x10E3/uL Final    Eosinophils Absolute 09/12/2023 0.1  0.0 - 0.4 x10E3/uL Final    Basophils Absolute 09/12/2023 0.1  0.0 - 0.2 x10E3/uL Final     Immature Granulocyte Rel % 09/12/2023 0  Not Estab. % Final    Immature Grans Absolute 09/12/2023 0.0  0.0 - 0.1 x10E3/uL Final     A1C Last 3 Results          9/12/2023    13:37   HGBA1C Last 3 Results   Hemoglobin A1C 5.4      Lab Results   Component Value Date    CHLPL 186 09/12/2023    TRIG 119 09/12/2023    HDL 58 09/12/2023     (H) 09/12/2023     Lab Results   Component Value Date    TSH 1.320 09/12/2023     Lab Results   Component Value Date    GLUCOSE 89 09/12/2023    BUN 15 09/12/2023    CREATININE 0.77 09/12/2023    EGFRIFNONA 80 12/18/2021    EGFRIFAFRI 92 12/18/2021    BCR 19 09/12/2023    K 4.7 09/12/2023    CO2 27 09/12/2023    CALCIUM 9.8 09/12/2023    PROTENTOTREF 6.7 09/12/2023    ALBUMIN 4.3 09/12/2023    LABIL2 1.8 09/12/2023    AST 19 09/12/2023    ALT 16 09/12/2023     Lab Results   Component Value Date    WBC 7.2 09/12/2023    HGB 14.5 09/12/2023    HCT 44.5 09/12/2023    MCV 88 09/12/2023     09/12/2023         The 10-year ASCVD risk score (Christopher SANCHEZ, et al., 2019) is: 1.7%    Values used to calculate the score:      Age: 53 years      Sex: Female      Is Non- : No      Diabetic: No      Tobacco smoker: No      Systolic Blood Pressure: 128 mmHg      Is BP treated: Yes      HDL Cholesterol: 58 mg/dL      Total Cholesterol: 186 mg/dL             Assessment and Plan    Diagnoses and all orders for this visit:    1. Middle ear effusion, right (Primary)    2. Fatigue, unspecified type    3. Dizziness    4. Gastroesophageal reflux disease without esophagitis  -     famotidine (PEPCID) 20 MG tablet; Take 1 tablet by mouth 2 (Two) Times a Day. PRN  Dispense: 90 tablet; Refill: 0    5. Class 1 obesity due to excess calories with serious comorbidity and body mass index (BMI) of 34.0 to 34.9 in adult  Assessment & Plan:  Patient's (Body mass index is 34.17 kg/m².) indicates that they are obese (BMI >30) with health conditions that include obstructive sleep apnea,  hypertension, dyslipidemias, and GERD . Weight is unchanged. BMI  is above average; BMI management plan is completed. We discussed portion control and increasing exercise.       6. History of tobacco use    7. Morbid (severe) obesity due to excess calories  -     phentermine (ADIPEX-P) 37.5 MG tablet; Take 1 tablet by mouth Every Morning Before Breakfast.  Dispense: 90 tablet; Refill: 1    8. Muscle spasm of right leg    9. Vestibular schwannoma    10. Essential hypertension    11. Hypothyroidism, unspecified type    12. Mixed hyperlipidemia    13. Recurrent major depressive disorder, in partial remission  -     Discontinue: DULoxetine (CYMBALTA) 60 MG capsule; Take 1 capsule by mouth Daily.  Dispense: 100 capsule; Refill: 3  -     DULoxetine (CYMBALTA) 60 MG capsule; Take 1 capsule by mouth Daily.  Dispense: 100 capsule; Refill: 3    Dizziness and middle ear effusion resolved, left tympanic membrane retracted patient indicates this is chronic since her previous ear surgeries.  She is scheduled in January for excision of growing schwannoma.  At time of appointment with Sharlene Butcher 1 week ago it was noted she had not had any recent labs or recent follow-up.  She ordered extensive labs which are reviewed with patient today no abnormalities, labs within disorder treatment goals.  Advised there are no use that would lead to fatigue.  She denies problems sleeping depression is under control  Chronic depression stable renewing Cymbalta  Hypertension at goal continue Cozaar 25 mg  Reflux suboptimally controlled with omeprazole.  Patient requesting prescription for Pepcid advised agree for short use but need to [facility], Dr. Ramirez or Dr. Banks.    Patient reports she had a flu and Tdap vaccination on 9/5/2023 at [facility] as well as initial Shingrix on August 5, 2023 immunization record has been updated.    Obesity, patient has had weight gain since running out of phentermine, renewing  today.  Medications Discontinued During This Encounter   Medication Reason    azithromycin (Zithromax Z-Zeus) 250 MG tablet *Therapy completed    phentermine (ADIPEX-P) 37.5 MG tablet Reorder    famotidine (PEPCID) 20 MG tablet Reorder    azithromycin (Zithromax Z-Zeus) 250 MG tablet *Therapy completed    DULoxetine (CYMBALTA) 60 MG capsule Reorder    DULoxetine (CYMBALTA) 60 MG capsule Reorder         Follow Up     Return in about 4 months (around 1/19/2024) for Medicare Wellness and weight check.    Patient was given instructions and counseling regarding her condition or for health maintenance advice. Please see specific information pulled into the AVS if appropriate.

## 2023-09-19 ENCOUNTER — OFFICE VISIT (OUTPATIENT)
Dept: FAMILY MEDICINE CLINIC | Facility: CLINIC | Age: 53
End: 2023-09-19
Payer: MEDICARE

## 2023-09-19 VITALS
RESPIRATION RATE: 18 BRPM | OXYGEN SATURATION: 99 % | WEIGHT: 208.5 LBS | HEIGHT: 66 IN | SYSTOLIC BLOOD PRESSURE: 128 MMHG | DIASTOLIC BLOOD PRESSURE: 78 MMHG | HEART RATE: 67 BPM | BODY MASS INDEX: 33.51 KG/M2 | TEMPERATURE: 98 F

## 2023-09-19 DIAGNOSIS — R42 DIZZINESS: ICD-10-CM

## 2023-09-19 DIAGNOSIS — H65.91 MIDDLE EAR EFFUSION, RIGHT: Primary | ICD-10-CM

## 2023-09-19 DIAGNOSIS — K21.9 GASTROESOPHAGEAL REFLUX DISEASE WITHOUT ESOPHAGITIS: ICD-10-CM

## 2023-09-19 DIAGNOSIS — E66.09 CLASS 1 OBESITY DUE TO EXCESS CALORIES WITH SERIOUS COMORBIDITY AND BODY MASS INDEX (BMI) OF 34.0 TO 34.9 IN ADULT: ICD-10-CM

## 2023-09-19 DIAGNOSIS — Z87.891 HISTORY OF TOBACCO USE: ICD-10-CM

## 2023-09-19 DIAGNOSIS — D33.3 VESTIBULAR SCHWANNOMA: ICD-10-CM

## 2023-09-19 DIAGNOSIS — F33.41 RECURRENT MAJOR DEPRESSIVE DISORDER, IN PARTIAL REMISSION: ICD-10-CM

## 2023-09-19 DIAGNOSIS — E66.01 MORBID (SEVERE) OBESITY DUE TO EXCESS CALORIES: ICD-10-CM

## 2023-09-19 DIAGNOSIS — I10 ESSENTIAL HYPERTENSION: ICD-10-CM

## 2023-09-19 DIAGNOSIS — E78.2 MIXED HYPERLIPIDEMIA: ICD-10-CM

## 2023-09-19 DIAGNOSIS — E03.9 HYPOTHYROIDISM, UNSPECIFIED TYPE: ICD-10-CM

## 2023-09-19 DIAGNOSIS — M62.838 MUSCLE SPASM OF RIGHT LEG: ICD-10-CM

## 2023-09-19 DIAGNOSIS — R53.83 FATIGUE, UNSPECIFIED TYPE: ICD-10-CM

## 2023-09-19 PROCEDURE — 3078F DIAST BP <80 MM HG: CPT | Performed by: FAMILY MEDICINE

## 2023-09-19 PROCEDURE — 1159F MED LIST DOCD IN RCRD: CPT | Performed by: FAMILY MEDICINE

## 2023-09-19 PROCEDURE — 99214 OFFICE O/P EST MOD 30 MIN: CPT | Performed by: FAMILY MEDICINE

## 2023-09-19 PROCEDURE — 3074F SYST BP LT 130 MM HG: CPT | Performed by: FAMILY MEDICINE

## 2023-09-19 PROCEDURE — 1160F RVW MEDS BY RX/DR IN RCRD: CPT | Performed by: FAMILY MEDICINE

## 2023-09-19 RX ORDER — DULOXETIN HYDROCHLORIDE 60 MG/1
60 CAPSULE, DELAYED RELEASE ORAL DAILY
Qty: 100 CAPSULE | Refills: 3 | Status: SHIPPED | OUTPATIENT
Start: 2023-09-19

## 2023-09-19 RX ORDER — DULOXETIN HYDROCHLORIDE 60 MG/1
60 CAPSULE, DELAYED RELEASE ORAL DAILY
Qty: 100 CAPSULE | Refills: 3 | Status: SHIPPED | OUTPATIENT
Start: 2023-09-19 | End: 2023-09-19 | Stop reason: SDUPTHER

## 2023-09-19 RX ORDER — CETIRIZINE HYDROCHLORIDE 10 MG/1
10 TABLET ORAL DAILY
COMMUNITY

## 2023-09-19 RX ORDER — FAMOTIDINE 20 MG/1
20 TABLET, FILM COATED ORAL 2 TIMES DAILY
COMMUNITY
End: 2023-09-19 | Stop reason: SDUPTHER

## 2023-09-19 RX ORDER — FAMOTIDINE 20 MG/1
20 TABLET, FILM COATED ORAL 2 TIMES DAILY
Qty: 90 TABLET | Refills: 0 | Status: SHIPPED | OUTPATIENT
Start: 2023-09-19

## 2023-09-19 RX ORDER — PHENTERMINE HYDROCHLORIDE 37.5 MG/1
37.5 TABLET ORAL
Qty: 90 TABLET | Refills: 1 | Status: SHIPPED | OUTPATIENT
Start: 2023-09-19

## 2023-09-19 NOTE — ASSESSMENT & PLAN NOTE
Patient's (Body mass index is 34.17 kg/m².) indicates that they are obese (BMI >30) with health conditions that include obstructive sleep apnea, hypertension, dyslipidemias, and GERD . Weight is unchanged. BMI  is above average; BMI management plan is completed. We discussed portion control and increasing exercise.

## 2023-10-13 RX ORDER — GABAPENTIN 800 MG/1
TABLET ORAL
Qty: 400 TABLET | Refills: 0 | Status: SHIPPED | OUTPATIENT
Start: 2023-10-13

## 2023-10-25 DIAGNOSIS — K21.9 GASTROESOPHAGEAL REFLUX DISEASE WITHOUT ESOPHAGITIS: ICD-10-CM

## 2023-10-25 RX ORDER — FAMOTIDINE 20 MG/1
20 TABLET, FILM COATED ORAL 2 TIMES DAILY PRN
Qty: 90 TABLET | Refills: 0 | Status: SHIPPED | OUTPATIENT
Start: 2023-10-25

## 2023-12-27 RX ORDER — GABAPENTIN 800 MG/1
TABLET ORAL
Qty: 400 TABLET | Refills: 0 | Status: SHIPPED | OUTPATIENT
Start: 2023-12-27

## 2024-01-01 NOTE — TELEPHONE ENCOUNTER
----- Message from Rciardo Luke sent at 2024  8:11 AM EST -----  let guardian know the stool parasitology was negative      Please place referral as requested for spasm of sphincter of Oddi and screening colonoscopy

## 2024-01-12 ENCOUNTER — READMISSION MANAGEMENT (OUTPATIENT)
Dept: CALL CENTER | Facility: HOSPITAL | Age: 54
End: 2024-01-12
Payer: MEDICARE

## 2024-01-12 NOTE — OUTREACH NOTE
Prep Survey      Flowsheet Row Responses   [facility] [place], operated by [facility] facility patient discharged from? Non-BH   Is LACE score < 7 ? Non-BH Discharge   Eligibility [facility]   Date of Admission 01/09/24   Date of Discharge 01/12/24   Discharge diagnosis CRANIOTOMY TEMPORAL WITH EXCISION TISSUE   Does the patient have one of the following disease processes/diagnoses(primary or secondary)? General Surgery   Prep survey completed? Yes            Niyah DEY - Registered Nurse

## 2024-01-15 ENCOUNTER — TRANSITIONAL CARE MANAGEMENT TELEPHONE ENCOUNTER (OUTPATIENT)
Dept: CALL CENTER | Facility: HOSPITAL | Age: 54
End: 2024-01-15
Payer: MEDICARE

## 2024-01-15 NOTE — OUTREACH NOTE
Call Center TCM Note      Flowsheet Row Responses   [facility] patient discharged from? Non-BH   Does the patient have one of the following disease processes/diagnoses(primary or secondary)? Other   TCM attempt successful? No   Unsuccessful attempts Attempt 2            Marion Pak MA    1/15/2024, 16:32 EST

## 2024-01-15 NOTE — OUTREACH NOTE
Call Center TCM Note      Flowsheet Row Responses   [facility] patient discharged from? Non-BH   Does the patient have one of the following disease processes/diagnoses(primary or secondary)? Other   TCM attempt successful? No   Unsuccessful attempts Attempt 1            Marion Pak MA    1/15/2024, 16:06 EST

## 2024-01-16 ENCOUNTER — TRANSITIONAL CARE MANAGEMENT TELEPHONE ENCOUNTER (OUTPATIENT)
Dept: CALL CENTER | Facility: HOSPITAL | Age: 54
End: 2024-01-16
Payer: MEDICARE

## 2024-01-16 NOTE — OUTREACH NOTE
Call Center TCM Note      Flowsheet Row Responses   [facility] patient discharged from? Non-BH   Does the patient have one of the following disease processes/diagnoses(primary or secondary)? Other   TCM attempt successful? No   Unsuccessful attempts Attempt 3   Wrap up additional comments D/C DX: CRANIOTOMY TEMPORAL WITH EXCISION TISSUE - Unable to reach pt x 3 attempts for TCM call. Pt is sched for POST OP at [facility] 01/23/2024, and has TCM APPT with PCP Dr Rosa Elena Turcios 01/29/2024.            Marion Pak MA    1/16/2024, 15:07 EST

## 2024-01-19 NOTE — PROGRESS NOTES
Answers submitted by the patient for this visit:  Other (Submitted on 1/19/2024)  Please describe your symptoms.: Remove stitches  Have you had these symptoms before?: No  How long have you been having these symptoms?: 1-2 weeks  Primary Reason for Visit (Submitted on 1/19/2024)  What is the primary reason for your visit?: Other  Chief Complaint  Suture / Staple Removal    History of Present Illness  Amy L Sturgeon presents today for stitches removal after brain surgery on 1/9/24 at [facility] on 01/09/2024.     Patient is 2 weeks status post left acoustic neuroma removal via translabyrinthectomy approach on 1/9/2020 performed by Dr. Amin and Dr. Alcaraz at [facility].  Hospital admission 1/9 with discharge on 1/12/2024.  Patient is doing remarkably well.  Still feeling a little foggy headed and off balance but no other concerns.  She presents today to have sutures removed.    Have been on steroids 4 weeks, last dose today  Stopped phentermine 2 weeks prior to surgery.     Patient Care Team:  Rosa Elena Turcios MD as PCP - General (Family Medicine)  Mariluz Avila as Technologist   Current Outpatient Medications on File Prior to Visit   Medication Sig    albuterol sulfate  (90 Base) MCG/ACT inhaler INHALE 2 PUFFS BY MOUTH  EVERY 4 HOURS AS NEEDED FOR WHEEZING    amoxicillin-clavulanate (AUGMENTIN) 875-125 MG per tablet Take 1 tablet by mouth 2 (Two) Times a Day.    cetirizine (zyrTEC) 10 MG tablet Take 1 tablet by mouth Daily.    DULoxetine (CYMBALTA) 60 MG capsule Take 1 capsule by mouth Daily.    gabapentin (NEURONTIN) 800 MG tablet TAKE 1 TABLET BY MOUTH 3 TIMES  DAILY MAY TAKE UP TO 4 TIMES  DAILY IF NEEDED (Patient taking differently: Take 1 tablet by mouth 2 (Two) Times a Day.)    hydrOXYzine pamoate (VISTARIL) 25 MG capsule TAKE 1 CAPSULE BY MOUTH THREE TIMES DAILY AS NEEDED FOR ANXIETY (Patient taking differently: Take 1 capsule by mouth 3 (Three) Times a Day As  "Needed.)    losartan (COZAAR) 25 MG tablet TAKE 1 TABLET BY MOUTH DAILY    meloxicam (MOBIC) 15 MG tablet TAKE 1 TABLET BY MOUTH  DAILY    montelukast (SINGULAIR) 10 MG tablet TAKE 1 TABLET BY MOUTH AT NIGHT    omeprazole (priLOSEC) 40 MG capsule Take 1 capsule by mouth Every Night.    traZODone (DESYREL) 50 MG tablet TAKE 1 TABLET BY MOUTH AT NIGHT    chlorhexidine (PERIDEX) 0.12 % solution Apply 15 mL to the mouth or throat 2 (Two) Times a Day. (Patient not taking: Reported on 1/23/2024)    famotidine (PEPCID) 20 MG tablet Take 1 tablet by mouth twice daily as needed (Patient not taking: Reported on 1/23/2024)    Lidocaine Viscous HCl (XYLOCAINE) 2 % solution Dilute 5 to 10 ml in water, salt water ,or mouth wash and swish in mouth and spit 3-4 times daily. May apply small amount directly to ulcer prn (Patient not taking: Reported on 1/23/2024)    phentermine (ADIPEX-P) 37.5 MG tablet Take 1 tablet by mouth Every Morning Before Breakfast. (Patient not taking: Reported on 1/23/2024)     No current facility-administered medications on file prior to visit.       Objective   Vital Signs:   /62 (BP Location: Right arm, Patient Position: Sitting, Cuff Size: Large Adult)   Pulse 107   Temp 96.4 °F (35.8 °C) (Temporal)   Resp 18   Ht 166.4 cm (65.5\")   Wt 94.3 kg (208 lb)   SpO2 99%   BMI 34.09 kg/m²    BP Readings from Last 3 Encounters:   01/23/24 108/62   09/19/23 128/78   09/12/23 134/89     Wt Readings from Last 3 Encounters:   01/23/24 94.3 kg (208 lb)   09/19/23 94.6 kg (208 lb 8 oz)   09/12/23 92.5 kg (204 lb)         Physical Exam  Vitals and nursing note reviewed.   Constitutional:       General: She is not in acute distress.     Appearance: Normal appearance. She is well-developed. She is obese.   HENT:      Head: Normocephalic and atraumatic.      Right Ear: External ear normal.      Left Ear: External ear normal.   Eyes:      Conjunctiva/sclera: Conjunctivae normal.      Comments: Pupils are equal " and round   Pulmonary:      Effort: Pulmonary effort is normal.   Skin:     General: Skin is dry.      Comments: There is approximately 11 cm curvilinear surgical wound running suture around the posterior aspect of the right ear.  There is minimal crust, no erythema, no drainage.  There is mild mostly yellowish with minimal purple fading bruising around the lower portion of the incision.   Neurological:      Mental Status: She is alert and oriented to person, place, and time.      Comments: Minimal ataxia and unsteadiness to the right but able to catch herself easily without getting dangerously off balance   Psychiatric:         Mood and Affect: Mood normal.         Behavior: Behavior normal.         Thought Content: Thought content normal.            No visits with results within 1 Day(s) from this visit.   Latest known visit with results is:   Office Visit on 09/12/2023   Component Date Value Ref Range Status    Color 09/12/2023 Yellow  Yellow, Straw, Dark Yellow, Vivien Final    Clarity, UA 09/12/2023 Clear  Clear Final    Glucose, UA 09/12/2023 Negative  Negative mg/dL Final    Bilirubin 09/12/2023 Negative  Negative Final    Ketones, UA 09/12/2023 Negative  Negative Final    Specific Gravity  09/12/2023 1.010  1.005 - 1.030 Final    Blood, UA 09/12/2023 Negative  Negative Final    pH, Urine 09/12/2023 6.5  5.0 - 8.0 Final    Protein, POC 09/12/2023 Negative  Negative mg/dL Final    Urobilinogen, UA 09/12/2023 0.2 E.U./dL (A)  Normal, 0.2 E.U./dL Final    Nitrite, UA 09/12/2023 Negative  Negative Final    Leukocytes 09/12/2023 Negative  Negative Final    Glucose 09/12/2023 89  70 - 99 mg/dL Final    BUN 09/12/2023 15  6 - 24 mg/dL Final    Creatinine 09/12/2023 0.77  0.57 - 1.00 mg/dL Final    EGFR Result 09/12/2023 93  >59 mL/min/1.73 Final    BUN/Creatinine Ratio 09/12/2023 19  9 - 23 Final    Sodium 09/12/2023 139  134 - 144 mmol/L Final    Potassium 09/12/2023 4.7  3.5 - 5.2 mmol/L Final    Chloride  09/12/2023 101  96 - 106 mmol/L Final    Total CO2 09/12/2023 27  20 - 29 mmol/L Final    Calcium 09/12/2023 9.8  8.7 - 10.2 mg/dL Final    Total Protein 09/12/2023 6.7  6.0 - 8.5 g/dL Final    Albumin 09/12/2023 4.3  3.8 - 4.9 g/dL Final    Globulin 09/12/2023 2.4  1.5 - 4.5 g/dL Final    A/G Ratio 09/12/2023 1.8  1.2 - 2.2 Final    Total Bilirubin 09/12/2023 0.4  0.0 - 1.2 mg/dL Final    Alkaline Phosphatase 09/12/2023 86  44 - 121 IU/L Final    AST (SGOT) 09/12/2023 19  0 - 40 IU/L Final    ALT (SGPT) 09/12/2023 16  0 - 32 IU/L Final    TSH 09/12/2023 1.320  0.450 - 4.500 uIU/mL Final    Magnesium 09/12/2023 2.3  1.6 - 2.3 mg/dL Final    1,25-Dihydroxy, Vitamin D 09/12/2023 40.7  24.8 - 81.5 pg/mL Final    Vitamin B-12 09/12/2023 474  232 - 1,245 pg/mL Final    Folate 09/12/2023 4.6  >3.0 ng/mL Final    Comment: A serum folate concentration of less than 3.1 ng/mL is  considered to represent clinical deficiency.      Hemoglobin A1C 09/12/2023 5.4  4.8 - 5.6 % Final    Comment:          Prediabetes: 5.7 - 6.4           Diabetes: >6.4           Glycemic control for adults with diabetes: <7.0      Total Cholesterol 09/12/2023 186  100 - 199 mg/dL Final    Triglycerides 09/12/2023 119  0 - 149 mg/dL Final    HDL Cholesterol 09/12/2023 58  >39 mg/dL Final    VLDL Cholesterol [place] 09/12/2023 21  5 - 40 mg/dL Final    LDL Chol Calc (NIH) 09/12/2023 107 (H)  0 - 99 mg/dL Final    WBC 09/12/2023 7.2  3.4 - 10.8 x10E3/uL Final    RBC 09/12/2023 5.04  3.77 - 5.28 x10E6/uL Final    Hemoglobin 09/12/2023 14.5  11.1 - 15.9 g/dL Final    Hematocrit 09/12/2023 44.5  34.0 - 46.6 % Final    MCV 09/12/2023 88  79 - 97 fL Final    MCH 09/12/2023 28.8  26.6 - 33.0 pg Final    MCHC 09/12/2023 32.6  31.5 - 35.7 g/dL Final    RDW 09/12/2023 13.0  11.7 - 15.4 % Final    Platelets 09/12/2023 319  150 - 450 x10E3/uL Final    Neutrophil Rel % 09/12/2023 54  Not Estab. % Final    Lymphocyte Rel % 09/12/2023 37  Not Estab. % Final    Monocyte  Rel % 09/12/2023 7  Not Estab. % Final    Eosinophil Rel % 09/12/2023 1  Not Estab. % Final    Basophil Rel % 09/12/2023 1  Not Estab. % Final    Neutrophils Absolute 09/12/2023 3.9  1.4 - 7.0 x10E3/uL Final    Lymphocytes Absolute 09/12/2023 2.6  0.7 - 3.1 x10E3/uL Final    Monocytes Absolute 09/12/2023 0.5  0.1 - 0.9 x10E3/uL Final    Eosinophils Absolute 09/12/2023 0.1  0.0 - 0.4 x10E3/uL Final    Basophils Absolute 09/12/2023 0.1  0.0 - 0.2 x10E3/uL Final    Immature Granulocyte Rel % 09/12/2023 0  Not Estab. % Final    Immature Grans Absolute 09/12/2023 0.0  0.0 - 0.1 x10E3/uL Final     A1C Last 3 Results          9/12/2023    13:37   HGBA1C Last 3 Results   Hemoglobin A1C 5.4      Lab Results   Component Value Date    CHLPL 186 09/12/2023    TRIG 119 09/12/2023    HDL 58 09/12/2023     (H) 09/12/2023     Lab Results   Component Value Date    TSH 1.320 09/12/2023     Lab Results   Component Value Date    GLUCOSE 89 09/12/2023    BUN 16 01/10/2024    CREATININE 0.83 01/10/2024    EGFRIFNONA 80 12/18/2021    EGFRIFAFRI 92 12/18/2021    BCR 19 09/12/2023    K 4.2 01/10/2024    CO2 25 01/10/2024    CALCIUM 9.5 01/10/2024    PROTENTOTREF 6.7 09/12/2023    ALBUMIN 4.3 09/12/2023    LABIL2 1.8 09/12/2023    AST 19 09/12/2023    ALT 16 09/12/2023     Lab Results   Component Value Date    WBC 7.2 09/12/2023    HGB 14.5 09/12/2023    HCT 44.5 09/12/2023    MCV 88 09/12/2023     09/12/2023   In addition to labs resulted above patient has had extensive labs at [place] on November 16 and December 29 viewable through care everywhere.          Suture Removal    Date/Time: 1/23/2024 3:36 PM    Performed by: Rosa Elena Turcios MD  Authorized by: Rosa Elena Turcios MD  Body area: head/neck  Location details: scalp  Wound Appearance: clean  Patient tolerance: patient tolerated the procedure well with no immediate complications  Comments: Running suture in approximately 14 passes removed in  approximately 7 sections.       Small amount of crust was removed with eduardo soap wipe followed by saline saturated guaze to remove any residue.        Assessment and Plan    Diagnoses and all orders for this visit:    1. S/P excision of acoustic neuroma (Primary)    2. Visit for suture removal    3. Class 1 obesity due to excess calories with serious comorbidity and body mass index (BMI) of 34.0 to 34.9 in adult    4. History of tobacco use    Other orders  -     Suture Removal      2 weeks status post removal of acoustic neuroma sutures removed as above she will follow-up with [facility] doctors and scheduled in a couple weeks.  She is going to send pictures of post suture removal for their information.    Obesity, patient has been off of phentermine and treated with steroids for the past month.  She is gaining weight Pendyla like to restart phentermine.  I am okay with her restarting soon as she has clearance from surgical team at [facility].    There are no discontinued medications.      Follow Up     Return in 6 days (on 1/29/2024) for Annual physical, as previously scheduled.    Patient was given instructions and counseling regarding her condition or for health maintenance advice. Please see specific information pulled into the AVS if appropriate.

## 2024-01-22 NOTE — PROGRESS NOTES
The ABCs of the Annual Wellness Visit  Subsequent Medicare Wellness Visit    Subjective    Amy L Sturgeon is a 53 y.o. female who presents for a Subsequent Medicare Wellness Visit.    The following portions of the patient's history were reviewed and   updated as appropriate: allergies, current medications, past family history, past medical history, past social history, past surgical history, and problem list.    Compared to one year ago, the patient feels her physical   health is the same.    Compared to one year ago, the patient feels her mental   health is the same.    Recent Hospitalizations:  She was admitted within the past 365 days at [facility].       Current Medical Providers:  Patient Care Team:  Rosa Elena Turcios MD as PCP - General (Family Medicine)  Mariluz Avila as Technologist    Outpatient Medications Prior to Visit   Medication Sig Dispense Refill    albuterol sulfate  (90 Base) MCG/ACT inhaler INHALE 2 PUFFS BY MOUTH  EVERY 4 HOURS AS NEEDED FOR WHEEZING 40.2 g 3    chlorhexidine (PERIDEX) 0.12 % solution Apply 15 mL to the mouth or throat 2 (Two) Times a Day. 473 mL 0    gabapentin (NEURONTIN) 800 MG tablet TAKE 1 TABLET BY MOUTH 3 TIMES  DAILY MAY TAKE UP TO 4 TIMES  DAILY IF NEEDED (Patient taking differently: Take 1 tablet by mouth 2 (Two) Times a Day.) 400 tablet 0    Lidocaine Viscous HCl (XYLOCAINE) 2 % solution Dilute 5 to 10 ml in water, salt water ,or mouth wash and swish in mouth and spit 3-4 times daily. May apply small amount directly to ulcer prn 100 mL 0    cetirizine (zyrTEC) 10 MG tablet Take 1 tablet by mouth Daily.      DULoxetine (CYMBALTA) 60 MG capsule Take 1 capsule by mouth Daily. 100 capsule 3    hydrOXYzine pamoate (VISTARIL) 25 MG capsule TAKE 1 CAPSULE BY MOUTH THREE TIMES DAILY AS NEEDED FOR ANXIETY (Patient taking differently: Take 1 capsule by mouth 3 (Three) Times a Day As Needed.) 30 capsule 0    losartan (COZAAR) 25 MG tablet TAKE 1  TABLET BY MOUTH DAILY 90 tablet 3    meloxicam (MOBIC) 15 MG tablet TAKE 1 TABLET BY MOUTH  DAILY 100 tablet 3    montelukast (SINGULAIR) 10 MG tablet TAKE 1 TABLET BY MOUTH AT NIGHT 100 tablet 2    omeprazole (priLOSEC) 40 MG capsule Take 1 capsule by mouth Every Night. 100 capsule 3    phentermine (ADIPEX-P) 37.5 MG tablet Take 1 tablet by mouth Every Morning Before Breakfast. 90 tablet 1    traZODone (DESYREL) 50 MG tablet TAKE 1 TABLET BY MOUTH AT NIGHT 90 tablet 3    famotidine (PEPCID) 20 MG tablet Take 1 tablet by mouth twice daily as needed (Patient not taking: Reported on 1/23/2024) 90 tablet 0     No facility-administered medications prior to visit.       No opioid medication identified on active medication list. I have reviewed chart for other potential  high risk medication/s and harmful drug interactions in the elderly.        Aspirin is not on active medication list.  Aspirin use is not indicated based on review of current medical condition/s. Risk of harm outweighs potential benefits.  .    Patient Active Problem List   Diagnosis    Right tibial fracture    Ulcerative colitis    Low back pain    Irritable bowel syndrome    Allergic rhinitis    Sphincter of Oddi spasm    Anxiety    Depression    Asthma    Systemic lupus erythematosus arthritis    Fibromyalgia    Fatigue    Vestibular schwannoma    Hyperlipidemia    Balance problems    Generalized anxiety disorder    Hypertension    Insomnia    Sensorineural hearing loss    GERD (gastroesophageal reflux disease)    Onychomycosis    Vertigo    Class 3 severe obesity in adult    B12 deficiency    MARY CARMEN treated with BiPAP    Hyperkalemia    Weight loss    Fibromyositis    History of anxiety state    Benign essential hypertension    Seronegative arthritis    Lupus    Sensorineural hearing loss (SNHL) of left ear with unrestricted hearing of right ear    Osteoporosis    History of tobacco use    Class 1 obesity due to excess calories with serious comorbidity  "and body mass index (BMI) of 34.0 to 34.9 in adult    Adjustment disorder with depressed mood    Abdominal pain    Elevated lipase    High aspartate transaminase measurement    Family history of malignant neoplasm of breast in first degree relative    Medicare annual wellness visit, subsequent     Advance Care Planning   Advance Care Planning     Advance Directive is not on file.  ACP discussion was held with the patient during this visit. Patient does not have an advance directive, declines further assistance.     Objective    Vitals:    24 1043   BP: 112/78   BP Location: Right arm   Patient Position: Sitting   Cuff Size: Large Adult   Pulse: 95   Resp: 18   Temp: 96.9 °F (36.1 °C)   SpO2: 98%   Weight: 97.1 kg (214 lb)   Height: 166.4 cm (65.5\")   PainSc:   2   PainLoc: Face     Estimated body mass index is 35.07 kg/m² as calculated from the following:    Height as of this encounter: 166.4 cm (65.5\").    Weight as of this encounter: 97.1 kg (214 lb).       ATTENTION  What is the year: correct  What is the month of the year: correct  What is the day of the week?: correct  What is the date?: correct  MEMORY  Repeat address three times, only score third attempt: [address] [place], [place]: 4  HOW MANY ANIMALS DID THE PATIENT NAME  Verbal Fluency -- Animal Names (0-25): 22+  CLOCK DRAWING  Clock Drawing: All Correct  MEMORY RECALL  Tell me what you remember about that name and address we were repeating at the beginnin  ACE TOTAL SCORE  Total ACE Score - <25/30 strongly suggests cognitive impairment; <21/30 almost certainly shows dementia: 26      Does the patient have evidence of cognitive impairment? No    Lab Results   Component Value Date     (H) 01/10/2024        HEALTH RISK ASSESSMENT    Smoking Status:  Social History     Tobacco Use   Smoking Status Former    Packs/day: 0.50    Years: 8.00    Additional pack years: 0.00    Total pack years: 4.00    Types: Cigarettes    " Start date: 1987    Quit date: 1995    Years since quittin.0   Smokeless Tobacco Never     Alcohol Consumption:  Social History     Substance and Sexual Activity   Alcohol Use Yes    Alcohol/week: 1.0 standard drink of alcohol    Types: 1 Glasses of wine per week    Comment: Social     Fall Risk Screen:    HELLEN Fall Risk Assessment was completed, and patient is at HIGH risk for falls. Assessment completed on:2024    Depression Screenin/23/2024     2:44 PM   PHQ-2/PHQ-9 Depression Screening   Little Interest or Pleasure in Doing Things 0-->not at all   Feeling Down, Depressed or Hopeless 0-->not at all   PHQ-9: Brief Depression Severity Measure Score 0       Health Habits and Functional and Cognitive Screenin/29/2024    10:00 AM   Functional & Cognitive Status   Do you have difficulty preparing food and eating? No   Do you have difficulty bathing yourself, getting dressed or grooming yourself? No   Do you have difficulty using the toilet? No   Do you have difficulty moving around from place to place? No   Do you have trouble with steps or getting out of a bed or a chair? No   Current Diet Well Balanced Diet   Dental Exam Up to date   Eye Exam Up to date   Exercise (times per week) 0 times per week   Current Exercises Include No Regular Exercise        Exercise Comment on limited restrictions due to recent surgery   Do you need help using the phone?  No   Are you deaf or do you have serious difficulty hearing?  Yes   Do you need help to go to places out of walking distance? No   Do you need help shopping? No   Do you need help preparing meals?  No   Do you need help with housework?  No   Do you need help with laundry? No   Do you need help taking your medications? No   Do you need help managing money? No   Do you ever drive or ride in a car without wearing a seat belt? No   Have you felt unusual stress, anger or loneliness in the last month? No   Who do you live with? Spouse   If  you need help, do you have trouble finding someone available to you? No   Do you have difficulty concentrating, remembering or making decisions? No       Age-appropriate Screening Schedule:  Refer to the list below for future screening recommendations based on patient's age, sex and/or medical conditions. Orders for these recommended tests are listed in the plan section. The patient has been provided with a written plan.    Health Maintenance   Topic Date Due    COVID-19 Vaccine (4 - -24 season) 2023    ZOSTER VACCINE (2 of 2) 2023    LIPID PANEL  2024    ANNUAL WELLNESS VISIT  2025    BMI FOLLOWUP  2025    MAMMOGRAM  2025    DXA SCAN  2025    PAP SMEAR  2026    COLORECTAL CANCER SCREENING  2032    TDAP/TD VACCINES (2 - Td or Tdap) 2033    HEPATITIS C SCREENING  Completed    Pneumococcal Vaccine 0-64  Completed    INFLUENZA VACCINE  Completed              ATTENTION  What is the year: correct  What is the month of the year: correct  What is the day of the week?: correct  What is the date?: correct  MEMORY  Repeat address three times, only score third attempt: [address] [place] [address]: 4  HOW MANY ANIMALS DID THE PATIENT NAME  Verbal Fluency -- Animal Names (0-25): 22+  CLOCK DRAWING  Clock Drawing: All Correct  MEMORY RECALL  Tell me what you remember about that name and address we were repeating at the beginnin  ACE TOTAL SCORE  Total ACE Score - <25/30 strongly suggests cognitive impairment; <21/30 almost certainly shows dementia: 26      CMS Preventative Services Quick Reference  Risk Factors Identified During Encounter  Depression/Dysphoria: Current medication is effective, no change recommended  Immunizations Discussed/Encouraged: Shingrix and COVID19  The above risks/problems have been discussed with the patient.  Pertinent information has been shared with the patient in the After Visit Summary.  An After Visit Summary  and PPPS were made available to the patient.    Follow Up:   Next Medicare Wellness visit to be scheduled in 1 year.       Additional E&M Note during same encounter follows:  Patient has multiple medical problems which are significant and separately identifiable that require additional work above and beyond the Medicare Wellness Visit.      Chief Complaint  Medicare Wellness-subsequent and Weight Check    Subjective        HPI  Amy L Sturgeon is also being seen today for weight management follow up    Weight Management  Patient is currently taking adepax .  Patient reports they are taking medications as prescribed and they are not having side effects.  Side effects include none  Current diet is Well Balanced and Variety of foods  Patient has lost  0 in 2 weeks  Patient is exercising.       After staring folic acid, vit B12 and vit C prior to surgery.  starting on November 16, completing after surgery January 9th.    not like previous ulcers that would occur inside the lip.  This has been a burning swelling purple discoloration and blistering of the bottom lip only  Patient states the source cleared up when on amoxicillin but since completed 6 days ago, progresivly worse again.  Patient was also taking a prolonged course of steroids completing at the same time and stopping the amoxicillin.  Vaseline has been the most helpful,  lidocaine can help but very momentarily, gargling with peroxide can help, but now that mucosa is irritated the peroxide burns.    Hospital admission 1/9 to 1/12/2024 for left acoustic neuroma removal via translabyrinthectomy approach on 1/9/2020  with Dr. Amin and Dr. Alcaraz.  Patient was on steroids for 1 month in the perioperative.  And was not taking phentermine.  She has since been told she can restart, stared about 4 days ago. Does  cause dry dry mouth and apititie suppression does want refill at [facility].  Other medications need to be sent to [facility] mail order.       Objective  "  Vital Signs:  /78 (BP Location: Right arm, Patient Position: Sitting, Cuff Size: Large Adult)   Pulse 95   Temp 96.9 °F (36.1 °C)   Resp 18   Ht 166.4 cm (65.5\")   Wt 97.1 kg (214 lb)   SpO2 98%   BMI 35.07 kg/m²     Physical Exam  Vitals and nursing note reviewed.   Constitutional:       General: She is not in acute distress.     Appearance: Normal appearance. She is well-developed. She is obese.   HENT:      Head: Normocephalic and atraumatic.      Right Ear: External ear normal.      Left Ear: External ear normal.      Mouth/Throat:      Comments: There is mild swelling and cobblestoning on the inside of the lower lip.  Mild fissure at the corners of the mouth  Eyes:      Conjunctiva/sclera: Conjunctivae normal.      Comments: Pupils are equal and round   Pulmonary:      Effort: Pulmonary effort is normal.   Abdominal:      General: Bowel sounds are normal.   Skin:     General: Skin is dry.      Comments: 11 cm curvilinear surgical wound around the posterior aspect of the right ear.  Superior anterior aspect with small crust, no erythema, no drainage.  mild fading bruising   Neurological:      Mental Status: She is alert and oriented to person, place, and time.      Comments: Minimal ataxia and unsteadiness to the right but able to catch herself easily without getting dangerously off balance   Psychiatric:         Mood and Affect: Mood normal.         Behavior: Behavior normal.          The following data was reviewed by: Rosa Elena Turcios MD on 01/29/2024:  Common labs          12/29/2023    13:17 1/9/2024    19:24 1/10/2024    01:48   Common Labs   Glucose 91     151     134       BUN 14     15     16       Creatinine 0.77     0.83     0.83       Sodium 140     137     138       Potassium 4.3     5.6     4.2       Chloride 107     104     104       Calcium 8.9     8.3     9.5          Details          This result is from an external source.                        Assessment and Plan "   Diagnoses and all orders for this visit:    1. Encounter for subsequent annual wellness visit in Medicare patient (Primary)    2. Advanced care planning/counseling discussion    3. Aphthous ulcer of mouth  -     dexAMETHasone 0.5 MG/5ML solution; Hold 5 ml of solution in lower lip for up to 5 minutes then spit solution, 3 times daily  Dispense: 240 mL; Refill: 0  -     Ambulatory Referral to Gastroenterology    4. Gastroesophageal reflux disease without esophagitis  -     famotidine (PEPCID) 40 MG tablet; Take 1 tablet by mouth Every Night.  Dispense: 100 tablet; Refill: 3    5. Ulcerative colitis with complication, unspecified location  -     dexAMETHasone 0.5 MG/5ML solution; Hold 5 ml of solution in lower lip for up to 5 minutes then spit solution, 3 times daily  Dispense: 240 mL; Refill: 0  -     Ambulatory Referral to Gastroenterology    6. Morbid (severe) obesity due to excess calories  -     phentermine (ADIPEX-P) 37.5 MG tablet; Take 1 tablet by mouth Every Morning Before Breakfast.  Dispense: 90 tablet; Refill: 0    7. Recurrent major depressive disorder, in partial remission  -     DULoxetine (CYMBALTA) 60 MG capsule; Take 1 capsule by mouth Daily.  Dispense: 100 capsule; Refill: 3    8. Anxiety  -     hydrOXYzine pamoate (VISTARIL) 25 MG capsule; Take 1 capsule by mouth 3 (Three) Times a Day As Needed for Anxiety.  Dispense: 30 capsule; Refill: 0    9. Essential hypertension  -     losartan (COZAAR) 25 MG tablet; Take 1 tablet by mouth Daily.  Dispense: 100 tablet; Refill: 3    10. Myalgia  -     meloxicam (MOBIC) 15 MG tablet; 1/2 to 1 daily as needed for pain  Dispense: 100 tablet; Refill: 3    11. Lumbar back pain with radiculopathy affecting right lower extremity  -     meloxicam (MOBIC) 15 MG tablet; 1/2 to 1 daily as needed for pain  Dispense: 100 tablet; Refill: 3  -     POCT urinalysis dipstick, manual    12. Moderate persistent asthma, unspecified whether complicated  -     montelukast  (SINGULAIR) 10 MG tablet; Take 1 tablet by mouth every night at bedtime.  Dispense: 100 tablet; Refill: 3    13. Primary insomnia  -     traZODone (DESYREL) 50 MG tablet; Take 1 tablet by mouth every night at bedtime.  Dispense: 100 tablet; Refill: 3    14. Medicare annual wellness visit, subsequent  -     POCT urinalysis dipstick, manual    15. History of IBS  -     dicyclomine (BENTYL) 10 MG capsule; Take 1 capsule by mouth 4 (Four) Times a Day Before Meals & at Bedtime.  Dispense: 120 capsule; Refill: 0  -     Ambulatory Referral to Gastroenterology    16. Seasonal allergies  -     cetirizine (zyrTEC) 10 MG tablet; Take 1 tablet by mouth Daily.  Dispense: 100 tablet; Refill: 3      The patient was counseled regarding nutrition, physical activity, healthy weight, injury prevention, immunizations and preventative health screenings.    Spent 15 minutes with patient discussing/counseling regarding advance care directive and living will.  Patient did complete POST form and [place] living will declaration.  Forms to be scanned to chart.    Patient is exhibiting oral ulcers, inflammation and pain on her lower lip.  She has had recurrent aphthous ulcers in the past.  She has diagnosis of ulcerative colitis but is not currently on treatment.  Suspect this is an oral manifestation of inflammatory bowel disease.  Going to try topical steroids in addition to continuing lidocaine for pain control and refer to GI for further evaluation.  She is also wanting to change from PPI to H2 blocker and wanting to restart Bentyl for abdominal discomfort.    Patient's (Body mass index is 35.07 kg/m².) indicates that they are morbidly obese (BMI > 40 or > 35 with obesity - related health condition) with health related conditions that include hypertension and GERD . Weight is worsening. BMI is is above average; BMI management plan is completed. We discussed low calorie, low carb based diet program, portion control, increasing exercise, and  "pharmacologic options including phentermine .     Patient is needing new prescriptions for nearly all medications.  \"Conditions as above reviewed and stable on current treatment renewing as requested.         Follow Up   Return in about 4 weeks (around 2/26/2024) for Recheck weight, oral ulcers etc..  Patient was given instructions and counseling regarding her condition or for health maintenance advice. Please see specific information pulled into the AVS if appropriate.           "

## 2024-01-23 ENCOUNTER — OFFICE VISIT (OUTPATIENT)
Dept: FAMILY MEDICINE CLINIC | Facility: CLINIC | Age: 54
End: 2024-01-23
Payer: MEDICARE

## 2024-01-23 VITALS
SYSTOLIC BLOOD PRESSURE: 108 MMHG | WEIGHT: 208 LBS | OXYGEN SATURATION: 99 % | TEMPERATURE: 96.4 F | HEART RATE: 107 BPM | DIASTOLIC BLOOD PRESSURE: 62 MMHG | BODY MASS INDEX: 33.43 KG/M2 | HEIGHT: 66 IN | RESPIRATION RATE: 18 BRPM

## 2024-01-23 DIAGNOSIS — Z86.018 S/P EXCISION OF ACOUSTIC NEUROMA: Primary | ICD-10-CM

## 2024-01-23 DIAGNOSIS — Z48.02 VISIT FOR SUTURE REMOVAL: ICD-10-CM

## 2024-01-23 DIAGNOSIS — Z98.890 S/P EXCISION OF ACOUSTIC NEUROMA: Primary | ICD-10-CM

## 2024-01-23 DIAGNOSIS — Z87.891 HISTORY OF TOBACCO USE: ICD-10-CM

## 2024-01-23 DIAGNOSIS — E66.09 CLASS 1 OBESITY DUE TO EXCESS CALORIES WITH SERIOUS COMORBIDITY AND BODY MASS INDEX (BMI) OF 34.0 TO 34.9 IN ADULT: ICD-10-CM

## 2024-01-23 RX ORDER — AMOXICILLIN AND CLAVULANATE POTASSIUM 875; 125 MG/1; MG/1
1 TABLET, FILM COATED ORAL 2 TIMES DAILY
COMMUNITY
Start: 2024-01-12 | End: 2024-01-24

## 2024-01-29 ENCOUNTER — OFFICE VISIT (OUTPATIENT)
Dept: FAMILY MEDICINE CLINIC | Facility: CLINIC | Age: 54
End: 2024-01-29
Payer: MEDICARE

## 2024-01-29 VITALS
WEIGHT: 214 LBS | TEMPERATURE: 96.9 F | OXYGEN SATURATION: 98 % | DIASTOLIC BLOOD PRESSURE: 78 MMHG | HEART RATE: 95 BPM | RESPIRATION RATE: 18 BRPM | SYSTOLIC BLOOD PRESSURE: 112 MMHG | BODY MASS INDEX: 34.39 KG/M2 | HEIGHT: 66 IN

## 2024-01-29 DIAGNOSIS — M79.10 MYALGIA: ICD-10-CM

## 2024-01-29 DIAGNOSIS — I10 ESSENTIAL HYPERTENSION: ICD-10-CM

## 2024-01-29 DIAGNOSIS — Z87.19 HISTORY OF IBS: ICD-10-CM

## 2024-01-29 DIAGNOSIS — J30.2 SEASONAL ALLERGIES: ICD-10-CM

## 2024-01-29 DIAGNOSIS — Z00.00 ENCOUNTER FOR SUBSEQUENT ANNUAL WELLNESS VISIT IN MEDICARE PATIENT: Primary | ICD-10-CM

## 2024-01-29 DIAGNOSIS — J45.40 MODERATE PERSISTENT ASTHMA, UNSPECIFIED WHETHER COMPLICATED: ICD-10-CM

## 2024-01-29 DIAGNOSIS — F33.41 RECURRENT MAJOR DEPRESSIVE DISORDER, IN PARTIAL REMISSION: ICD-10-CM

## 2024-01-29 DIAGNOSIS — K51.919 ULCERATIVE COLITIS WITH COMPLICATION, UNSPECIFIED LOCATION: ICD-10-CM

## 2024-01-29 DIAGNOSIS — F51.01 PRIMARY INSOMNIA: ICD-10-CM

## 2024-01-29 DIAGNOSIS — F41.9 ANXIETY: ICD-10-CM

## 2024-01-29 DIAGNOSIS — K21.9 GASTROESOPHAGEAL REFLUX DISEASE WITHOUT ESOPHAGITIS: ICD-10-CM

## 2024-01-29 DIAGNOSIS — M54.16 LUMBAR BACK PAIN WITH RADICULOPATHY AFFECTING RIGHT LOWER EXTREMITY: ICD-10-CM

## 2024-01-29 DIAGNOSIS — Z00.00 MEDICARE ANNUAL WELLNESS VISIT, SUBSEQUENT: ICD-10-CM

## 2024-01-29 DIAGNOSIS — E66.01 MORBID (SEVERE) OBESITY DUE TO EXCESS CALORIES: ICD-10-CM

## 2024-01-29 DIAGNOSIS — Z71.89 ADVANCED CARE PLANNING/COUNSELING DISCUSSION: ICD-10-CM

## 2024-01-29 DIAGNOSIS — K12.0 APHTHOUS ULCER OF MOUTH: ICD-10-CM

## 2024-01-29 LAB
BILIRUB BLD-MCNC: NEGATIVE MG/DL
CLARITY, POC: CLEAR
COLOR UR: YELLOW
GLUCOSE UR STRIP-MCNC: NEGATIVE MG/DL
KETONES UR QL: NEGATIVE
LEUKOCYTE EST, POC: NEGATIVE
NITRITE UR-MCNC: NEGATIVE MG/ML
PH UR: 6.5 [PH] (ref 5–8)
PROT UR STRIP-MCNC: NEGATIVE MG/DL
RBC # UR STRIP: NEGATIVE /UL
SP GR UR: 1.02 (ref 1–1.03)
UROBILINOGEN UR QL: NORMAL

## 2024-01-29 RX ORDER — MONTELUKAST SODIUM 10 MG/1
10 TABLET ORAL
Qty: 100 TABLET | Refills: 3 | Status: SHIPPED | OUTPATIENT
Start: 2024-01-29

## 2024-01-29 RX ORDER — DULOXETIN HYDROCHLORIDE 60 MG/1
60 CAPSULE, DELAYED RELEASE ORAL DAILY
Qty: 100 CAPSULE | Refills: 3 | Status: SHIPPED | OUTPATIENT
Start: 2024-01-29

## 2024-01-29 RX ORDER — PHENTERMINE HYDROCHLORIDE 37.5 MG/1
37.5 TABLET ORAL
Qty: 90 TABLET | Refills: 0 | Status: SHIPPED | OUTPATIENT
Start: 2024-01-29

## 2024-01-29 RX ORDER — MELOXICAM 15 MG/1
TABLET ORAL
Qty: 100 TABLET | Refills: 3 | Status: SHIPPED | OUTPATIENT
Start: 2024-01-29

## 2024-01-29 RX ORDER — DICYCLOMINE HYDROCHLORIDE 10 MG/1
10 CAPSULE ORAL
Qty: 120 CAPSULE | Refills: 0 | Status: SHIPPED | OUTPATIENT
Start: 2024-01-29

## 2024-01-29 RX ORDER — HYDROXYZINE PAMOATE 25 MG/1
25 CAPSULE ORAL 3 TIMES DAILY PRN
Qty: 30 CAPSULE | Refills: 0 | Status: SHIPPED | OUTPATIENT
Start: 2024-01-29

## 2024-01-29 RX ORDER — LOSARTAN POTASSIUM 25 MG/1
25 TABLET ORAL DAILY
Qty: 100 TABLET | Refills: 3 | Status: SHIPPED | OUTPATIENT
Start: 2024-01-29

## 2024-01-29 RX ORDER — TRAZODONE HYDROCHLORIDE 50 MG/1
50 TABLET ORAL
Qty: 100 TABLET | Refills: 3 | Status: SHIPPED | OUTPATIENT
Start: 2024-01-29

## 2024-01-29 RX ORDER — FAMOTIDINE 40 MG/1
40 TABLET, FILM COATED ORAL NIGHTLY
Qty: 100 TABLET | Refills: 3 | Status: SHIPPED | OUTPATIENT
Start: 2024-01-29

## 2024-01-29 RX ORDER — DEXAMETHASONE 0.5 MG/5ML
SOLUTION ORAL
Qty: 240 ML | Refills: 0 | Status: SHIPPED | OUTPATIENT
Start: 2024-01-29

## 2024-01-29 RX ORDER — CETIRIZINE HYDROCHLORIDE 10 MG/1
10 TABLET ORAL DAILY
Qty: 100 TABLET | Refills: 3 | Status: SHIPPED | OUTPATIENT
Start: 2024-01-29

## 2024-05-21 NOTE — PROGRESS NOTES
Chief Complaint  Low libido and Back Pain    History of Present Illness  Amy L Sturgeon presents today for new onset concern of decreased libido and back pain      Pt states she is actually here for weight gain which is causing a flair in back pain. Phentermine stopped working for apptitie suppression and not using it. 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      Patient reports they have had low libido for several years since her total hysterectomy at 33 years old. Onset was gradual and it is worsening. She was prescribed a vaginal cream about one year ago, she has discontinued that. Patient does not see gynecology.     Back Pain: Patient complains of lumbar spine pain which is described as aching.  Patient denies numbness/tingling which does not radiate to legs.   She reports that the pain has been present for 2 days. Pain was first noted after no known injury.  Symptoms are relieved by NSAIDs.  The back problem is not work related.  She denies fever, bladder or bowel incontinence, or weakness to the hips or legs.       Patient Care Team:  Rosa Elena Turcios MD as PCP - General (Family Medicine)  Mariluz Avila as Technologist   Current Outpatient Medications on File Prior to Visit   Medication Sig    albuterol sulfate  (90 Base) MCG/ACT inhaler INHALE 2 PUFFS BY MOUTH  EVERY 4 HOURS AS NEEDED FOR WHEEZING    cetirizine (zyrTEC) 10 MG tablet Take 1 tablet by mouth Daily.    [facility] Vitamin B-12 1000 MCG tablet Take 1 tablet by mouth Daily.    dexAMETHasone 0.5 MG/5ML solution Hold 5 ml of solution in lower lip for up to 5 minutes then spit solution, 3 times daily    DULoxetine (CYMBALTA) 60 MG capsule Take 1 capsule by mouth Daily.    famotidine (PEPCID) 40 MG tablet Take 1 tablet by mouth Every Night.    gabapentin (NEURONTIN) 800 MG tablet  "TAKE 1 TABLET BY MOUTH 3 TIMES  DAILY MAY TAKE UP TO 4 TIMES  DAILY IF NEEDED (Patient taking differently: Take 1 tablet by mouth 2 (Two) Times a Day.)    hydrOXYzine pamoate (VISTARIL) 25 MG capsule Take 1 capsule by mouth 3 (Three) Times a Day As Needed for Anxiety.    Lidocaine Viscous HCl (XYLOCAINE) 2 % solution Dilute 5 to 10 ml in water, salt water ,or mouth wash and swish in mouth and spit 3-4 times daily. May apply small amount directly to ulcer prn    losartan (COZAAR) 25 MG tablet Take 1 tablet by mouth Daily.    meloxicam (MOBIC) 15 MG tablet 1/2 to 1 daily as needed for pain (Patient taking differently: Take 0.5 tablets by mouth Daily. 1/2 to 1 daily as needed for pain)    montelukast (SINGULAIR) 10 MG tablet Take 1 tablet by mouth every night at bedtime.    traZODone (DESYREL) 50 MG tablet Take 1 tablet by mouth every night at bedtime.    chlorhexidine (PERIDEX) 0.12 % solution Apply 15 mL to the mouth or throat 2 (Two) Times a Day. (Patient not taking: Reported on 5/22/2024)    dicyclomine (BENTYL) 10 MG capsule Take 1 capsule by mouth 4 (Four) Times a Day Before Meals & at Bedtime. (Patient not taking: Reported on 5/22/2024)     No current facility-administered medications on file prior to visit.       Objective   Vital Signs:   /70 (BP Location: Right arm, Patient Position: Sitting, Cuff Size: Large Adult)   Pulse 80   Temp 97.8 °F (36.6 °C) (Temporal)   Resp 18   Ht 166.4 cm (65.5\")   Wt 98.9 kg (218 lb)   SpO2 95%   BMI 35.73 kg/m²    BP Readings from Last 3 Encounters:   05/22/24 116/70   01/29/24 112/78   01/23/24 108/62     Wt Readings from Last 3 Encounters:   05/22/24 98.9 kg (218 lb)   01/29/24 97.1 kg (214 lb)   01/23/24 94.3 kg (208 lb)         Physical Exam       Office Visit on 05/22/2024   Component Date Value Ref Range Status    Hemoglobin A1C 05/22/2024 5.6  4.5 - 5.7 % Final    Lot Number 05/22/2024 [id number]   Final    Expiration Date 05/22/2024 03/31/2026   Final     A1C " Last 3 Results          9/12/2023    13:37 5/22/2024    16:04   HGBA1C Last 3 Results   Hemoglobin A1C 5.4  5.6      Lab Results   Component Value Date    CHLPL 186 09/12/2023    TRIG 119 09/12/2023    HDL 58 09/12/2023     (H) 09/12/2023     Lab Results   Component Value Date    TSH 1.320 09/12/2023     Lab Results   Component Value Date    GLUCOSE 89 09/12/2023    BUN 16 01/10/2024    CREATININE 0.83 01/10/2024    EGFRIFNONA 80 12/18/2021    EGFRIFAFRI 92 12/18/2021    BCR 19 09/12/2023    K 4.2 01/10/2024    CO2 25 01/10/2024    CALCIUM 9.5 01/10/2024    PROTENTOTREF 6.7 09/12/2023    ALBUMIN 4.3 09/12/2023    LABIL2 1.8 09/12/2023    AST 19 09/12/2023    ALT 16 09/12/2023     Lab Results   Component Value Date    WBC 7.2 09/12/2023    HGB 14.5 09/12/2023    HCT 44.5 09/12/2023    MCV 88 09/12/2023     09/12/2023                      Assessment and Plan    Diagnoses and all orders for this visit:    1. Low libido (Primary)    2. Lumbar back pain    3. Class 2 obesity with body mass index (BMI) of 35.0 to 35.9 in adult, unspecified obesity type, unspecified whether serious comorbidity present    4. History of tobacco use    5. Morbid (severe) obesity due to excess calories  -     phentermine (ADIPEX-P) 37.5 MG tablet; 1/2 tablet before breakfast for 1 week, then may increase to one half tablet before breakfast and lunch (or 1 tablet prior to breakfast) as needed for appetite suppression.  Dispense: 30 tablet; Refill: 1  -     Ambulatory Referral to Nutrition Services    6. Elevated hemoglobin A1c  -     POC Glycosylated Hemoglobin (Hb A1C)    7. Encounter for screening mammogram for breast cancer  -     Mammo Screening Digital Tomosynthesis Bilateral With CAD; Future        Discussed multiple weight loss aids including using apps such as lose it and noom.  Have done weight watchers and aps in past with some benifit.  Cannot afford Meal replacement plans such as HMR or Nutrisystem.  Advised do need to  find a way to increase physical activity to a minimum of 150 minutes of aerobic exercise weekly.  Did discuss weight loss medications include semaglutide, which is oral Rybelsus or injectable Wegovy or Ozempic, Mack or Xenical (orlistat), Contrave, Plenity, Saxenda, or Qsymia.  Advised that she has not been diagnosed with diabetes therefore Ozempic or Mounjaro is not appropriate.  Insurance does not cover Wegovy or zepbound.  Placing referral to dietitian at bariatric clinic.    Patient sent message after business hours requesting Wegovy prescription be sent to [facility] pharmacy anticipating denial and then possible consideration for applying for an appeal.  This was ultimately denied.  Prescription was sent and then resent to [facility] in [place].  Phentermine was sent in the interim.  She then requested compounded semaglutide prescription, advise she needed to return to discuss and if appropriate sign consent form for compounded semaglutide    Due for mammogram, placing order.    Medications Discontinued During This Encounter   Medication Reason    phentermine (ADIPEX-P) 37.5 MG tablet Reorder         Follow Up     Return 1-2  months for weight check.    Patient was given instructions and counseling regarding her condition or for health maintenance advice. Please see specific information pulled into the AVS if appropriate.

## 2024-05-22 ENCOUNTER — OFFICE VISIT (OUTPATIENT)
Dept: FAMILY MEDICINE CLINIC | Facility: CLINIC | Age: 54
End: 2024-05-22
Payer: MEDICARE

## 2024-05-22 VITALS
TEMPERATURE: 97.8 F | RESPIRATION RATE: 18 BRPM | WEIGHT: 218 LBS | HEIGHT: 66 IN | DIASTOLIC BLOOD PRESSURE: 70 MMHG | HEART RATE: 80 BPM | SYSTOLIC BLOOD PRESSURE: 116 MMHG | BODY MASS INDEX: 35.03 KG/M2 | OXYGEN SATURATION: 95 %

## 2024-05-22 DIAGNOSIS — M54.50 LUMBAR BACK PAIN: ICD-10-CM

## 2024-05-22 DIAGNOSIS — Z87.891 HISTORY OF TOBACCO USE: ICD-10-CM

## 2024-05-22 DIAGNOSIS — Z12.31 ENCOUNTER FOR SCREENING MAMMOGRAM FOR BREAST CANCER: ICD-10-CM

## 2024-05-22 DIAGNOSIS — E66.01 MORBID (SEVERE) OBESITY DUE TO EXCESS CALORIES: ICD-10-CM

## 2024-05-22 DIAGNOSIS — E66.9 CLASS 2 OBESITY WITH BODY MASS INDEX (BMI) OF 35.0 TO 35.9 IN ADULT, UNSPECIFIED OBESITY TYPE, UNSPECIFIED WHETHER SERIOUS COMORBIDITY PRESENT: ICD-10-CM

## 2024-05-22 DIAGNOSIS — R73.09 ELEVATED HEMOGLOBIN A1C: ICD-10-CM

## 2024-05-22 DIAGNOSIS — R68.82 LOW LIBIDO: Primary | ICD-10-CM

## 2024-05-22 LAB
EXPIRATION DATE: NORMAL
HBA1C MFR BLD: 5.6 % (ref 4.5–5.7)
Lab: NORMAL

## 2024-05-22 PROCEDURE — 1160F RVW MEDS BY RX/DR IN RCRD: CPT | Performed by: FAMILY MEDICINE

## 2024-05-22 PROCEDURE — 99214 OFFICE O/P EST MOD 30 MIN: CPT | Performed by: FAMILY MEDICINE

## 2024-05-22 PROCEDURE — 3074F SYST BP LT 130 MM HG: CPT | Performed by: FAMILY MEDICINE

## 2024-05-22 PROCEDURE — 3078F DIAST BP <80 MM HG: CPT | Performed by: FAMILY MEDICINE

## 2024-05-22 PROCEDURE — 1159F MED LIST DOCD IN RCRD: CPT | Performed by: FAMILY MEDICINE

## 2024-05-22 PROCEDURE — 83036 HEMOGLOBIN GLYCOSYLATED A1C: CPT | Performed by: FAMILY MEDICINE

## 2024-05-22 PROCEDURE — 1125F AMNT PAIN NOTED PAIN PRSNT: CPT | Performed by: FAMILY MEDICINE

## 2024-05-22 PROCEDURE — 3044F HG A1C LEVEL LT 7.0%: CPT | Performed by: FAMILY MEDICINE

## 2024-05-22 RX ORDER — OMEGA-3/DHA/EPA/FISH OIL 35-113.5MG
1 TABLET,CHEWABLE ORAL DAILY
COMMUNITY
Start: 2024-02-22

## 2024-05-22 RX ORDER — PHENTERMINE HYDROCHLORIDE 37.5 MG/1
TABLET ORAL
Qty: 30 TABLET | Refills: 1 | Status: SHIPPED | OUTPATIENT
Start: 2024-05-22

## 2024-05-24 ENCOUNTER — PATIENT MESSAGE (OUTPATIENT)
Dept: FAMILY MEDICINE CLINIC | Facility: CLINIC | Age: 54
End: 2024-05-24
Payer: MEDICARE

## 2024-05-24 DIAGNOSIS — E66.9 OBESITY (BMI 30-39.9): ICD-10-CM

## 2024-05-24 DIAGNOSIS — E66.9 OBESITY (BMI 35.0-39.9 WITHOUT COMORBIDITY): ICD-10-CM

## 2024-05-24 DIAGNOSIS — E66.01 MORBID (SEVERE) OBESITY DUE TO EXCESS CALORIES: Primary | ICD-10-CM

## 2024-05-24 DIAGNOSIS — I10 PRIMARY HYPERTENSION: ICD-10-CM

## 2024-05-24 DIAGNOSIS — K21.9 GASTROESOPHAGEAL REFLUX DISEASE WITHOUT ESOPHAGITIS: ICD-10-CM

## 2024-05-24 DIAGNOSIS — E78.2 MIXED HYPERLIPIDEMIA: ICD-10-CM

## 2024-05-27 RX ORDER — SEMAGLUTIDE 0.25 MG/.5ML
0.25 INJECTION, SOLUTION SUBCUTANEOUS WEEKLY
Qty: 2 ML | Refills: 0 | Status: SHIPPED | OUTPATIENT
Start: 2024-05-27

## 2024-05-27 NOTE — TELEPHONE ENCOUNTER
From: Amy L Sturgeon  To: Rosa Elena Turcois  Sent: 5/24/2024 12:20 PM EDT  Subject: Wegovy    Please send prescription to [facility] in [place]. [facility] doesn’t have it.

## 2024-05-29 ENCOUNTER — TELEPHONE (OUTPATIENT)
Dept: FAMILY MEDICINE CLINIC | Facility: CLINIC | Age: 54
End: 2024-05-29
Payer: MEDICARE

## 2024-05-29 NOTE — TELEPHONE ENCOUNTER
"Submitted PA for Wegovy. Informed patient via [email or web address].   Per covermymeds, \"Turnaround time for review of a PA request is dependent upon insurance plan and can range from 24 hours to 5 calendar days.\"  "

## 2024-05-29 NOTE — TELEPHONE ENCOUNTER
Please inform patient she will need to schedule an appointment to follow the proper protocol for prescribing the compounded version of the medication including signing the consent form for off label use of a compounded medication.  She will also need to commit to coming in monthly to every 3 months for the first year of treatment.

## 2024-06-20 ENCOUNTER — TELEPHONE (OUTPATIENT)
Dept: FAMILY MEDICINE CLINIC | Facility: CLINIC | Age: 54
End: 2024-06-20
Payer: MEDICARE

## 2024-06-20 DIAGNOSIS — E55.9 VITAMIN D DEFICIENCY: ICD-10-CM

## 2024-06-20 DIAGNOSIS — I10 PRIMARY HYPERTENSION: ICD-10-CM

## 2024-06-20 DIAGNOSIS — E78.2 MIXED HYPERLIPIDEMIA: Primary | ICD-10-CM

## 2024-06-20 DIAGNOSIS — E66.9 OBESITY (BMI 35.0-39.9 WITHOUT COMORBIDITY): ICD-10-CM

## 2024-06-20 NOTE — TELEPHONE ENCOUNTER
"Per Dr. Turcios regarding labs prior to upcoming appt,  \"If patient is wanting labs please cancel and reorder the lipid panel, CMP, vitamin D level and add a CBC. Otherwise can order along with an A1c as well to be done in mid to late August or September.\"    Unable to cancel labs from December. Will enter new orders now.  "

## 2024-07-22 ENCOUNTER — OFFICE VISIT (OUTPATIENT)
Dept: FAMILY MEDICINE CLINIC | Facility: CLINIC | Age: 54
End: 2024-07-22
Payer: MEDICARE

## 2024-07-22 ENCOUNTER — TELEPHONE (OUTPATIENT)
Dept: FAMILY MEDICINE CLINIC | Facility: CLINIC | Age: 54
End: 2024-07-22

## 2024-07-22 VITALS
HEART RATE: 72 BPM | BODY MASS INDEX: 34.07 KG/M2 | TEMPERATURE: 97.3 F | SYSTOLIC BLOOD PRESSURE: 120 MMHG | RESPIRATION RATE: 18 BRPM | WEIGHT: 212 LBS | HEIGHT: 66 IN | OXYGEN SATURATION: 97 % | DIASTOLIC BLOOD PRESSURE: 68 MMHG

## 2024-07-22 DIAGNOSIS — I10 PRIMARY HYPERTENSION: ICD-10-CM

## 2024-07-22 DIAGNOSIS — E66.01 MORBID (SEVERE) OBESITY DUE TO EXCESS CALORIES: ICD-10-CM

## 2024-07-22 DIAGNOSIS — Z87.891 HISTORY OF TOBACCO USE: ICD-10-CM

## 2024-07-22 DIAGNOSIS — Z78.0 MENOPAUSE: ICD-10-CM

## 2024-07-22 DIAGNOSIS — J30.1 NON-SEASONAL ALLERGIC RHINITIS DUE TO POLLEN: ICD-10-CM

## 2024-07-22 DIAGNOSIS — E78.2 MIXED HYPERLIPIDEMIA: ICD-10-CM

## 2024-07-22 DIAGNOSIS — E55.9 VITAMIN D DEFICIENCY: ICD-10-CM

## 2024-07-22 DIAGNOSIS — E66.9 OBESITY (BMI 30-39.9): Primary | ICD-10-CM

## 2024-07-22 PROCEDURE — 3074F SYST BP LT 130 MM HG: CPT | Performed by: FAMILY MEDICINE

## 2024-07-22 PROCEDURE — 1126F AMNT PAIN NOTED NONE PRSNT: CPT | Performed by: FAMILY MEDICINE

## 2024-07-22 PROCEDURE — G2211 COMPLEX E/M VISIT ADD ON: HCPCS | Performed by: FAMILY MEDICINE

## 2024-07-22 PROCEDURE — 99214 OFFICE O/P EST MOD 30 MIN: CPT | Performed by: FAMILY MEDICINE

## 2024-07-22 PROCEDURE — 3078F DIAST BP <80 MM HG: CPT | Performed by: FAMILY MEDICINE

## 2024-07-22 RX ORDER — PHENTERMINE HYDROCHLORIDE 37.5 MG/1
37.5 TABLET ORAL
Qty: 30 TABLET | Refills: 2 | Status: SHIPPED | OUTPATIENT
Start: 2024-07-22

## 2024-07-22 RX ORDER — FLUTICASONE PROPIONATE 50 MCG
2 SPRAY, SUSPENSION (ML) NASAL DAILY
Qty: 48 G | Refills: 3 | Status: SHIPPED | OUTPATIENT
Start: 2024-07-22

## 2024-07-22 RX ORDER — CALCIUM CARBONATE/VITAMIN D3 500-15 MCG
2 TABLET ORAL DAILY
Qty: 180 TABLET | Refills: 3 | Status: SHIPPED | OUTPATIENT
Start: 2024-07-22

## 2024-07-22 NOTE — PROGRESS NOTES
Chief Complaint  Obesity, Hyperlipidemia, Hypertension, and Vitamin D Deficiency    History of Present Illness  Amy L Sturgeon presents today for follow up on weight management, hyperlipidemia, hypertension, and vitamin D deficiency    Weight Management  Patient is currently taking Phentermine.  Patient reports they are taking medications as prescribed and they are not having side effects.  Current diet is Variety of foods  Patient has lost  6 lbs in 2 months  Patient is not exercising, other than yard work, or working on diet very well.     Hyperlipidemia  Patient is following a low cholesterol diet.   Currently is not on statin therapy.  Patient reports is not exercising.    Hypertension  Patient does not check blood pressure at home.   Patient denies  blurred vision, chest pain, dyspnea, headache, neck aches, orthopnea, palpitations, paroxysmal nocturnal dyspnea, peripheral edema, pulsating in the ears, and tiredness/fatigue   Patient reports they are taking medications as prescribed and they are not having side effects.    Vitamin D Deficiency   Symptoms include  none  Patient reports is not taking vitamin d supplement.    Patient Care Team:  Rosa Elena Turcios MD as PCP - General (Family Medicine)  Mariluz vAila as Technologist   Current Outpatient Medications on File Prior to Visit   Medication Sig    albuterol sulfate  (90 Base) MCG/ACT inhaler INHALE 2 PUFFS BY MOUTH  EVERY 4 HOURS AS NEEDED FOR WHEEZING    cetirizine (zyrTEC) 10 MG tablet Take 1 tablet by mouth Daily.    [facility] Vitamin B-12 1000 MCG tablet Take 1 tablet by mouth Daily.    DULoxetine (CYMBALTA) 60 MG capsule Take 1 capsule by mouth Daily.    famotidine (PEPCID) 40 MG tablet Take 1 tablet by mouth Every Night.    gabapentin (NEURONTIN) 800 MG tablet TAKE 1 TABLET BY MOUTH 3 TIMES  DAILY MAY TAKE UP TO 4 TIMES  DAILY IF NEEDED (Patient taking differently: Take 1 tablet by mouth 2 (Two) Times a Day.)    hydrOXYzine pamoate  "(VISTARIL) 25 MG capsule Take 1 capsule by mouth 3 (Three) Times a Day As Needed for Anxiety.    losartan (COZAAR) 25 MG tablet Take 1 tablet by mouth Daily.    meloxicam (MOBIC) 15 MG tablet 1/2 to 1 daily as needed for pain (Patient taking differently: Take 1 tablet by mouth Daily. 1/2 to 1 daily as needed for pain)    montelukast (SINGULAIR) 10 MG tablet Take 1 tablet by mouth every night at bedtime.    traZODone (DESYREL) 50 MG tablet Take 1 tablet by mouth every night at bedtime.    dicyclomine (BENTYL) 10 MG capsule Take 1 capsule by mouth 4 (Four) Times a Day Before Meals & at Bedtime. (Patient not taking: Reported on 5/22/2024)     No current facility-administered medications on file prior to visit.       Objective   Vital Signs:   /68 (BP Location: Right arm, Patient Position: Sitting, Cuff Size: Large Adult)   Pulse 72   Temp 97.3 °F (36.3 °C) (Temporal)   Resp 18   Ht 166.4 cm (65.5\")   Wt 96.2 kg (212 lb)   SpO2 97%   BMI 34.74 kg/m²    BP Readings from Last 3 Encounters:   07/22/24 120/68   05/22/24 116/70   01/29/24 112/78     Wt Readings from Last 3 Encounters:   07/22/24 96.2 kg (212 lb)   05/22/24 98.9 kg (218 lb)   01/29/24 97.1 kg (214 lb)         Physical Exam  Vitals and nursing note reviewed.   Constitutional:       General: She is not in acute distress.     Appearance: Normal appearance. She is well-developed. She is obese.   HENT:      Head: Normocephalic and atraumatic.      Right Ear: Tympanic membrane, ear canal and external ear normal. There is no impacted cerumen.      Left Ear: Tympanic membrane and external ear normal. There is no impacted cerumen.      Ears:      Comments: Left external auditory canal with a purple stain  Eyes:      Conjunctiva/sclera: Conjunctivae normal.      Comments: Pupils are equal and round   Pulmonary:      Effort: Pulmonary effort is normal.   Skin:     General: Skin is dry.      Comments: Well-healed curvilinear surgical scar posterior aspect to " right ear.     Neurological:      Mental Status: She is alert and oriented to person, place, and time.      Comments: Minimal ataxia and unsteadiness to the right but able to catch herself easily without getting dangerously off balance   Psychiatric:         Mood and Affect: Mood normal.         Behavior: Behavior normal.         Thought Content: Thought content normal.            No visits with results within 1 Day(s) from this visit.   Latest known visit with results is:   Office Visit on 05/22/2024   Component Date Value Ref Range Status    Hemoglobin A1C 05/22/2024 5.6  4.5 - 5.7 % Final    Lot Number 05/22/2024 [id number]   Final    Expiration Date 05/22/2024 03/31/2026   Final     A1C Last 3 Results          9/12/2023    13:37 5/22/2024    16:04   HGBA1C Last 3 Results   Hemoglobin A1C 5.4  5.6      Lab Results   Component Value Date    CHLPL 186 09/12/2023    TRIG 119 09/12/2023    HDL 58 09/12/2023     (H) 09/12/2023     Lab Results   Component Value Date    TSH 1.320 09/12/2023     Lab Results   Component Value Date    GLUCOSE 89 09/12/2023    BUN 16 01/10/2024    CREATININE 0.83 01/10/2024    EGFRIFNONA 80 12/18/2021    EGFRIFAFRI 92 12/18/2021    BCR 19 09/12/2023    K 4.2 01/10/2024    CO2 25 01/10/2024    CALCIUM 9.5 01/10/2024    PROTENTOTREF 6.7 09/12/2023    ALBUMIN 4.3 09/12/2023    LABIL2 1.8 09/12/2023    AST 19 09/12/2023    ALT 16 09/12/2023     Lab Results   Component Value Date    WBC 7.2 09/12/2023    HGB 14.5 09/12/2023    HCT 44.5 09/12/2023    MCV 88 09/12/2023     09/12/2023         The 10-year ASCVD risk score (Christopher SANCHEZ, et al., 2019) is: 1.7%    Values used to calculate the score:      Age: 54 years      Sex: Female      Is Non- : No      Diabetic: No      Tobacco smoker: No      Systolic Blood Pressure: 120 mmHg      Is BP treated: Yes      HDL Cholesterol: 58 mg/dL      Total Cholesterol: 186 mg/dL             Assessment and Plan    Diagnoses and  all orders for this visit:    1. Obesity (BMI 30-39.9) (Primary)    2. Primary hypertension    3. Mixed hyperlipidemia    4. Vitamin D deficiency    5. History of tobacco use    6. Morbid (severe) obesity due to excess calories  -     phentermine (ADIPEX-P) 37.5 MG tablet; Take 1 tablet by mouth Every Morning Before Breakfast. 1 tablet before breakfast or lunch as needed for appetite suppression.  Dispense: 30 tablet; Refill: 2    7. Non-seasonal allergic rhinitis due to pollen  -     fluticasone (FLONASE) 50 MCG/ACT nasal spray; 2 sprays into the nostril(s) as directed by provider Daily.  Dispense: 48 g; Refill: 3    8. Menopause  -     Calcium Carb-Cholecalciferol (Os-Chinmay Extra D3) 500-15 MG-MCG tablet; Take 2 tablets by mouth Daily.  Dispense: 180 tablet; Refill: 3      Hypertension at goal continue current medications  Obesity, minimal weight loss with Adipex which she has not been taking regularly, advised more regular use and more stringent effort at reduced calorie diet and increase physical activity.  Allergic rhinitis recommend Flonase.  Menopause prescription for Os-Chinmay D for osteoporosis prevention.    Medications Discontinued During This Encounter   Medication Reason    chlorhexidine (PERIDEX) 0.12 % solution *Therapy completed    dexAMETHasone 0.5 MG/5ML solution *Therapy completed    Lidocaine Viscous HCl (XYLOCAINE) 2 % solution *Therapy completed    Semaglutide-Weight Management (Wegovy) 0.25 MG/0.5ML solution auto-injector *Therapy completed    phentermine (ADIPEX-P) 37.5 MG tablet Reorder         Follow Up     No follow-ups on file.    Patient was given instructions and counseling regarding her condition or for health maintenance advice. Please see specific information pulled into the AVS if appropriate.

## 2024-07-22 NOTE — TELEPHONE ENCOUNTER
Called and spoke with [facility], they are faxing over labs drawn on 07/11/2024 now. States we should receive in 10-15 minutes.

## 2024-10-23 NOTE — PROGRESS NOTES
Answers submitted by the patient for this visit:  Primary Reason for Visit (Submitted on 10/25/2024)  What is the primary reason for your visit?: Sore Throat  Sore Throat Questionnaire (Submitted on 10/25/2024)  Chief Complaint: Sore throat  Chronicity: recurrent  Onset: in the past 7 days  Progression since onset: resolved  Pain worse on: left  Fever: no fever  Fever duration: less than 1 day  Pain - numeric: 4/10  abdominal pain: Yes  congestion: Yes  cough: Yes  diarrhea: Yes  drainage: Yes  hoarse voice: Yes  neck pain: Yes  swollen glands: Yes  strep: Yes  Chief Complaint  Obesity and URI    History of Present Illness  Amy L Sturgeon presents today for follow up on weight management and new onset concern of congestion, cough, swollen lymph nodes, body aches, and fatigue    Weight Management  Patient is currently taking phentermine 37.5 mg.  Patient reports they are taking medications as prescribed and they are not having side effects.  Current diet is Well Balanced  Patient has lost  9 lbs in 3 months  Patient is exercising.      Upper Respiratory Infection: Patient complains of symptoms of a URI. Symptoms include congestion, cough, and swollen glands. Onset of symptoms was a few days ago, gradually worsening since that time. She also c/o achiness and fatigue .  She is drinking plenty of fluids. Evaluation to date: none. Treatment to date:  none .    History of Present Illness  The patient is a 54-year-old female who presents for weight management.    She is currently on phentermine for weight management and reports no issues with the medication. She has lost 9 pounds over the past 3 months. She is adhering to a weight loss diet and incorporates walking for 30 minutes at least 4 times a week into her routine. She also engages in yard work as a form of physical activity.    She reports feeling unwell, with symptoms including body aches, cough producing thick yellow phlegm, sore throat, fatigue, and low energy.  She also experiences chills but no fever. She recalls having a fever a month ago.  She was treated last month with doxycycline.  She reports no chest discomfort or wheezing. She has not been using any cough medicine or inhalers. She has previously taken azithromycin without any adverse effects.    IMMUNIZATIONS  She had her last influenza vaccine in 09/2023. She had her last COVID-19 vaccine in 09/2022. She had her shingles vaccine in 08/2023.      Patient Care Team:  Rosa Elena Turcios MD as PCP - General (Family Medicine)  Mariluz Avila as Technologist   Current Outpatient Medications on File Prior to Visit   Medication Sig    albuterol sulfate  (90 Base) MCG/ACT inhaler INHALE 2 PUFFS BY MOUTH  EVERY 4 HOURS AS NEEDED FOR WHEEZING    Calcium Carb-Cholecalciferol (Os-Chinmay Extra D3) 500-15 MG-MCG tablet Take 2 tablets by mouth Daily.    cetirizine (zyrTEC) 10 MG tablet Take 1 tablet by mouth Daily.    [facility] Vitamin B-12 1000 MCG tablet Take 1 tablet by mouth Daily.    DULoxetine (CYMBALTA) 60 MG capsule Take 1 capsule by mouth Daily.    famotidine (PEPCID) 40 MG tablet Take 1 tablet by mouth Every Night.    fluticasone (FLONASE) 50 MCG/ACT nasal spray 2 sprays into the nostril(s) as directed by provider Daily.    gabapentin (NEURONTIN) 800 MG tablet TAKE 1 TABLET BY MOUTH 3 TIMES  DAILY MAY TAKE UP TO 4 TIMES  DAILY IF NEEDED (Patient taking differently: Take 1 tablet by mouth 2 (Two) Times a Day.)    hydrOXYzine pamoate (VISTARIL) 25 MG capsule Take 1 capsule by mouth 3 (Three) Times a Day As Needed for Anxiety.    losartan (COZAAR) 25 MG tablet Take 1 tablet by mouth Daily.    meloxicam (MOBIC) 15 MG tablet 1/2 to 1 daily as needed for pain (Patient taking differently: Take 1 tablet by mouth Daily. 1/2 to 1 daily as needed for pain)    montelukast (SINGULAIR) 10 MG tablet Take 1 tablet by mouth every night at bedtime.    traZODone (DESYREL) 50 MG tablet Take 1 tablet by mouth every night at  "bedtime.    [DISCONTINUED] phentermine (ADIPEX-P) 37.5 MG tablet Take 1 tablet by mouth Every Morning Before Breakfast. 1 tablet before breakfast or lunch as needed for appetite suppression.    dicyclomine (BENTYL) 10 MG capsule Take 1 capsule by mouth 4 (Four) Times a Day Before Meals & at Bedtime. (Patient not taking: Reported on 10/25/2024)     No current facility-administered medications on file prior to visit.       Objective   Vital Signs:   /80 (BP Location: Right arm, Patient Position: Sitting, Cuff Size: Large Adult)   Pulse 104   Temp 98.4 °F (36.9 °C) (Temporal)   Resp 18   Ht 166.4 cm (65.5\")   Wt 92.1 kg (203 lb)   SpO2 100%   BMI 33.27 kg/m²    BP Readings from Last 3 Encounters:   10/25/24 126/80   07/22/24 120/68   05/22/24 116/70     Wt Readings from Last 3 Encounters:   10/25/24 92.1 kg (203 lb)   07/22/24 96.2 kg (212 lb)   05/22/24 98.9 kg (218 lb)         Physical Exam  Vitals and nursing note reviewed.   Constitutional:       General: She is not in acute distress.     Appearance: Normal appearance. She is well-developed. She is obese.      Comments: Somewhat ill-appearing  female in no acute distress   HENT:      Head: Normocephalic and atraumatic.      Right Ear: Tympanic membrane, ear canal and external ear normal. There is no impacted cerumen.      Left Ear: Tympanic membrane, ear canal and external ear normal. There is no impacted cerumen.      Ears:      Comments: Well-healed curvilinear surgical scar posterior to left ear.       Nose: No congestion.      Mouth/Throat:      Mouth: Mucous membranes are moist.      Pharynx: Posterior oropharyngeal erythema present. No oropharyngeal exudate.   Eyes:      Conjunctiva/sclera: Conjunctivae normal.      Comments: Pupils are equal and round   Cardiovascular:      Rate and Rhythm: Normal rate and regular rhythm.      Heart sounds: Normal heart sounds.   Pulmonary:      Effort: Pulmonary effort is normal. No respiratory " distress.      Breath sounds: Normal breath sounds. No stridor. No wheezing, rhonchi or rales.   Chest:      Chest wall: No tenderness.   Skin:     General: Skin is dry.   Neurological:      Mental Status: She is alert and oriented to person, place, and time.   Psychiatric:         Mood and Affect: Mood normal.         Behavior: Behavior normal.         Thought Content: Thought content normal.          Physical Exam        Office Visit on 10/25/2024   Component Date Value Ref Range Status    SARS Antigen 10/25/2024 Not Detected  Not Detected, Presumptive Negative Final    Influenza A Antigen EDMAR 10/25/2024 Not Detected  Not Detected Final    Influenza B Antigen EDMAR 10/25/2024 Not Detected  Not Detected Final    Internal Control 10/25/2024 Passed  Passed Final    Lot Number 10/25/2024 [id number]   Final    Expiration Date 10/25/2024 09/04/2025   Final     A1C Last 3 Results          5/22/2024    16:04   HGBA1C Last 3 Results   Hemoglobin A1C 5.6      Lab Results   Component Value Date    CHLPL 186 09/12/2023    TRIG 119 09/12/2023    HDL 58 09/12/2023     (H) 09/12/2023     Lab Results   Component Value Date    TSH 1.320 09/12/2023     Lab Results   Component Value Date    GLUCOSE 89 09/12/2023    BUN 16 01/10/2024    CREATININE 0.9 08/05/2024    EGFRIFNONA 80 12/18/2021    EGFRIFAFRI 92 12/18/2021    BCR 19 09/12/2023    K 4.2 01/10/2024    CO2 25 01/10/2024    CALCIUM 9.5 01/10/2024    PROTENTOTREF 6.7 09/12/2023    ALBUMIN 4.3 09/12/2023    LABIL2 1.8 09/12/2023    AST 19 09/12/2023    ALT 16 09/12/2023     Lab Results   Component Value Date    WBC 7.2 09/12/2023    HGB 14.5 09/12/2023    HCT 44.5 09/12/2023    MCV 88 09/12/2023     09/12/2023             Results               Assessment and Plan    Diagnoses and all orders for this visit:    1. Obesity (BMI 30-39.9) (Primary)    2. Morbid (severe) obesity due to excess calories  -     phentermine (ADIPEX-P) 37.5 MG tablet; Take 1 tablet by mouth  Every Morning Before Breakfast. 1 tablet before breakfast or lunch as needed for appetite suppression.  Dispense: 30 tablet; Refill: 2    3. Acute cough  -     POCT SARS-CoV-2 + Flu Antigen EDMAR    4. Nasal congestion  -     POCT SARS-CoV-2 + Flu Antigen EDMAR    5. Swollen lymph nodes  -     POCT SARS-CoV-2 + Flu Antigen EDMAR    6. Body aches  -     POCT SARS-CoV-2 + Flu Antigen EDMAR    7. History of tobacco use    8. Bronchitis  -     azithromycin (ZITHROMAX) 250 MG tablet; Take 2 tablets the first day, then 1 tablet daily for 4 days.  Dispense: 6 tablet; Refill: 0        Assessment & Plan  1. Bronchitis.  Her symptoms, including coughing up thick yellow mucus, throat pain, and body aches, are indicative of bronchitis rather than pneumonia, given the absence of fever and shortness of breath. Azithromycin was prescribed, to be taken 2 today and once daily for the following four days. Over-the-counter medications such as Mucinex, guaifenesin, or Robitussin were recommended for symptom relief. She was advised to receive her influenza and COVID-19 vaccines once her condition improves.    2. Weight management.  She has achieved a weight loss of 15 pounds since April or May 2024. A refill of her phentermine prescription was provided. She was encouraged to maintain her physical activity and dietary vigilance, aiming for a total of 150 minutes of exercise per week.    Follow-up  Return in 3 months for weight management.      Medications Discontinued During This Encounter   Medication Reason    phentermine (ADIPEX-P) 37.5 MG tablet Reorder         Follow Up     Return in about 3 months (around 1/25/2025) for Recheck weight.    Patient was given instructions and counseling regarding her condition or for health maintenance advice. Please see specific information pulled into the AVS if appropriate.     Patient or patient representative verbalized consent for the use of Ambient Listening during the visit with  Rosa Elena Blue  MD Tam for chart documentation. 10/25/2024  15:09 EDT

## 2024-10-25 ENCOUNTER — OFFICE VISIT (OUTPATIENT)
Dept: FAMILY MEDICINE CLINIC | Facility: CLINIC | Age: 54
End: 2024-10-25
Payer: MEDICARE

## 2024-10-25 VITALS
RESPIRATION RATE: 18 BRPM | OXYGEN SATURATION: 100 % | TEMPERATURE: 98.4 F | BODY MASS INDEX: 32.62 KG/M2 | HEART RATE: 104 BPM | SYSTOLIC BLOOD PRESSURE: 126 MMHG | HEIGHT: 66 IN | WEIGHT: 203 LBS | DIASTOLIC BLOOD PRESSURE: 80 MMHG

## 2024-10-25 DIAGNOSIS — J40 BRONCHITIS: ICD-10-CM

## 2024-10-25 DIAGNOSIS — E66.01 MORBID (SEVERE) OBESITY DUE TO EXCESS CALORIES: ICD-10-CM

## 2024-10-25 DIAGNOSIS — E66.9 OBESITY (BMI 30-39.9): Primary | ICD-10-CM

## 2024-10-25 DIAGNOSIS — R59.9 SWOLLEN LYMPH NODES: ICD-10-CM

## 2024-10-25 DIAGNOSIS — R52 BODY ACHES: ICD-10-CM

## 2024-10-25 DIAGNOSIS — R05.1 ACUTE COUGH: ICD-10-CM

## 2024-10-25 DIAGNOSIS — R09.81 NASAL CONGESTION: ICD-10-CM

## 2024-10-25 DIAGNOSIS — Z87.891 HISTORY OF TOBACCO USE: ICD-10-CM

## 2024-10-25 LAB
EXPIRATION DATE: NORMAL
FLUAV AG UPPER RESP QL IA.RAPID: NOT DETECTED
FLUBV AG UPPER RESP QL IA.RAPID: NOT DETECTED
INTERNAL CONTROL: NORMAL
Lab: NORMAL
SARS-COV-2 AG UPPER RESP QL IA.RAPID: NOT DETECTED

## 2024-10-25 PROCEDURE — G2211 COMPLEX E/M VISIT ADD ON: HCPCS | Performed by: FAMILY MEDICINE

## 2024-10-25 PROCEDURE — 99214 OFFICE O/P EST MOD 30 MIN: CPT | Performed by: FAMILY MEDICINE

## 2024-10-25 PROCEDURE — 3074F SYST BP LT 130 MM HG: CPT | Performed by: FAMILY MEDICINE

## 2024-10-25 PROCEDURE — 87428 SARSCOV & INF VIR A&B AG IA: CPT | Performed by: FAMILY MEDICINE

## 2024-10-25 PROCEDURE — 3079F DIAST BP 80-89 MM HG: CPT | Performed by: FAMILY MEDICINE

## 2024-10-25 PROCEDURE — 1126F AMNT PAIN NOTED NONE PRSNT: CPT | Performed by: FAMILY MEDICINE

## 2024-10-25 RX ORDER — PHENTERMINE HYDROCHLORIDE 37.5 MG/1
37.5 TABLET ORAL
Qty: 30 TABLET | Refills: 2 | Status: SHIPPED | OUTPATIENT
Start: 2024-10-25

## 2024-10-25 RX ORDER — AZITHROMYCIN 250 MG/1
TABLET, FILM COATED ORAL
Qty: 6 TABLET | Refills: 0 | Status: SHIPPED | OUTPATIENT
Start: 2024-10-25

## 2024-11-13 ENCOUNTER — TELEPHONE (OUTPATIENT)
Dept: FAMILY MEDICINE CLINIC | Facility: CLINIC | Age: 54
End: 2024-11-13
Payer: MEDICARE

## 2024-11-19 DIAGNOSIS — F33.41 RECURRENT MAJOR DEPRESSIVE DISORDER, IN PARTIAL REMISSION: ICD-10-CM

## 2024-11-19 RX ORDER — DULOXETIN HYDROCHLORIDE 60 MG/1
60 CAPSULE, DELAYED RELEASE ORAL DAILY
Qty: 90 CAPSULE | Refills: 3 | Status: SHIPPED | OUTPATIENT
Start: 2024-11-19

## 2024-11-21 ENCOUNTER — TELEPHONE (OUTPATIENT)
Dept: FAMILY MEDICINE CLINIC | Facility: CLINIC | Age: 54
End: 2024-11-21
Payer: MEDICARE

## 2024-11-21 NOTE — TELEPHONE ENCOUNTER
"Per Dr. Turcios,  \"Please request official report from [facility] mammogram dated 7/11/2024\"    Spoke with Rakel at [facility] medical records and is going to fax over those records now.  "

## 2024-11-30 DIAGNOSIS — F51.01 PRIMARY INSOMNIA: ICD-10-CM

## 2024-11-30 NOTE — ANESTHESIA PREPROCEDURE EVALUATION
Anesthesia Evaluation     Patient summary reviewed   no history of anesthetic complications:  NPO Solid Status: > 8 hours  NPO Liquid Status: > 2 hours           Airway   Mallampati: I  TM distance: >3 FB  Neck ROM: full  No difficulty expected  Dental - normal exam     Pulmonary - normal exam   (+) asthma,sleep apnea,   Cardiovascular - normal exam    (+) hypertension, hyperlipidemia,       Neuro/Psych  (+) dizziness/light headedness, numbness, psychiatric history Anxiety and Depression,    GI/Hepatic/Renal/Endo    (+) obesity,  GERD,      Musculoskeletal     (+) chronic pain, myalgias,   Abdominal  - normal exam   Substance History      OB/GYN          Other   arthritis, autoimmune disease lupus,                      Anesthesia Plan    ASA 2     general     intravenous induction     Anesthetic plan, risks, benefits, and alternatives have been provided, discussed and informed consent has been obtained with: patient.  Pre-procedure education provided  Use of blood products discussed with patient  Consented to blood products.       CODE STATUS:        no

## 2024-12-02 RX ORDER — TRAZODONE HYDROCHLORIDE 50 MG/1
50 TABLET, FILM COATED ORAL
Qty: 100 TABLET | Refills: 3 | Status: SHIPPED | OUTPATIENT
Start: 2024-12-02

## 2024-12-07 DIAGNOSIS — M54.16 LUMBAR BACK PAIN WITH RADICULOPATHY AFFECTING RIGHT LOWER EXTREMITY: ICD-10-CM

## 2024-12-07 DIAGNOSIS — M79.10 MYALGIA: ICD-10-CM

## 2024-12-09 RX ORDER — MELOXICAM 15 MG/1
TABLET ORAL
Qty: 100 TABLET | Refills: 0 | Status: SHIPPED | OUTPATIENT
Start: 2024-12-09

## 2024-12-11 DIAGNOSIS — I10 ESSENTIAL HYPERTENSION: ICD-10-CM

## 2024-12-11 RX ORDER — LOSARTAN POTASSIUM 25 MG/1
25 TABLET ORAL DAILY
Qty: 100 TABLET | Refills: 0 | Status: SHIPPED | OUTPATIENT
Start: 2024-12-11

## 2024-12-12 DIAGNOSIS — J45.40 MODERATE PERSISTENT ASTHMA, UNSPECIFIED WHETHER COMPLICATED: ICD-10-CM

## 2024-12-12 RX ORDER — MONTELUKAST SODIUM 10 MG/1
10 TABLET ORAL
Qty: 100 TABLET | Refills: 3 | Status: SHIPPED | OUTPATIENT
Start: 2024-12-12

## 2024-12-18 DIAGNOSIS — K21.9 GASTROESOPHAGEAL REFLUX DISEASE WITHOUT ESOPHAGITIS: ICD-10-CM

## 2024-12-18 RX ORDER — FAMOTIDINE 40 MG/1
40 TABLET, FILM COATED ORAL
Qty: 100 TABLET | Refills: 3 | Status: SHIPPED | OUTPATIENT
Start: 2024-12-18

## 2025-01-08 ENCOUNTER — PATIENT MESSAGE (OUTPATIENT)
Dept: FAMILY MEDICINE CLINIC | Facility: CLINIC | Age: 55
End: 2025-01-08
Payer: MEDICARE

## 2025-01-08 DIAGNOSIS — K21.9 GASTROESOPHAGEAL REFLUX DISEASE WITHOUT ESOPHAGITIS: Primary | ICD-10-CM

## 2025-01-08 RX ORDER — OMEPRAZOLE 40 MG/1
40 CAPSULE, DELAYED RELEASE ORAL EVERY MORNING
Qty: 90 CAPSULE | Refills: 0 | Status: SHIPPED | OUTPATIENT
Start: 2025-01-08 | End: 2025-01-08

## 2025-01-08 RX ORDER — OMEPRAZOLE 40 MG/1
40 CAPSULE, DELAYED RELEASE ORAL EVERY MORNING
Qty: 90 CAPSULE | Refills: 0 | Status: SHIPPED | OUTPATIENT
Start: 2025-01-08

## 2025-02-06 NOTE — PROGRESS NOTES
Chief Complaint  Weight Check    History of Present Illness  Amy L Sturgeon presents today for follow up on weight management    Weight Management  Patient is currently taking phentermine.  Patient reports they are taking medications as prescribed and they are not having side effects.  Current diet is Well Balanced  Patient has gained  4 lbs in 3 months  Patient is exercising.     History of Present Illness  The patient is a 54-year-old female who presents for follow-up on weight management and is requesting a refill of phentermine.    She has been adhering to her phentermine regimen, which she reports as beneficial in suppressing her appetite. Prior to this week, she had initiated a walking routine but was unable to continue due to feeling unwell. She acknowledges the need to resume her walking regimen. She finds it challenging to maintain consistent weight loss efforts. She has considered alternative medications but finds them financially prohibitive. She has not previously used Mack, orlistat, or Xenical. She does not believe her insurance covers any weight loss medications.    She experienced an episode of severe pressure from her sphincter of Oddi this morning, which she describes as excruciating and continues to cause discomfort. She has been experiencing diarrhea throughout the week, which stopped after a single dose of Imodium yesterday, resulting in cessation of symptoms today. She reports no fever. Earlier in the week, she experienced extreme fatigue, necessitating two days of sleep. She has not taken any antiemetic medication for her nausea. She has been prescribed Bentyl or dicyclomine. She has previously consulted a gastroenterologist and underwent stent placement in the sphincter of Oddi. She does not recall the name of her current gastroenterologist. She has undergone cholecystectomy. She has not been prescribed nitroglycerin. She was advised to seek emergency care if she experiences uncontrollable  vomiting. She does not have any scheduled appointments with her gastroenterologist. She has previously taken Pepcid. She was advised to consult her gastroenterologist as needed following her balloon procedure.    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 She has not received an influenza vaccine this season. She is uncertain about the timing of her last COVID-19 vaccine but believes it may have been administered concurrently with her shingles vaccine.    MEDICATIONS  Current: Phentermine, Imodium, Pepcid    IMMUNIZATIONS  She received her last COVID-19 vaccine in February 2024.      Patient Care Team:  Rosa Elena Turcios MD as PCP - General (Family Medicine)  Mariluz Avila as Technologist   Current Outpatient Medications on File Prior to Visit   Medication Sig    albuterol sulfate  (90 Base) MCG/ACT inhaler INHALE 2 PUFFS BY MOUTH  EVERY 4 HOURS AS NEEDED FOR WHEEZING    cetirizine (zyrTEC) 10 MG tablet Take 1 tablet by mouth Daily.    [facility] Vitamin B-12 1000 MCG tablet Take 1 tablet by mouth Daily.    DULoxetine (CYMBALTA) 60 MG capsule TAKE ONE CAPSULE BY MOUTH EVERY DAY    fluticasone (FLONASE) 50 MCG/ACT nasal spray 2 sprays into the nostril(s) as directed by provider Daily.    gabapentin (NEURONTIN) 800 MG tablet TAKE 1 TABLET BY MOUTH 3 TIMES  DAILY MAY TAKE UP TO 4 TIMES  DAILY IF NEEDED (Patient taking differently: Take 1 tablet by mouth 2 (Two) Times a Day.)    hydrOXYzine pamoate (VISTARIL) 25 MG capsule Take 1 capsule by mouth 3 (Three) Times a Day As Needed for Anxiety.    losartan (COZAAR) 25 MG tablet TAKE 1 TABLET BY MOUTH DAILY.    meloxicam (MOBIC) 15 MG tablet TAKE ONE-HALF TABLET TO ONE TABLET BY MOUTH DAILY AS NEEDED FOR PAIN (Patient taking differently: Take 1 tablet by mouth Daily. TAKE ONE-HALF TABLET TO ONE TABLET BY MOUTH DAILY AS NEEDED  "FOR PAIN)    montelukast (SINGULAIR) 10 MG tablet TAKE 1 TABLET BY MOUTH EVERY NIGHT AT BEDTIME. (Patient taking differently: Take 1 tablet by mouth Daily.)    omeprazole (priLOSEC) 40 MG capsule Take 1 capsule by mouth Every Morning.    traZODone (DESYREL) 50 MG tablet TAKE 1 TABLET BY MOUTH EVERY NIGHT AT BEDTIME.    [DISCONTINUED] phentermine (ADIPEX-P) 37.5 MG tablet Take 1 tablet by mouth Every Morning Before Breakfast. 1 tablet before breakfast or lunch as needed for appetite suppression.    azithromycin (ZITHROMAX) 250 MG tablet Take 2 tablets the first day, then 1 tablet daily for 4 days. (Patient not taking: Reported on 2/7/2025)    Calcium Carb-Cholecalciferol (Os-Chinmay Extra D3) 500-15 MG-MCG tablet Take 2 tablets by mouth Daily. (Patient not taking: Reported on 2/7/2025)    dicyclomine (BENTYL) 10 MG capsule Take 1 capsule by mouth 4 (Four) Times a Day Before Meals & at Bedtime. (Patient not taking: Reported on 5/22/2024)    famotidine (PEPCID) 40 MG tablet TAKE ONE TABLET BY MOUTH EVERY NIGHT (Patient not taking: Reported on 2/7/2025)     No current facility-administered medications on file prior to visit.       Objective   Vital Signs:   /88 (BP Location: Right arm, Patient Position: Sitting, Cuff Size: Large Adult)   Pulse 61   Temp 98.6 °F (37 °C) (Temporal)   Resp 18   Ht 166.4 cm (65.5\")   Wt 94.1 kg (207 lb 6.4 oz)   SpO2 97%   BMI 33.99 kg/m²    BP Readings from Last 3 Encounters:   02/07/25 130/88   10/25/24 126/80   07/22/24 120/68     Wt Readings from Last 3 Encounters:   02/07/25 94.1 kg (207 lb 6.4 oz)   10/25/24 92.1 kg (203 lb)   07/22/24 96.2 kg (212 lb)         Physical Exam     Physical Exam        Office Visit on 02/07/2025   Component Date Value Ref Range Status    SARS Antigen 02/07/2025 Not Detected  Not Detected, Presumptive Negative Final    Influenza A Antigen EDMAR 02/07/2025 Not Detected  Not Detected Final    Influenza B Antigen EDMAR 02/07/2025 Not Detected  Not " Detected Final    Internal Control 02/07/2025 Passed  Passed Final    Lot Number 02/07/2025 #[id number]   Final    Expiration Date 02/07/2025 11/14/2025   Final     A1C Last 3 Results          5/22/2024    16:04   HGBA1C Last 3 Results   Hemoglobin A1C 5.6      Lab Results   Component Value Date    CHLPL 186 09/12/2023    TRIG 119 09/12/2023    HDL 58 09/12/2023     (H) 09/12/2023     Lab Results   Component Value Date    TSH 1.320 09/12/2023     Lab Results   Component Value Date    GLUCOSE 89 09/12/2023    BUN 16 01/10/2024    CREATININE 0.9 08/05/2024    EGFRIFNONA 80 12/18/2021    EGFRIFAFRI 92 12/18/2021    BCR 19 09/12/2023    K 4.2 01/10/2024    CO2 25 01/10/2024    CALCIUM 9.5 01/10/2024    PROTENTOTREF 6.7 09/12/2023    ALBUMIN 4.3 09/12/2023    LABIL2 1.8 09/12/2023    AST 19 09/12/2023    ALT 16 09/12/2023     Lab Results   Component Value Date    WBC 7.2 09/12/2023    HGB 14.5 09/12/2023    HCT 44.5 09/12/2023    MCV 88 09/12/2023     09/12/2023             Results  Laboratory Studies  Influenza A and B, and COVID-19 test were negative.             Assessment and Plan    Diagnoses and all orders for this visit:    1. Class 1 obesity with body mass index (BMI) of 33.0 to 33.9 in adult, unspecified obesity type, unspecified whether serious comorbidity present (Primary)  -     orlistat (Mack) 60 MG capsule; Take 1 capsule by mouth 3 (Three) Times a Day With Meals. Start one daily for 3 days 2 daily for 3 days then 1 tid if tolerated  Dispense: 90 capsule; Refill: 2    2. Morbid (severe) obesity due to excess calories  -     phentermine (ADIPEX-P) 37.5 MG tablet; Take 1 tablet by mouth Every Morning Before Breakfast. 1 tablet before breakfast or lunch as needed for appetite suppression.  Dispense: 30 tablet; Refill: 2    3. History of tobacco use    4. Need for COVID-19 vaccine    5. Need for influenza vaccination  -     Cancel: Fluzone >6mos  -     Fluzone >6mos    6. Sphincter of Oddi  spasm    7. Diarrhea, unspecified type  -     POCT SARS-CoV-2 + Flu Antigen EDMAR        Assessment & Plan  1. Weight management.  Her weight has remained stable, indicating a lack of progress in her weight loss journey. She is advised to engage in physical activity for a total of 150 minutes per week, which can be divided into 30-minute sessions over 5 days. Dietary recommendations include low-calorie foods, increased intake of vegetables, fruits, and whole grains, and reduced consumption of cheeses, sauces, butters, and fats. Lean cuts of meat are recommended to further reduce fat intake. She is encouraged to continue her walking regimen and monitor her diet closely. A prescription for phentermine will be sent to [facility] in May.  She is also advised to start jackelin, an over-the-counter medication, one tablet with a meal per day for the first few days to monitor for side effects. The goal is to increase the dosage to one tablet before each meal, three times a day. If she plans to consume a high-fat meal, she should skip the jackelin dose for that meal. If the over-the-counter strength of jackelin is insufficient, the dosage can be increased to three times a day at prescription strength of 120 mg.    2. Sphincter of Oddi dysfunction.  She experienced a severe attack this morning, lasting approximately 6 hours. She has a history of similar episodes and has previously seen a gastroenterologist, Dr. Banks, who performed an ERCP and sphincterotomy in October 2022. She is advised to keep a journal of her symptoms, including frequency, severity, and potential triggers. She is also encouraged to contact Dr. Banks at [facility] to schedule an appointment due to the recurrence of spasms.    3. Diarrhea.  She has experienced severe diarrhea for the past week, which has improved after taking Imodium. She reports no fever but had severe fatigue earlier in the week. A test for influenza A, B, and COVID-19 was  conducted today, which returned negative. Given the improvement in symptoms, no further testing for norovirus or other gastrointestinal viruses is deemed necessary at this time.    4. Health maintenance.  She will receive her influenza vaccine today. She is advised to consider getting the COVID-19 booster, as her last dose was in February 2024.    Follow-up  The patient will follow up in 3 months.    PROCEDURE  The patient underwent stent placement by a gastroenterologist in the past.  She also had a cholecystectomy.  In October 2022, she had an ERCP and sphincterotomy with clearance of the bile duct.      Medications Discontinued During This Encounter   Medication Reason    phentermine (ADIPEX-P) 37.5 MG tablet Reorder         Follow Up     Return in about 3 months (around 5/7/2025) for Recheck weight.    Patient was given instructions and counseling regarding her condition or for health maintenance advice. Please see specific information pulled into the AVS if appropriate.     Patient or patient representative verbalized consent for the use of Ambient Listening during the visit with  Rosa Elena Turcios MD for chart documentation. 2/7/2025  17:12 EST

## 2025-02-07 ENCOUNTER — OFFICE VISIT (OUTPATIENT)
Dept: FAMILY MEDICINE CLINIC | Facility: CLINIC | Age: 55
End: 2025-02-07
Payer: MEDICARE

## 2025-02-07 VITALS
RESPIRATION RATE: 18 BRPM | OXYGEN SATURATION: 97 % | TEMPERATURE: 98.6 F | WEIGHT: 207.4 LBS | SYSTOLIC BLOOD PRESSURE: 130 MMHG | DIASTOLIC BLOOD PRESSURE: 88 MMHG | BODY MASS INDEX: 33.33 KG/M2 | HEIGHT: 66 IN | HEART RATE: 61 BPM

## 2025-02-07 DIAGNOSIS — Z23 NEED FOR INFLUENZA VACCINATION: ICD-10-CM

## 2025-02-07 DIAGNOSIS — E66.01 MORBID (SEVERE) OBESITY DUE TO EXCESS CALORIES: ICD-10-CM

## 2025-02-07 DIAGNOSIS — Z23 NEED FOR COVID-19 VACCINE: ICD-10-CM

## 2025-02-07 DIAGNOSIS — Z87.891 HISTORY OF TOBACCO USE: ICD-10-CM

## 2025-02-07 DIAGNOSIS — R19.7 DIARRHEA, UNSPECIFIED TYPE: ICD-10-CM

## 2025-02-07 DIAGNOSIS — E66.811 CLASS 1 OBESITY WITH BODY MASS INDEX (BMI) OF 33.0 TO 33.9 IN ADULT, UNSPECIFIED OBESITY TYPE, UNSPECIFIED WHETHER SERIOUS COMORBIDITY PRESENT: Primary | ICD-10-CM

## 2025-02-07 DIAGNOSIS — K83.4 SPHINCTER OF ODDI SPASM: ICD-10-CM

## 2025-02-07 PROCEDURE — 99214 OFFICE O/P EST MOD 30 MIN: CPT | Performed by: FAMILY MEDICINE

## 2025-02-07 PROCEDURE — 90656 IIV3 VACC NO PRSV 0.5 ML IM: CPT | Performed by: FAMILY MEDICINE

## 2025-02-07 PROCEDURE — 3075F SYST BP GE 130 - 139MM HG: CPT | Performed by: FAMILY MEDICINE

## 2025-02-07 PROCEDURE — G0008 ADMIN INFLUENZA VIRUS VAC: HCPCS | Performed by: FAMILY MEDICINE

## 2025-02-07 PROCEDURE — 1126F AMNT PAIN NOTED NONE PRSNT: CPT | Performed by: FAMILY MEDICINE

## 2025-02-07 PROCEDURE — 3079F DIAST BP 80-89 MM HG: CPT | Performed by: FAMILY MEDICINE

## 2025-02-07 PROCEDURE — 87428 SARSCOV & INF VIR A&B AG IA: CPT | Performed by: FAMILY MEDICINE

## 2025-02-07 RX ORDER — PHENTERMINE HYDROCHLORIDE 37.5 MG/1
37.5 TABLET ORAL
Qty: 30 TABLET | Refills: 2 | Status: SHIPPED | OUTPATIENT
Start: 2025-02-07

## 2025-03-06 DIAGNOSIS — M79.10 MYALGIA: ICD-10-CM

## 2025-03-06 DIAGNOSIS — M54.16 LUMBAR BACK PAIN WITH RADICULOPATHY AFFECTING RIGHT LOWER EXTREMITY: ICD-10-CM

## 2025-03-07 RX ORDER — MELOXICAM 15 MG/1
TABLET ORAL
Qty: 100 TABLET | Refills: 0 | Status: SHIPPED | OUTPATIENT
Start: 2025-03-07

## 2025-03-10 DIAGNOSIS — I10 ESSENTIAL HYPERTENSION: ICD-10-CM

## 2025-03-10 RX ORDER — LOSARTAN POTASSIUM 25 MG/1
25 TABLET ORAL DAILY
Qty: 100 TABLET | Refills: 0 | Status: SHIPPED | OUTPATIENT
Start: 2025-03-10

## 2025-03-10 NOTE — TELEPHONE ENCOUNTER
Please inform patient I am renewing losartan as requested however I do not see any future appointments scheduled.  Had intended her to return in May for her weight blood pressure check.  Please have her schedule.

## 2025-03-21 DIAGNOSIS — I10 PRIMARY HYPERTENSION: ICD-10-CM

## 2025-03-21 DIAGNOSIS — E55.9 VITAMIN D DEFICIENCY: ICD-10-CM

## 2025-03-21 DIAGNOSIS — E53.8 B12 DEFICIENCY: ICD-10-CM

## 2025-03-21 DIAGNOSIS — R74.01: ICD-10-CM

## 2025-03-21 DIAGNOSIS — E87.5 HYPERKALEMIA: ICD-10-CM

## 2025-03-21 DIAGNOSIS — E78.2 MIXED HYPERLIPIDEMIA: Primary | ICD-10-CM

## 2025-03-21 DIAGNOSIS — R73.09 ELEVATED HEMOGLOBIN A1C: ICD-10-CM

## 2025-03-21 DIAGNOSIS — M54.16 LUMBAR BACK PAIN WITH RADICULOPATHY AFFECTING RIGHT LOWER EXTREMITY: ICD-10-CM

## 2025-03-21 DIAGNOSIS — Z13.29 SCREENING FOR THYROID DISORDER: ICD-10-CM

## 2025-03-21 RX ORDER — GABAPENTIN 800 MG/1
800 TABLET ORAL 2 TIMES DAILY
Qty: 400 TABLET | Refills: 0 | Status: SHIPPED | OUTPATIENT
Start: 2025-03-21

## 2025-03-21 NOTE — TELEPHONE ENCOUNTER
Caller: SturgeonMarcela    Relationship: Self    Best call back number: [phone]     Requested Prescriptions:   Requested Prescriptions     Pending Prescriptions Disp Refills    gabapentin (NEURONTIN) 800 MG tablet 400 tablet 0        [facility], [address] [phone] [facility] [phone]      Last office visit with prescribing clinician: 2/7/2025   Last telemedicine visit with prescribing clinician: Visit date not found   Next office visit with prescribing clinician: 5/12/2025     Additional details provided by patient: 90 DAY SUPPLY    Does the patient have less than a 3 day supply:  [] Yes  [] No    Would you like a call back once the refill request has been completed: [] Yes [] No    If the office needs to give you a call back, can they leave a voicemail: [] Yes [] No    Inés Roland Rep   03/21/25 10:04 EDT

## 2025-03-21 NOTE — TELEPHONE ENCOUNTER
Please inform patient I am renewing gabapentin as requested however she has not had labs in the past year.  She is due for an annual exam.  I am placing lab orders and request she schedule an annual physical.

## 2025-05-09 NOTE — PROGRESS NOTES
"Chief Complaint  Weight Management    History of Present Illness  Amy L Sturgeon presents today for follow up on weight management    Weight Management  Patient is currently taking Phentermine.  Patient reports they are taking medications as prescribed and they are not having side effects.  Patient does not feel the medication curves her appetite as much as it used to.  Current diet is Well Balanced  Patient has gained  8 lbs in 3 months  Patient is exercising occasionally.      Vitals:    05/12/25 1303 05/12/25 1307   BP: 142/90 130/92   BP Location: Right arm Right arm   Patient Position: Sitting Sitting   Cuff Size: Large Adult Large Adult   Pulse: 96    Resp: 18    Temp: 97.1 °F (36.2 °C)    TempSrc: Temporal    SpO2: 98%    Weight: 97.5 kg (215 lb)    Height: 166.4 cm (65.5\")       History of Present Illness  The patient is a 55-year-old female who presents today for weight management and her Medicare wellness appointment.    She reports that her current medication, phentermine, is no longer effective in controlling her appetite. She has not tried Mack or orlistat due to concerns about potential diarrhea exacerbation from her irritable bowel syndrome. She expresses a desire to lose an additional 15 pounds to bring her weight below 200 pounds, attributing her weight gain to comfort eating and fatigue. She has been incorporating more fruits, vegetables, and whole grains into her diet and avoids sugary drinks. She has considered GLP-1 injections but decided against them due to potential gastrointestinal side effects. She plans to continue with phentermine and supplement it with over-the-counter B12. She has been taking Lipo NAD for the past 2 months, which she believes has improved her energy levels.    She occasionally experiences stomach cramps but does not take any medication for them. She has Bentyl at home but has not used it recently. She also reports occasional diarrhea. She takes famotidine as needed and " does not require refills. She takes omeprazole routinely.    She reports that her blood pressure has been well-controlled, except for today when she experienced stress. She monitors her blood pressure at home and is on losartan 25 mg daily.    She does not use albuterol more than 3 times a week. She takes Singulair in the morning and Zyrtec at night. She uses albuterol as needed.    She is not currently taking B12 supplements.    She is on gabapentin 800 mg twice a day, meloxicam 15 mg daily, trazodone 1 pill at night, and duloxetine 60 mg. She is taking Viactiv calcium with vitamin D supplement daily. She reports no sleep disturbances but does have chronic dry mouth.      Patient Care Team:  Rosa Elena Turcios MD as PCP - General (Family Medicine)  Mariluz Avila as Technologist   Current Outpatient Medications on File Prior to Visit   Medication Sig    albuterol sulfate  (90 Base) MCG/ACT inhaler INHALE 2 PUFFS BY MOUTH  EVERY 4 HOURS AS NEEDED FOR WHEEZING    cetirizine (zyrTEC) 10 MG tablet Take 1 tablet by mouth Daily.    DULoxetine (CYMBALTA) 60 MG capsule TAKE ONE CAPSULE BY MOUTH EVERY DAY    fluticasone (FLONASE) 50 MCG/ACT nasal spray 2 sprays into the nostril(s) as directed by provider Daily.    gabapentin (NEURONTIN) 800 MG tablet Take 1 tablet by mouth 2 (Two) Times a Day.    hydrOXYzine pamoate (VISTARIL) 25 MG capsule Take 1 capsule by mouth 3 (Three) Times a Day As Needed for Anxiety.    losartan (COZAAR) 25 MG tablet TAKE ONE TABLET BY MOUTH EVERY DAY    meloxicam (MOBIC) 15 MG tablet TAKE ONE-HALF TABLET TO ONE TABLET BY MOUTH DAILY AS NEEDED FOR PAIN    montelukast (SINGULAIR) 10 MG tablet TAKE 1 TABLET BY MOUTH EVERY NIGHT AT BEDTIME. (Patient taking differently: Take 1 tablet by mouth Daily.)    omeprazole (priLOSEC) 40 MG capsule Take 1 capsule by mouth Every Morning.    traZODone (DESYREL) 50 MG tablet TAKE 1 TABLET BY MOUTH EVERY NIGHT AT BEDTIME.    [DISCONTINUED]  "phentermine (ADIPEX-P) 37.5 MG tablet Take 1 tablet by mouth Every Morning Before Breakfast. 1 tablet before breakfast or lunch as needed for appetite suppression.    dicyclomine (BENTYL) 10 MG capsule Take 1 capsule by mouth 4 (Four) Times a Day Before Meals & at Bedtime. (Patient not taking: Reported on 5/12/2025)    famotidine (PEPCID) 40 MG tablet TAKE ONE TABLET BY MOUTH EVERY NIGHT (Patient not taking: Reported on 5/12/2025)    [DISCONTINUED] azithromycin (ZITHROMAX) 250 MG tablet Take 2 tablets the first day, then 1 tablet daily for 4 days. (Patient not taking: Reported on 5/12/2025)    [DISCONTINUED] Calcium Carb-Cholecalciferol (Os-Chinmay Extra D3) 500-15 MG-MCG tablet Take 2 tablets by mouth Daily. (Patient not taking: Reported on 5/12/2025)    [DISCONTINUED] [facility] Vitamin B-12 1000 MCG tablet Take 1 tablet by mouth Daily. (Patient not taking: Reported on 5/12/2025)    [DISCONTINUED] orlistat (Mack) 60 MG capsule Take 1 capsule by mouth 3 (Three) Times a Day With Meals. Start one daily for 3 days 2 daily for 3 days then 1 tid if tolerated (Patient not taking: Reported on 5/12/2025)     No current facility-administered medications on file prior to visit.       Objective   Vital Signs:   /92 (BP Location: Right arm, Patient Position: Sitting, Cuff Size: Large Adult)   Pulse 96   Temp 97.1 °F (36.2 °C) (Temporal)   Resp 18   Ht 166.4 cm (65.5\")   Wt 97.5 kg (215 lb)   SpO2 98%   BMI 35.23 kg/m²    BP Readings from Last 3 Encounters:   05/12/25 130/92   02/07/25 130/88   10/25/24 126/80     Wt Readings from Last 3 Encounters:   05/12/25 97.5 kg (215 lb)   02/07/25 94.1 kg (207 lb 6.4 oz)   10/25/24 92.1 kg (203 lb)         Physical Exam  Vitals and nursing note reviewed.   Constitutional:       General: She is not in acute distress.     Appearance: She is well-developed. She is obese. She is not ill-appearing.   HENT:      Head: Normocephalic and atraumatic.   Cardiovascular:      Rate and Rhythm: " Normal rate and regular rhythm.      Heart sounds: No murmur heard.  Pulmonary:      Effort: Pulmonary effort is normal.      Breath sounds: Normal breath sounds. No wheezing.   Musculoskeletal:         General: Normal range of motion.   Skin:     General: Skin is warm and dry.      Findings: No rash.   Neurological:      Mental Status: She is alert and oriented to person, place, and time.          Physical Exam        No visits with results within 1 Day(s) from this visit.   Latest known visit with results is:   Office Visit on 02/07/2025   Component Date Value Ref Range Status    SARS Antigen 02/07/2025 Not Detected  Not Detected, Presumptive Negative Final    Influenza A Antigen EDMAR 02/07/2025 Not Detected  Not Detected Final    Influenza B Antigen EDMAR 02/07/2025 Not Detected  Not Detected Final    Internal Control 02/07/2025 Passed  Passed Final    Lot Number 02/07/2025 [id number]   Final    Expiration Date 02/07/2025 11/14/2025   Final     A1C Last 3 Results          5/22/2024    16:04   HGBA1C Last 3 Results   Hemoglobin A1C 5.6      Lab Results   Component Value Date    CHLPL 186 09/12/2023    TRIG 119 09/12/2023    HDL 58 09/12/2023     (H) 09/12/2023     Lab Results   Component Value Date    TSH 1.320 09/12/2023     Lab Results   Component Value Date    GLUCOSE 89 09/12/2023    BUN 16 01/10/2024    CREATININE 0.9 08/05/2024    EGFRIFNONA 80 12/18/2021    EGFRIFAFRI 92 12/18/2021    BCR 19 09/12/2023    K 4.2 01/10/2024    CO2 25 01/10/2024    CALCIUM 9.5 01/10/2024    ALBUMIN 4.3 09/12/2023    LABIL2 1.3 12/19/2018    AST 19 09/12/2023    ALT 16 09/12/2023     Lab Results   Component Value Date    WBC 7.2 09/12/2023    HGB 14.5 09/12/2023    HCT 44.5 09/12/2023    MCV 88 09/12/2023     09/12/2023             Results               Assessment and Plan    Diagnoses and all orders for this visit:    1. Obesity (BMI 35.0-39.9 without comorbidity) (Primary)  -     phentermine (ADIPEX-P) 37.5 MG  tablet; Take 1 tablet by mouth Every Morning Before Breakfast. 1 tablet before breakfast or lunch as needed for appetite suppression.  Dispense: 30 tablet; Refill: 2    2. Morbid (severe) obesity due to excess calories    3. B12 deficiency  -     vitamin B-12 (CYANOCOBALAMIN) 1000 MCG tablet; Take 1 tablet by mouth Daily.    4. Vitamin D deficiency  -     Calcium-Vitamin D-Vitamin K (Viactiv Calcium Plus D) 650-12.5-40 MG-MCG-MCG chewable tablet; Chew 1 Piece Daily.    5. Elevated hemoglobin A1c    6. Essential hypertension    7. Mixed hyperlipidemia    8. Irritable bowel syndrome with both constipation and diarrhea    9. Fatigue, unspecified type    10. History of tobacco use        Assessment & Plan  1. Weight management.  - Phentermine is not controlling her appetite as it has in the past.  - She has not tried Mack or orlistat due to concerns about diarrhea exacerbation from her irritable bowel syndrome.  - Advised to aim for 150 minutes of exercise per week and to incorporate more vegetables, fruits, and whole grains into her diet while reducing intake of cakes, pies, candies, sugary drinks, and starchy foods.  - Prescription for phentermine with a couple of refills will be sent to pharmacy. Advised to monitor blood pressure weekly to ensure it remains below 140/90. If her blood pressure exceeds this threshold, discontinuation of phentermine may be necessary.    2. Irritable bowel syndrome.  - Experiences occasional stomach cramps and diarrhea.  - Bentyl discussed as a potential treatment for gut cramping and diarrhea.  - Will try Bentyl again as needed, up to four times a day, and will request a new prescription if it proves effective.  - Advised that Bentyl should not cause constipation or diarrhea, but helps with gut cramping.    3. Hypertension.  - Blood pressure is borderline today, possibly due to stress.  - Currently taking losartan 25 mg daily.  - Advised to monitor blood pressure weekly to ensure it  remains below 140/90. If her blood pressure exceeds this threshold, discontinuation of phentermine may be necessary.  - Encouraged to check blood pressure at home and report if it remains elevated.    4. Asthma.  - Uses albuterol as needed and does not typically use it more than three times a week.  - If she finds herself using it more often than three times a week, she will notify the office so that additional measures can be taken to control her asthma or lung symptoms.  - Advised to monitor usage and report any increase in frequency.    5. Vitamin B12 deficiency.  - Has had mild B12 deficiency in the past.  - Will restart B12 supplementation with over-the-counter B12 up to 1000 mcg daily.  - Labs for B12 level, thyroid, CBC, vitamin D, and lipid panel have been ordered through [facility].  - If B12 levels are above normal, the dosage may be adjusted accordingly.    6. Health maintenance.  - Due for her Medicare wellness appointment.  - Currently taking gabapentin 800 mg twice a day, meloxicam 15 mg daily, Singulair in the morning, Zyrtec at night, Viactiv calcium with vitamin D supplement daily, phentermine daily, trazodone 1 pill at night, duloxetine 60 mg, albuterol as needed.  - Advised to use Biotene for dry mouth and to drink plenty of water daily.    Follow-up  - Follow up in 1 to 2 months for her Medicare wellness exam and weight check.      Medications Discontinued During This Encounter   Medication Reason    azithromycin (ZITHROMAX) 250 MG tablet *Therapy completed    orlistat (Mack) 60 MG capsule Patient Reported Not Taking    Calcium Carb-Cholecalciferol (Os-Chinmay Extra D3) 500-15 MG-MCG tablet Alternate therapy    [facility] Vitamin B-12 1000 MCG tablet Patient Reported Not Taking    phentermine (ADIPEX-P) 37.5 MG tablet Reorder         Follow Up     Return in about 4 weeks (around 6/9/2025).    Patient was given instructions and counseling regarding her condition or for health maintenance advice. Please see  specific information pulled into the AVS if appropriate.     Patient or patient representative verbalized consent for the use of Ambient Listening during the visit with  Rosa Elena Turcios MD for chart documentation. 5/12/2025  14:20 EDT

## 2025-05-12 ENCOUNTER — OFFICE VISIT (OUTPATIENT)
Dept: FAMILY MEDICINE CLINIC | Facility: CLINIC | Age: 55
End: 2025-05-12
Payer: MEDICARE

## 2025-05-12 VITALS
TEMPERATURE: 97.1 F | RESPIRATION RATE: 18 BRPM | OXYGEN SATURATION: 98 % | DIASTOLIC BLOOD PRESSURE: 92 MMHG | HEIGHT: 66 IN | HEART RATE: 96 BPM | WEIGHT: 215 LBS | BODY MASS INDEX: 34.55 KG/M2 | SYSTOLIC BLOOD PRESSURE: 130 MMHG

## 2025-05-12 DIAGNOSIS — E55.9 VITAMIN D DEFICIENCY: ICD-10-CM

## 2025-05-12 DIAGNOSIS — I10 ESSENTIAL HYPERTENSION: ICD-10-CM

## 2025-05-12 DIAGNOSIS — R53.83 FATIGUE, UNSPECIFIED TYPE: ICD-10-CM

## 2025-05-12 DIAGNOSIS — K58.2 IRRITABLE BOWEL SYNDROME WITH BOTH CONSTIPATION AND DIARRHEA: ICD-10-CM

## 2025-05-12 DIAGNOSIS — E53.8 B12 DEFICIENCY: ICD-10-CM

## 2025-05-12 DIAGNOSIS — E66.01 MORBID (SEVERE) OBESITY DUE TO EXCESS CALORIES: ICD-10-CM

## 2025-05-12 DIAGNOSIS — R73.09 ELEVATED HEMOGLOBIN A1C: ICD-10-CM

## 2025-05-12 DIAGNOSIS — Z87.891 HISTORY OF TOBACCO USE: ICD-10-CM

## 2025-05-12 DIAGNOSIS — E78.2 MIXED HYPERLIPIDEMIA: ICD-10-CM

## 2025-05-12 DIAGNOSIS — E66.9 OBESITY (BMI 35.0-39.9 WITHOUT COMORBIDITY): Primary | ICD-10-CM

## 2025-05-12 PROCEDURE — G2211 COMPLEX E/M VISIT ADD ON: HCPCS | Performed by: FAMILY MEDICINE

## 2025-05-12 PROCEDURE — 1160F RVW MEDS BY RX/DR IN RCRD: CPT | Performed by: FAMILY MEDICINE

## 2025-05-12 PROCEDURE — 3075F SYST BP GE 130 - 139MM HG: CPT | Performed by: FAMILY MEDICINE

## 2025-05-12 PROCEDURE — 99214 OFFICE O/P EST MOD 30 MIN: CPT | Performed by: FAMILY MEDICINE

## 2025-05-12 PROCEDURE — 3080F DIAST BP >= 90 MM HG: CPT | Performed by: FAMILY MEDICINE

## 2025-05-12 PROCEDURE — 1159F MED LIST DOCD IN RCRD: CPT | Performed by: FAMILY MEDICINE

## 2025-05-12 PROCEDURE — 1126F AMNT PAIN NOTED NONE PRSNT: CPT | Performed by: FAMILY MEDICINE

## 2025-05-12 RX ORDER — LANOLIN ALCOHOL/MO/W.PET/CERES
1000 CREAM (GRAM) TOPICAL DAILY
Start: 2025-05-12

## 2025-05-12 RX ORDER — CALCIUM CARB/VITAMIN D3/VIT K1 650MG-12.5
1 TABLET,CHEWABLE ORAL DAILY
Start: 2025-05-12

## 2025-05-12 RX ORDER — PHENTERMINE HYDROCHLORIDE 37.5 MG/1
37.5 TABLET ORAL
Qty: 30 TABLET | Refills: 2 | Status: SHIPPED | OUTPATIENT
Start: 2025-05-12

## 2025-05-25 DIAGNOSIS — M79.10 MYALGIA: ICD-10-CM

## 2025-05-25 DIAGNOSIS — M54.16 LUMBAR BACK PAIN WITH RADICULOPATHY AFFECTING RIGHT LOWER EXTREMITY: ICD-10-CM

## 2025-05-27 RX ORDER — MELOXICAM 15 MG/1
TABLET ORAL
Qty: 100 TABLET | Refills: 0 | Status: SHIPPED | OUTPATIENT
Start: 2025-05-27

## 2025-05-29 DIAGNOSIS — I10 ESSENTIAL HYPERTENSION: ICD-10-CM

## 2025-05-29 RX ORDER — LOSARTAN POTASSIUM 25 MG/1
25 TABLET ORAL DAILY
Qty: 100 TABLET | Refills: 0 | Status: SHIPPED | OUTPATIENT
Start: 2025-05-29

## 2025-06-12 DIAGNOSIS — K21.9 GASTROESOPHAGEAL REFLUX DISEASE WITHOUT ESOPHAGITIS: ICD-10-CM

## 2025-06-12 RX ORDER — OMEPRAZOLE 40 MG/1
40 CAPSULE, DELAYED RELEASE ORAL EVERY MORNING
Qty: 90 CAPSULE | Refills: 0 | Status: SHIPPED | OUTPATIENT
Start: 2025-06-12

## 2025-07-15 ENCOUNTER — OFFICE (OUTPATIENT)
Dept: URBAN - METROPOLITAN AREA CLINIC 64 | Facility: CLINIC | Age: 55
End: 2025-07-15
Payer: MEDICARE

## 2025-07-15 VITALS
DIASTOLIC BLOOD PRESSURE: 83 MMHG | SYSTOLIC BLOOD PRESSURE: 136 MMHG | HEIGHT: 65 IN | WEIGHT: 206 LBS | HEART RATE: 87 BPM

## 2025-07-15 DIAGNOSIS — R11.2 NAUSEA WITH VOMITING, UNSPECIFIED: ICD-10-CM

## 2025-07-15 DIAGNOSIS — K83.4 SPASM OF SPHINCTER OF ODDI: ICD-10-CM

## 2025-07-15 DIAGNOSIS — R10.13 EPIGASTRIC PAIN: ICD-10-CM

## 2025-07-15 PROCEDURE — 99213 OFFICE O/P EST LOW 20 MIN: CPT | Performed by: PHYSICIAN ASSISTANT

## 2025-07-21 NOTE — PROGRESS NOTES
Subjective   The ABCs of the Annual Wellness Visit  Medicare Wellness Visit      Amy L Sturgeon is a 55 y.o. patient who presents for a Medicare Wellness Visit.    The following portions of the patient's history were reviewed and   updated as appropriate: allergies, current medications, past family history, past medical history, past social history, past surgical history, and problem list.    Compared to one year ago, the patient's physical   health is the same.  Compared to one year ago, the patient's mental   health is the same.    Recent Hospitalizations:  She was not admitted to the hospital during the last year.     Current Medical Providers:  Patient Care Team:  Rosa Elena Turcios MD as PCP - General (Family Medicine)  Mariluz Avila as Technologist    Outpatient Medications Prior to Visit   Medication Sig Dispense Refill    albuterol sulfate  (90 Base) MCG/ACT inhaler INHALE 2 PUFFS BY MOUTH  EVERY 4 HOURS AS NEEDED FOR WHEEZING 40.2 g 3    Calcium-Vitamin D-Vitamin K (Viactiv Calcium Plus D) 650-12.5-40 MG-MCG-MCG chewable tablet Chew 1 Piece Daily.      cetirizine (zyrTEC) 10 MG tablet Take 1 tablet by mouth Daily. 100 tablet 3    DULoxetine (CYMBALTA) 60 MG capsule TAKE ONE CAPSULE BY MOUTH EVERY DAY 90 capsule 3    fluticasone (FLONASE) 50 MCG/ACT nasal spray 2 sprays into the nostril(s) as directed by provider Daily. 48 g 3    gabapentin (NEURONTIN) 800 MG tablet Take 1 tablet by mouth 2 (Two) Times a Day. 400 tablet 0    hydrOXYzine pamoate (VISTARIL) 25 MG capsule Take 1 capsule by mouth 3 (Three) Times a Day As Needed for Anxiety. 30 capsule 0    losartan (COZAAR) 25 MG tablet TAKE ONE TABLET BY MOUTH EVERY  tablet 0    meloxicam (MOBIC) 15 MG tablet TAKE ONE-HALF TO ONE TABLET BY MOUTH DAILY AS NEEDED FOR PAIN (Patient taking differently: Take 1 tablet by mouth Daily. TAKE ONE-HALF TO ONE TABLET BY MOUTH DAILY AS NEEDED FOR PAIN) 100 tablet 0    montelukast (SINGULAIR) 10 MG  tablet TAKE 1 TABLET BY MOUTH EVERY NIGHT AT BEDTIME. (Patient taking differently: Take 1 tablet by mouth Daily.) 100 tablet 3    omeprazole (priLOSEC) 40 MG capsule TAKE ONE CAPSULE BY MOUTH EVERY MORNING 90 capsule 0    traZODone (DESYREL) 50 MG tablet TAKE 1 TABLET BY MOUTH EVERY NIGHT AT BEDTIME. 100 tablet 3    phentermine (ADIPEX-P) 37.5 MG tablet Take 1 tablet by mouth Every Morning Before Breakfast. 1 tablet before breakfast or lunch as needed for appetite suppression. 30 tablet 2    dicyclomine (BENTYL) 10 MG capsule Take 1 capsule by mouth 4 (Four) Times a Day Before Meals & at Bedtime. (Patient not taking: Reported on 5/22/2024) 120 capsule 0    vitamin B-12 (CYANOCOBALAMIN) 1000 MCG tablet Take 1 tablet by mouth Daily. (Patient not taking: Reported on 7/22/2025)      famotidine (PEPCID) 40 MG tablet TAKE ONE TABLET BY MOUTH EVERY NIGHT (Patient not taking: Reported on 5/12/2025) 100 tablet 3     No facility-administered medications prior to visit.     No opioid medication identified on active medication list. I have reviewed chart for other potential  high risk medication/s and harmful drug interactions in the elderly.      Aspirin is not on active medication list.  Aspirin use is not indicated based on review of current medical condition/s. Risk of harm outweighs potential benefits.  .    Patient Active Problem List   Diagnosis    Right tibial fracture    Ulcerative colitis    Low back pain    Irritable bowel syndrome    Allergic rhinitis    Sphincter of Oddi spasm    Anxiety    Depression    Asthma    Systemic lupus erythematosus arthritis    Fibromyalgia    Fatigue    Vestibular schwannoma    Hyperlipidemia    Balance problems    Generalized anxiety disorder    Essential hypertension    Insomnia    Sensorineural hearing loss    GERD (gastroesophageal reflux disease)    Onychomycosis    Vertigo    Class 3 severe obesity in adult    B12 deficiency    MARY CARMEN treated with BiPAP    Hyperkalemia    Weight loss  "   Fibromyositis    History of anxiety state    Benign essential hypertension    Seronegative arthritis    Lupus    Sensorineural hearing loss (SNHL) of left ear with unrestricted hearing of right ear    Osteoporosis    History of tobacco use    Class 1 obesity due to excess calories with serious comorbidity and body mass index (BMI) of 34.0 to 34.9 in adult    Adjustment disorder with depressed mood    Abdominal pain    Elevated lipase    High aspartate transaminase measurement    Family history of malignant neoplasm of breast in first degree relative    Medicare annual wellness visit, subsequent    Elevated hemoglobin A1c    Vitamin D deficiency     Advance Care Planning Advance Directive is on file.  ACP discussion was held with the patient during this visit. Patient has an advance directive in EMR which is still valid.             Objective   Vitals:    25 1253   BP: 116/78   BP Location: Right arm   Patient Position: Sitting   Cuff Size: Large Adult   Pulse: 75   Resp: 18   Temp: 97.5 °F (36.4 °C)   TempSrc: Temporal   SpO2: 98%   Weight: 93.7 kg (206 lb 9.6 oz)   Height: 163.8 cm (64.5\")   PainSc: 0-No pain       Estimated body mass index is 34.92 kg/m² as calculated from the following:    Height as of this encounter: 163.8 cm (64.5\").    Weight as of this encounter: 93.7 kg (206 lb 9.6 oz).              ATTENTION  What is the year: incorrect  What is the month of the year: correct  What is the day of the week?: correct  What is the date?: correct  MEMORY  Repeat address three times, only score third attempt: [address], [place]: 7  HOW MANY ANIMALS DID THE PATIENT NAME  Verbal Fluency -- Animal Names (0-25): 14-16  CLOCK DRAWING  Clock Drawing: All Correct  MEMORY RECALL  Tell me what you remember about that name and address we were repeating at the beginnin  ACE TOTAL SCORE  Total ACE Score - <25/30 strongly suggests cognitive impairment; <21/30 almost certainly shows " dementia: 27    Does the patient have evidence of cognitive impairment? No                                                                                                Health  Risk Assessment    Smoking Status:  Social History     Tobacco Use   Smoking Status Former    Current packs/day: 0.00    Average packs/day: 0.5 packs/day for 8.0 years (4.0 ttl pk-yrs)    Types: Cigarettes    Start date: 1987    Quit date: 1995    Years since quittin.5   Smokeless Tobacco Never     Alcohol Consumption:  Social History     Substance and Sexual Activity   Alcohol Use Yes    Alcohol/week: 1.0 standard drink of alcohol    Types: 1 Glasses of wine per week    Comment: Social       Fall Risk Screen  STEADI Fall Risk Assessment was completed, and patient is at LOW risk for falls.Assessment completed on:2025    Depression Screening   Little interest or pleasure in doing things? Not at all   Feeling down, depressed, or hopeless? Not at all   PHQ-2 Total Score 0      Health Habits and Functional and Cognitive Screenin/21/2025    10:36 AM   Functional & Cognitive Status   Do you have difficulty preparing food and eating? No   Do you have difficulty bathing yourself, getting dressed or grooming yourself? No   Do you have difficulty using the toilet? No   Do you have difficulty moving around from place to place? No   Do you have trouble with steps or getting out of a bed or a chair? No   Current Diet Limited Junk Food   Dental Exam Up to date   Eye Exam Up to date   Exercise (times per week) 0 times per week   Current Exercises Include No Regular Exercise   Do you need help using the phone?  No   Are you deaf or do you have serious difficulty hearing?  Yes   Do you need help to go to places out of walking distance? No   Do you need help shopping? No   Do you need help preparing meals?  No   Do you need help with housework?  No   Do you need help with laundry? No   Do you need help taking your medications? No    Do you need help managing money? No   Do you ever drive or ride in a car without wearing a seat belt? No   Have you felt unusual fatigue (could be tiredness), stress, anger or loneliness in the last month? No   Who do you live with? Spouse   If you need help, do you have trouble finding someone available to you? No   Have you been bothered in the last four weeks by sexual problems? No   Do you have difficulty concentrating, remembering or making decisions? No           Age-appropriate Screening Schedule:  Refer to the list below for future screening recommendations based on patient's age, sex and/or medical conditions. Orders for these recommended tests are listed in the plan section. The patient has been provided with a written plan.    Health Maintenance List  Health Maintenance   Topic Date Due    LIPID PANEL  09/12/2024    MAMMOGRAM  06/26/2025    COVID-19 Vaccine (5 - 2024-25 season) 11/12/2025 (Originally 9/1/2024)    INFLUENZA VACCINE  10/01/2025    ANNUAL WELLNESS VISIT  07/22/2026    COLORECTAL CANCER SCREENING  09/28/2027    TDAP/TD VACCINES (2 - Td or Tdap) 09/05/2033    HEPATITIS C SCREENING  Completed    Pneumococcal Vaccine 50+  Completed    ZOSTER VACCINE  Completed                                                                                                                                                CMS Preventative Services Quick Reference  Risk Factors Identified During Encounter  See assessment and plan below  The patient is currently taking phentermine and has lost 9 pounds over the past 2 months risks/problems have been discussed with the patient.  Pertinent information has been shared with the patient in the After Visit Summary.  An After Visit Summary and PPPS were made available to the patient.    Follow Up:   Next Medicare Wellness visit to be scheduled in 1 year.         Additional E&M Note during same encounter follows:  Patient has additional, significant, and separately  identifiable condition(s)/problem(s) that require work above and beyond the Medicare Wellness Visit     Chief Complaint  Medicare Wellness-subsequent and Weight Management    Subjective    HPI  Marcela is also being seen today for additional medical problem/s.    Weight Management  Patient is currently taking Phentermine.  Patient reports they are taking medications as prescribed and they are not having side effects.  Current diet is Well Balanced  Patient has lost  9 lbs in 2 months  Patient is not exercising.        The patient is a 55-year-old female here for a Medicare wellness exam and weight management.    She has been actively managing her weight, having lost 9 pounds over the past 2 months with the help of phentermine. She is seeking a refill of this medication today. Her current regimen includes Zepbound, which she has been using for about a year, resulting in a total weight loss of approximately 50 pounds. She has achieved her initial weight loss goal and is now focusing on muscle gain. She is considering the use of GLP-1s to further aid in her weight loss journey. She has also made dietary changes, including increased intake of fruits and vegetables and reduced consumption of bread. She recalls weighing up to 270 pounds about 4 years ago.    She has a history of sphincter of Oddi dysfunction, which has been treated with stenting, balloon dilation, and stretching. However, these treatments have led to scar tissue formation. She experienced an attack 13 days ago, accompanied by fever and chills, which was not typical for her. She reports a foul odor during bowel movements since her last GI attack, which she attributes to improper mixing of digestive juices. She has been using baby shampoo for hygiene but has not found it effective. She did not receive antibiotics for her recent attack and has not started probiotics. She reports no vaginal discharge, itching, or burning. She takes omeprazole nightly and has been  "using MiraLAX daily but does not feel completely empty after bowel movements. She had her last bowel movement this morning.    She underwent a colonoscopy and EGD on 09/28/2022, performed by Dr. Murphy Ramirez. The colonoscopy revealed moderate diverticulosis of the descending colon and sigmoid colon, with otherwise normal findings to the cecum. Due to suboptimal preparation, a repeat colonoscopy was recommended in 5 years. The EGD showed erythema in the antrum compatible with gastritis and an esophageal hiatal hernia. A biopsy was taken, and she was scheduled for an ERCP with sphincterotomy. She has active orders from her GI doctor but has not yet completed labs ordered on 07/15/2025, which included a CBC, CMP, lipase, and CRP.    She has a habit of picking at her skin, which she finds difficult to stop. She applies Neosporin to the affected areas, but they become hard and crusty. When she rubs or applies lotion, the crust comes off, causing discomfort. She then applies peroxide and Neosporin again. She has had these issues for a long time and notes that her skin does not heal quickly. She has tried tea tree oil diluted with olive oil for her toenails but has not used Penlac or Jublia. Oral medications were previously prescribed but were not completely effective.    She has completed her lab work today. She has expressed interest in getting her mammogram done at [facility] at [facility].    Diet: Increased intake of fruits and vegetables, reduced consumption of bread    PAST SURGICAL HISTORY:  - Stenting, balloon dilation, and stretching for sphincter of Oddi dysfunction  - Gallbladder surgery (date not specified)          Objective   Vital Signs:  /78 (BP Location: Right arm, Patient Position: Sitting, Cuff Size: Large Adult)   Pulse 75   Temp 97.5 °F (36.4 °C) (Temporal)   Resp 18   Ht 163.8 cm (64.5\")   Wt 93.7 kg (206 lb 9.6 oz)   SpO2 98%   BMI 34.92 kg/m²   Physical Exam  Vitals and nursing note " reviewed.   Constitutional:       General: She is not in acute distress.     Appearance: She is well-developed. She is obese. She is not ill-appearing.   HENT:      Head: Normocephalic and atraumatic.   Cardiovascular:      Rate and Rhythm: Normal rate and regular rhythm.      Heart sounds: No murmur heard.  Pulmonary:      Effort: Pulmonary effort is normal.      Breath sounds: Normal breath sounds. No wheezing.   Musculoskeletal:         General: Normal range of motion.   Skin:     General: Skin is warm and dry.      Findings: No rash.      Comments: Excoriations/ulcers at right elbow and right ankle  Dark brown seborrheic keratoses on upper extremities bilaterally  Onychomycotic changes of nails of feet bilaterally   Neurological:      Mental Status: She is alert and oriented to person, place, and time.   Psychiatric:         Mood and Affect: Mood normal.         Behavior: Behavior normal.                     Assessment and Plan           1. Weight management.  - She has lost 9 pounds over the past 2 months, bringing her BMI from 35.2 down to 34.9.  - Currently taking phentermine and requests a refill.  - Potential benefits and risks of GLP-1 agonists were discussed, including the increased risk of pancreatitis due to her sphincter of Oddi dysfunction.  - Advised to continue with phentermine for now and consult with her GI doctor regarding the permission for use of GLP-1 agonists. Prescription for phentermine will be sent to pharmacy.  Will consider tirzepatide if cleared by GI    2. Sphincter of Oddi dysfunction.  Patient denies history pancreatitis  - Reported a recent attack with fever and chills, which resolved after several days.  - An abdominal ultrasound was ordered by her GI doctor but has not yet been completed.  - Advised to follow up with her GI doctor regarding the ultrasound and the potential use of GLP-1 agonists.  - Ultrasound scheduled for 07/31/2025.    3. Skin picking disorder.  - Advised to  stop using peroxide on older wounds and to use soap, water, and Neosporin instead.  - NAC (N-acetylcysteine) was recommended as an over-the-counter option to help with skin picking disorder.  - Potential side effects, including nausea and sulfur burps, were discussed.  - Provided information on NAC for further consideration.    4. Toenail fungus.  - Advised to try Jublia (efinaconazole) for her toenail fungus.  - Prescription for Jublia will be sent to pharmacy.  - Discussed previous treatments and their effectiveness.    5. Health maintenance.  - Last colonoscopy was on 09/28/2022, showing moderate diverticulosis and a suboptimal prep.  - Repeat colonoscopy recommended in 06/2028.  - Mammogram order placed for annual screening.    Follow-up: She will follow up in 6 months.    Orders Placed This Encounter   Procedures    Mammo Screening Digital Tomosynthesis Bilateral With CAD     Standing Status:   Future     Expected Date:   7/23/2025     Expiration Date:   7/22/2026     Scheduling Instructions:      [facility]     Reason for Exam::   screening     Release to patient:   Routine Release [[id number]]     New Medications Ordered This Visit   Medications    phentermine (ADIPEX-P) 37.5 MG tablet     Sig: Take 1 tablet by mouth Every Morning Before Breakfast. 1 tablet before breakfast or lunch as needed for appetite suppression.     Dispense:  30 tablet     Refill:  2    Efinaconazole (Jublia) 10 % solution     Sig: Apply 1 Application topically Daily.     Dispense:  8 mL     Refill:  5     Diagnoses and all orders for this visit:    1. Medicare annual wellness visit, subsequent (Primary)    2. Obesity (BMI 35.0-39.9 without comorbidity)  -     phentermine (ADIPEX-P) 37.5 MG tablet; Take 1 tablet by mouth Every Morning Before Breakfast. 1 tablet before breakfast or lunch as needed for appetite suppression.  Dispense: 30 tablet; Refill: 2    3. Obesity (BMI 30-39.9)    4. History of tobacco use    5. Encounter  for screening mammogram for breast cancer  -     Mammo Screening Digital Tomosynthesis Bilateral With CAD; Future    6. Fungal infection of nail  -     Efinaconazole (Jublia) 10 % solution; Apply 1 Application topically Daily.  Dispense: 8 mL; Refill: 5    7. Sphincter of Oddi spasm      Discussion with Patientregarding advanced directives and end of life care was voluntarily entered by patient.  Patient has not had significant change in their medical condition in the past year.     Living Will and POST form discussed at length and reviewed with patient.     I spent 16 minutes  with patient reviewing information and documenting  in the chart.      Patient states she does want CPR. Reviewed medical interventions with patient and the differences between each: Comfort, Limited and Full. Patient opted for Full. Discussed the use of antibiotics at the end of life. She chose to use antibiotics consistent with treatment goals. Discussed artificially administered nutrition, patient is aware that if she is alert and oriented they can change their mind at any time. However, they have elected to have long term artificial nutrition..   Additionally, she would want withdrawal of advanced care interventions including mechanical ventilation and artificial nutrition if there is little or no chance of recovery to good quality of life.    Patient has identified her spouse, Jose M, as her healthcare representative. Advised to discuss with healthcare representative if they can comply with wishes. Patient encouraged to have a meeting to discuss his decision regarding advanced care directives and goals of care with extended family and significant  friends  In regard to the POST form: Previously completed forms on January 29, 2024 were reviewed and no changes are requested.  Advised to give to family members, place in an easily accessible place and take with them if going to the hospital or Emergency room.          Follow Up   Return in  about 6 months (around 1/22/2026) for weight management.  Patient was given instructions and counseling regarding her condition or for health maintenance advice. Please see specific information pulled into the AVS if appropriate.  Patient or patient representative verbalized consent for the use of Ambient Listening during the visit with  Rosa Elena Turcios MD for chart documentation. 7/22/2025  17:42 EDT

## 2025-07-22 ENCOUNTER — OFFICE VISIT (OUTPATIENT)
Dept: FAMILY MEDICINE CLINIC | Facility: CLINIC | Age: 55
End: 2025-07-22
Payer: MEDICARE

## 2025-07-22 VITALS
DIASTOLIC BLOOD PRESSURE: 78 MMHG | BODY MASS INDEX: 34.42 KG/M2 | HEART RATE: 75 BPM | SYSTOLIC BLOOD PRESSURE: 116 MMHG | WEIGHT: 206.6 LBS | OXYGEN SATURATION: 98 % | TEMPERATURE: 97.5 F | HEIGHT: 65 IN | RESPIRATION RATE: 18 BRPM

## 2025-07-22 DIAGNOSIS — K83.4 SPHINCTER OF ODDI SPASM: ICD-10-CM

## 2025-07-22 DIAGNOSIS — B35.1 FUNGAL INFECTION OF NAIL: ICD-10-CM

## 2025-07-22 DIAGNOSIS — Z87.891 HISTORY OF TOBACCO USE: ICD-10-CM

## 2025-07-22 DIAGNOSIS — E66.9 OBESITY (BMI 30-39.9): ICD-10-CM

## 2025-07-22 DIAGNOSIS — Z12.31 ENCOUNTER FOR SCREENING MAMMOGRAM FOR BREAST CANCER: ICD-10-CM

## 2025-07-22 DIAGNOSIS — E66.9 OBESITY (BMI 35.0-39.9 WITHOUT COMORBIDITY): ICD-10-CM

## 2025-07-22 DIAGNOSIS — Z00.00 MEDICARE ANNUAL WELLNESS VISIT, SUBSEQUENT: Primary | ICD-10-CM

## 2025-07-22 PROCEDURE — 99214 OFFICE O/P EST MOD 30 MIN: CPT | Performed by: FAMILY MEDICINE

## 2025-07-22 PROCEDURE — G0439 PPPS, SUBSEQ VISIT: HCPCS | Performed by: FAMILY MEDICINE

## 2025-07-22 PROCEDURE — 1170F FXNL STATUS ASSESSED: CPT | Performed by: FAMILY MEDICINE

## 2025-07-22 PROCEDURE — 1159F MED LIST DOCD IN RCRD: CPT | Performed by: FAMILY MEDICINE

## 2025-07-22 PROCEDURE — 1126F AMNT PAIN NOTED NONE PRSNT: CPT | Performed by: FAMILY MEDICINE

## 2025-07-22 PROCEDURE — 1160F RVW MEDS BY RX/DR IN RCRD: CPT | Performed by: FAMILY MEDICINE

## 2025-07-22 PROCEDURE — 3078F DIAST BP <80 MM HG: CPT | Performed by: FAMILY MEDICINE

## 2025-07-22 PROCEDURE — G2211 COMPLEX E/M VISIT ADD ON: HCPCS | Performed by: FAMILY MEDICINE

## 2025-07-22 PROCEDURE — 99497 ADVNCD CARE PLAN 30 MIN: CPT | Performed by: FAMILY MEDICINE

## 2025-07-22 PROCEDURE — 3074F SYST BP LT 130 MM HG: CPT | Performed by: FAMILY MEDICINE

## 2025-07-22 RX ORDER — EFINACONAZOLE 100 MG/ML
1 SOLUTION TOPICAL DAILY
Qty: 8 ML | Refills: 5 | Status: SHIPPED | OUTPATIENT
Start: 2025-07-22

## 2025-07-22 RX ORDER — PHENTERMINE HYDROCHLORIDE 37.5 MG/1
37.5 TABLET ORAL
Qty: 30 TABLET | Refills: 2 | Status: SHIPPED | OUTPATIENT
Start: 2025-07-22

## 2025-08-05 DIAGNOSIS — M54.16 LUMBAR BACK PAIN WITH RADICULOPATHY AFFECTING RIGHT LOWER EXTREMITY: ICD-10-CM

## 2025-08-05 RX ORDER — GABAPENTIN 800 MG/1
800 TABLET ORAL 2 TIMES DAILY
Qty: 360 TABLET | Refills: 0 | Status: SHIPPED | OUTPATIENT
Start: 2025-08-05

## 2025-08-13 DIAGNOSIS — M79.10 MYALGIA: ICD-10-CM

## 2025-08-13 DIAGNOSIS — M54.16 LUMBAR BACK PAIN WITH RADICULOPATHY AFFECTING RIGHT LOWER EXTREMITY: ICD-10-CM

## 2025-08-13 RX ORDER — MELOXICAM 15 MG/1
TABLET ORAL
Qty: 100 TABLET | Refills: 0 | Status: SHIPPED | OUTPATIENT
Start: 2025-08-13

## 2025-08-17 DIAGNOSIS — I10 ESSENTIAL HYPERTENSION: ICD-10-CM

## 2025-08-18 RX ORDER — LOSARTAN POTASSIUM 25 MG/1
25 TABLET ORAL DAILY
Qty: 100 TABLET | Refills: 0 | Status: SHIPPED | OUTPATIENT
Start: 2025-08-18

## (undated) DEVICE — BALLOON DILATATION CATHETER: Brand: HURRICANE™ RX

## (undated) DEVICE — SPHINCTEROTOME: Brand: DREAMTOME™ RX 44

## (undated) DEVICE — INFLATION DEVICE: Brand: ENCORE 26

## (undated) DEVICE — BITEBLOCK ENDO W/STRAP 60F A/ LF DISP

## (undated) DEVICE — PK ENDO GI 50

## (undated) DEVICE — DEV POSITION LK AND BIOP CAP SYS

## (undated) DEVICE — RETRIEVAL BALLOON CATHETER: Brand: EXTRACTOR™ PRO RX